# Patient Record
Sex: FEMALE | Race: WHITE | NOT HISPANIC OR LATINO | Employment: FULL TIME | URBAN - METROPOLITAN AREA
[De-identification: names, ages, dates, MRNs, and addresses within clinical notes are randomized per-mention and may not be internally consistent; named-entity substitution may affect disease eponyms.]

---

## 2022-07-24 LAB
CREAT ?TM UR-SCNC: 132 UMOL/L
EXT PROTEIN URINE: 28
PROT/CREAT UR: 212 MG/G{CREAT}

## 2022-08-31 LAB — HBA1C MFR BLD HPLC: 5.8 %

## 2022-12-01 ENCOUNTER — OFFICE VISIT (OUTPATIENT)
Dept: FAMILY MEDICINE CLINIC | Facility: CLINIC | Age: 67
End: 2022-12-01

## 2022-12-01 VITALS
OXYGEN SATURATION: 97 % | BODY MASS INDEX: 25.61 KG/M2 | TEMPERATURE: 96.9 F | HEIGHT: 64 IN | HEART RATE: 75 BPM | WEIGHT: 150 LBS | SYSTOLIC BLOOD PRESSURE: 130 MMHG | DIASTOLIC BLOOD PRESSURE: 80 MMHG

## 2022-12-01 DIAGNOSIS — M16.12 ARTHRITIS OF LEFT HIP: ICD-10-CM

## 2022-12-01 DIAGNOSIS — Z86.007: ICD-10-CM

## 2022-12-01 DIAGNOSIS — M25.542 ARTHRALGIA OF BOTH HANDS: ICD-10-CM

## 2022-12-01 DIAGNOSIS — E11.22 TYPE 2 DIABETES MELLITUS WITH STAGE 3A CHRONIC KIDNEY DISEASE, WITHOUT LONG-TERM CURRENT USE OF INSULIN (HCC): ICD-10-CM

## 2022-12-01 DIAGNOSIS — Z90.5 HX OF UNILATERAL NEPHRECTOMY: ICD-10-CM

## 2022-12-01 DIAGNOSIS — Q61.02 MULTIPLE RENAL CYSTS: ICD-10-CM

## 2022-12-01 DIAGNOSIS — E78.5 HYPERLIPIDEMIA, UNSPECIFIED HYPERLIPIDEMIA TYPE: ICD-10-CM

## 2022-12-01 DIAGNOSIS — Z11.59 NEED FOR HEPATITIS C SCREENING TEST: ICD-10-CM

## 2022-12-01 DIAGNOSIS — Q75.2 HYPERTELORISM: ICD-10-CM

## 2022-12-01 DIAGNOSIS — M25.541 ARTHRALGIA OF BOTH HANDS: ICD-10-CM

## 2022-12-01 DIAGNOSIS — N18.31 STAGE 3A CHRONIC KIDNEY DISEASE (HCC): ICD-10-CM

## 2022-12-01 DIAGNOSIS — F32.A DEPRESSION, UNSPECIFIED DEPRESSION TYPE: ICD-10-CM

## 2022-12-01 DIAGNOSIS — N18.31 TYPE 2 DIABETES MELLITUS WITH STAGE 3A CHRONIC KIDNEY DISEASE, WITHOUT LONG-TERM CURRENT USE OF INSULIN (HCC): ICD-10-CM

## 2022-12-01 DIAGNOSIS — E79.0 HYPERURICEMIA: ICD-10-CM

## 2022-12-01 DIAGNOSIS — Z72.0 TOBACCO USE: ICD-10-CM

## 2022-12-01 DIAGNOSIS — E55.9 VITAMIN D DEFICIENCY: ICD-10-CM

## 2022-12-01 DIAGNOSIS — Z13.820 SCREENING FOR OSTEOPOROSIS: ICD-10-CM

## 2022-12-01 DIAGNOSIS — Z00.00 HEALTH MAINTENANCE EXAMINATION: ICD-10-CM

## 2022-12-01 DIAGNOSIS — Z12.31 BREAST CANCER SCREENING BY MAMMOGRAM: Primary | ICD-10-CM

## 2022-12-01 DIAGNOSIS — N70.11 HYDROSALPINX: ICD-10-CM

## 2022-12-01 PROBLEM — M19.90 ARTHRITIS: Status: ACTIVE | Noted: 2022-12-01

## 2022-12-01 RX ORDER — ATENOLOL 50 MG/1
100 TABLET ORAL DAILY
Qty: 30 TABLET | Refills: 5 | Status: SHIPPED | OUTPATIENT
Start: 2022-12-01

## 2022-12-01 RX ORDER — ASPIRIN 81 MG/1
81 TABLET, CHEWABLE ORAL DAILY
COMMUNITY

## 2022-12-01 RX ORDER — ALLOPURINOL 300 MG/1
300 TABLET ORAL DAILY
Qty: 30 TABLET | Refills: 5 | Status: SHIPPED | OUTPATIENT
Start: 2022-12-01

## 2022-12-01 RX ORDER — ATORVASTATIN CALCIUM 10 MG/1
20 TABLET, FILM COATED ORAL DAILY
Qty: 30 TABLET | Refills: 5 | Status: SHIPPED | OUTPATIENT
Start: 2022-12-01

## 2022-12-01 RX ORDER — LIRAGLUTIDE 6 MG/ML
0.6 INJECTION SUBCUTANEOUS DAILY
Qty: 6 ML | Refills: 5 | Status: SHIPPED | OUTPATIENT
Start: 2022-12-01

## 2022-12-01 RX ORDER — ATENOLOL 50 MG/1
100 TABLET ORAL DAILY
COMMUNITY
End: 2022-12-01 | Stop reason: SDUPTHER

## 2022-12-01 RX ORDER — VITAMIN B COMPLEX
TABLET ORAL
COMMUNITY

## 2022-12-01 RX ORDER — LISINOPRIL 2.5 MG/1
2.5 TABLET ORAL DAILY
COMMUNITY
End: 2022-12-01 | Stop reason: SDUPTHER

## 2022-12-01 RX ORDER — LISINOPRIL 2.5 MG/1
2.5 TABLET ORAL DAILY
Qty: 30 TABLET | Refills: 5 | Status: SHIPPED | OUTPATIENT
Start: 2022-12-01

## 2022-12-01 RX ORDER — LIRAGLUTIDE 6 MG/ML
0.6 INJECTION SUBCUTANEOUS
COMMUNITY
End: 2022-12-01 | Stop reason: SDUPTHER

## 2022-12-01 RX ORDER — DULOXETIN HYDROCHLORIDE 60 MG/1
60 CAPSULE, DELAYED RELEASE ORAL
COMMUNITY
End: 2022-12-01 | Stop reason: SDUPTHER

## 2022-12-01 RX ORDER — ALLOPURINOL 200 MG/1
150 TABLET ORAL DAILY
COMMUNITY
End: 2022-12-01

## 2022-12-01 RX ORDER — ATORVASTATIN CALCIUM 10 MG/1
20 TABLET, FILM COATED ORAL DAILY
COMMUNITY
End: 2022-12-01 | Stop reason: SDUPTHER

## 2022-12-01 RX ORDER — DULOXETIN HYDROCHLORIDE 60 MG/1
60 CAPSULE, DELAYED RELEASE ORAL
Qty: 30 CAPSULE | Refills: 5 | Status: SHIPPED | OUTPATIENT
Start: 2022-12-01

## 2022-12-01 RX ORDER — CHLORAL HYDRATE 500 MG
CAPSULE ORAL
COMMUNITY

## 2022-12-01 NOTE — PROGRESS NOTES
Name: Belen Leon      : 1955      MRN: [id number]  Encounter Provider: Gustavo Wadsworth MD  Encounter Date: 2022   Encounter department: [facility]   4 month follow uo with fbmalik franks  1  Breast cancer screening by mammogram  -     Mammo screening bilateral w 3d & cad; Future; Expected date: 2022    2  Screening for osteoporosis  -     DXA bone density spine hip and pelvis; Future; Expected date: 2022    3  Hx of unilateral nephrectomy  -     Ambulatory Referral to Nephrology; Future    4  Stage 3a chronic kidney disease ([facility] [address] )  -     Ambulatory Referral to Nephrology; Future    5  Multiple renal cysts  -     Ambulatory Referral to Nephrology; Future    6  Hypertelorism  -     atenolol (TENORMIN) 50 mg tablet; Take 2 tablets (100 mg total) by mouth in the morning  -     lisinopril (ZESTRIL) 2 5 mg tablet; Take 1 tablet (2 5 mg total) by mouth in the morning    7  Hyperlipidemia, unspecified hyperlipidemia type  -     atorvastatin (LIPITOR) 10 mg tablet; Take 2 tablets (20 mg total) by mouth in the morning  -     Comprehensive metabolic panel; Future  -     CBC and differential; Future  -     Lipid panel; Future    8  Hyperuricemia  -     allopurinol (ZYLOPRIM) 300 mg tablet; Take 1 tablet (300 mg total) by mouth daily  -     Uric acid; Future  -     Ambulatory Referral to Nephrology; Future    9  Type 2 diabetes mellitus with stage 3a chronic kidney disease, without long-term current use of insulin (HCC)  -     liraglutide (Victoza) injection; Inject 0 1 mL (0 6 mg total) under the skin daily  -     Hemoglobin A1C; Future  -     Microalbumin / creatinine urine ratio  -     UA (URINE) with reflex to Scope    10  Depression, unspecified depression type  -     DULoxetine (CYMBALTA) 60 mg delayed release capsule; Take 1 capsule (60 mg total) by mouth daily in the early morning    11  Tobacco use    12   Arthritis of left hip    13  Arthralgia of both hands    14  Vitamin D deficiency  -     Vitamin D 25 hydroxy; Future  -     Ambulatory Referral to Nephrology; Future    15  Need for hepatitis C screening test  -     Hepatitis C antibody; Future    16  Hydrosalpinx  -     Ambulatory Referral to Gynecology; Future    17  H/O carcinoma in situ of skin  -     Ambulatory Referral to Dermatology; Future    18  Health maintenance examination      BMI Counseling: Body mass index is 25 75 kg/m²  Follow-up plan was not completed due to elderly patient (71 years old) where weight reduction/weight gain would complicate underlying health condition such as: illness or physical disability  Depression Screening and Follow-up Plan: Patient was screened for depression during today's encounter  They screened negative with a PHQ-2 score of 0  Subjective      This is a new patient to our practice  She recently moved back to this area from [place]  She has a history of nephrectomy for renal cell cancer this was several years ago and she has had no recurrence she does have stage 3 chronic kidney disease in the remaining kidney which also has multiple cysts  She follows with Nephrology for this  She also follows with gynecology for recurrent hydrosalpinx after having salpingectomy several years ago  She has history of skin cancer and follows with Dermatology once per year  She is diabetic, hypertensive and has high cholesterol  All these have been under good control  She has arthritis mostly of her wrist and thumbs but also end-stage arthritis of her left hip  Review of Systems   Constitutional: Negative  HENT: Negative  Respiratory: Negative  Cardiovascular: Negative  Gastrointestinal: Negative  Endocrine: Negative  Genitourinary: Negative  Musculoskeletal: Positive for arthralgias and gait problem  Skin: Negative  Hematological: Negative  Psychiatric/Behavioral: Negative          Current Outpatient Medications on File Prior to Visit   Medication Sig   • aspirin 81 mg chewable tablet Chew 81 mg in the morning   • cholecalciferol (VITAMIN D3) 25 mcg (1,000 units) tablet    • Omega-3 Fatty Acids (fish oil) 1,000 mg    • [DISCONTINUED] Allopurinol 200 MG TABS Take 150 mg by mouth in the morning   • [DISCONTINUED] atenolol (TENORMIN) 50 mg tablet Take 100 mg by mouth in the morning   • [DISCONTINUED] atorvastatin (LIPITOR) 10 mg tablet Take 20 mg by mouth in the morning   • [DISCONTINUED] DULoxetine (CYMBALTA) 60 mg delayed release capsule Take 60 mg by mouth daily in the early morning   • [DISCONTINUED] liraglutide (Victoza) injection 0 6 mg   • [DISCONTINUED] lisinopril (ZESTRIL) 2 5 mg tablet Take 2 5 mg by mouth in the morning       Objective     /80 (BP Location: Left arm, Patient Position: Sitting, Cuff Size: Adult)   Pulse 75   Temp (!) 96 9 °F (36 1 °C)   Ht 5' 4" (1 626 m)   Wt 68 kg (150 lb)   SpO2 97%   BMI 25 75 kg/m²     Physical Exam  Constitutional:       Appearance: Normal appearance  She is well-developed  HENT:      Head: Normocephalic and atraumatic  Right Ear: Tympanic membrane, ear canal and external ear normal       Left Ear: Tympanic membrane, ear canal and external ear normal       Nose: Nose normal       Mouth/Throat:      Mouth: Oropharynx is clear and moist  Mucous membranes are moist    Eyes:      Extraocular Movements: EOM normal       Pupils: Pupils are equal, round, and reactive to light  Neck:      Thyroid: No thyromegaly  Cardiovascular:      Rate and Rhythm: Normal rate and regular rhythm  Pulses: no weak pulses          Dorsalis pedis pulses are 1+ on the right side and 1+ on the left side  Posterior tibial pulses are 1+ on the left side  Heart sounds: Normal heart sounds  Pulmonary:      Effort: Pulmonary effort is normal       Breath sounds: Normal breath sounds     Abdominal:      General: Bowel sounds are normal       Palpations: Abdomen is soft  There is mass  Tenderness: There is no abdominal tenderness  Musculoskeletal:         General: Normal range of motion  Cervical back: Neck supple  Feet:      Right foot:      Skin integrity: No ulcer, skin breakdown, erythema, warmth, callus or dry skin  Left foot:      Skin integrity: No ulcer, skin breakdown, erythema, warmth, callus or dry skin  Lymphadenopathy:      Cervical: No cervical adenopathy  Skin:     General: Skin is warm and dry  Neurological:      Mental Status: She is alert  Psychiatric:         Mood and Affect: Mood and affect and mood normal          Behavior: Behavior normal      Diabetic Foot Exam    Patient's shoes and socks removed  Right Foot/Ankle   Right Foot Inspection  Skin Exam: skin normal and skin intact  No dry skin, no warmth, no callus, no erythema, no maceration, no abnormal color, no pre-ulcer, no ulcer and no callus  Toe Exam: ROM and strength within normal limits  Sensory   Vibration: intact  Proprioception: intact      Vascular  The right DP pulse is 1+  Left Foot/Ankle  Left Foot Inspection  Skin Exam: skin normal and skin intact  No dry skin, no warmth, no erythema, no maceration, normal color, no pre-ulcer, no ulcer and no callus  Toe Exam: ROM and strength within normal limits  Sensory   Vibration: intact  Proprioception: intact  Monofilament testing: intact    Vascular  The left DP pulse is 1+  The left PT pulse is 1+       Assign Risk Category  No deformity present  No loss of protective sensation  No weak pulses  Risk: 0    Luke Suero MD

## 2022-12-08 ENCOUNTER — TELEPHONE (OUTPATIENT)
Dept: FAMILY MEDICINE CLINIC | Facility: CLINIC | Age: 67
End: 2022-12-08

## 2022-12-08 DIAGNOSIS — M25.532 PAIN IN BOTH WRISTS: Primary | ICD-10-CM

## 2022-12-08 DIAGNOSIS — M25.531 PAIN IN BOTH WRISTS: Primary | ICD-10-CM

## 2022-12-09 ENCOUNTER — APPOINTMENT (OUTPATIENT)
Dept: LAB | Facility: CLINIC | Age: 67
End: 2022-12-09

## 2022-12-09 DIAGNOSIS — E55.9 VITAMIN D DEFICIENCY: ICD-10-CM

## 2022-12-09 DIAGNOSIS — N18.31 TYPE 2 DIABETES MELLITUS WITH STAGE 3A CHRONIC KIDNEY DISEASE, WITHOUT LONG-TERM CURRENT USE OF INSULIN (HCC): ICD-10-CM

## 2022-12-09 DIAGNOSIS — E78.5 HYPERLIPIDEMIA, UNSPECIFIED HYPERLIPIDEMIA TYPE: ICD-10-CM

## 2022-12-09 DIAGNOSIS — Z11.59 NEED FOR HEPATITIS C SCREENING TEST: ICD-10-CM

## 2022-12-09 DIAGNOSIS — E79.0 HYPERURICEMIA: ICD-10-CM

## 2022-12-09 DIAGNOSIS — E11.22 TYPE 2 DIABETES MELLITUS WITH STAGE 3A CHRONIC KIDNEY DISEASE, WITHOUT LONG-TERM CURRENT USE OF INSULIN (HCC): ICD-10-CM

## 2022-12-09 LAB
25(OH)D3 SERPL-MCNC: 28.8 NG/ML (ref 30–100)
ALBUMIN SERPL BCP-MCNC: 3.3 G/DL (ref 3.5–5)
ALP SERPL-CCNC: 88 U/L (ref 46–116)
ALT SERPL W P-5'-P-CCNC: 17 U/L (ref 12–78)
ANION GAP SERPL CALCULATED.3IONS-SCNC: 2 MMOL/L (ref 4–13)
AST SERPL W P-5'-P-CCNC: 12 U/L (ref 5–45)
BASOPHILS # BLD AUTO: 0.05 THOUSANDS/ÂΜL (ref 0–0.1)
BASOPHILS NFR BLD AUTO: 1 % (ref 0–1)
BILIRUB SERPL-MCNC: 0.47 MG/DL (ref 0.2–1)
BILIRUB UR QL STRIP: NEGATIVE
BUN SERPL-MCNC: 22 MG/DL (ref 5–25)
CALCIUM ALBUM COR SERPL-MCNC: 10.9 MG/DL (ref 8.3–10.1)
CALCIUM SERPL-MCNC: 10.3 MG/DL (ref 8.3–10.1)
CHLORIDE SERPL-SCNC: 105 MMOL/L (ref 96–108)
CHOLEST SERPL-MCNC: 172 MG/DL
CLARITY UR: CLEAR
CO2 SERPL-SCNC: 29 MMOL/L (ref 21–32)
COLOR UR: NORMAL
CREAT SERPL-MCNC: 1.07 MG/DL (ref 0.6–1.3)
EOSINOPHIL # BLD AUTO: 0.27 THOUSAND/ÂΜL (ref 0–0.61)
EOSINOPHIL NFR BLD AUTO: 3 % (ref 0–6)
ERYTHROCYTE [DISTWIDTH] IN BLOOD BY AUTOMATED COUNT: 15.7 % (ref 11.6–15.1)
GFR SERPL CREATININE-BSD FRML MDRD: 53 ML/MIN/1.73SQ M
GLUCOSE P FAST SERPL-MCNC: 99 MG/DL (ref 65–99)
GLUCOSE UR STRIP-MCNC: NEGATIVE MG/DL
HCT VFR BLD AUTO: 40.7 % (ref 34.8–46.1)
HDLC SERPL-MCNC: 55 MG/DL
HGB BLD-MCNC: 12.5 G/DL (ref 11.5–15.4)
HGB UR QL STRIP.AUTO: NEGATIVE
IMM GRANULOCYTES # BLD AUTO: 0.03 THOUSAND/UL (ref 0–0.2)
IMM GRANULOCYTES NFR BLD AUTO: 0 % (ref 0–2)
KETONES UR STRIP-MCNC: NEGATIVE MG/DL
LDLC SERPL CALC-MCNC: 98 MG/DL (ref 0–100)
LEUKOCYTE ESTERASE UR QL STRIP: NEGATIVE
LYMPHOCYTES # BLD AUTO: 2.23 THOUSANDS/ÂΜL (ref 0.6–4.47)
LYMPHOCYTES NFR BLD AUTO: 25 % (ref 14–44)
MCH RBC QN AUTO: 28.9 PG (ref 26.8–34.3)
MCHC RBC AUTO-ENTMCNC: 30.7 G/DL (ref 31.4–37.4)
MCV RBC AUTO: 94 FL (ref 82–98)
MONOCYTES # BLD AUTO: 0.69 THOUSAND/ÂΜL (ref 0.17–1.22)
MONOCYTES NFR BLD AUTO: 8 % (ref 4–12)
NEUTROPHILS # BLD AUTO: 5.75 THOUSANDS/ÂΜL (ref 1.85–7.62)
NEUTS SEG NFR BLD AUTO: 63 % (ref 43–75)
NITRITE UR QL STRIP: NEGATIVE
NONHDLC SERPL-MCNC: 117 MG/DL
NRBC BLD AUTO-RTO: 0 /100 WBCS
PH UR STRIP.AUTO: 6.5 [PH]
PLATELET # BLD AUTO: 351 THOUSANDS/UL (ref 149–390)
PMV BLD AUTO: 12 FL (ref 8.9–12.7)
POTASSIUM SERPL-SCNC: 5.1 MMOL/L (ref 3.5–5.3)
PROT SERPL-MCNC: 6.8 G/DL (ref 6.4–8.4)
PROT UR STRIP-MCNC: NEGATIVE MG/DL
RBC # BLD AUTO: 4.32 MILLION/UL (ref 3.81–5.12)
SODIUM SERPL-SCNC: 136 MMOL/L (ref 135–147)
SP GR UR STRIP.AUTO: 1.02 (ref 1–1.03)
TRIGL SERPL-MCNC: 95 MG/DL
URATE SERPL-MCNC: 3.8 MG/DL (ref 2–7.5)
UROBILINOGEN UR STRIP-ACNC: <2 MG/DL
WBC # BLD AUTO: 9.02 THOUSAND/UL (ref 4.31–10.16)

## 2022-12-10 LAB
CREAT UR-MCNC: 116 MG/DL
EST. AVERAGE GLUCOSE BLD GHB EST-MCNC: 108 MG/DL
HBA1C MFR BLD: 5.4 %
HCV AB SER QL: NORMAL
MICROALBUMIN UR-MCNC: 16.7 MG/L (ref 0–20)
MICROALBUMIN/CREAT 24H UR: 14 MG/G CREATININE (ref 0–30)

## 2022-12-12 DIAGNOSIS — N18.31 TYPE 2 DIABETES MELLITUS WITH STAGE 3A CHRONIC KIDNEY DISEASE, WITHOUT LONG-TERM CURRENT USE OF INSULIN (HCC): Primary | ICD-10-CM

## 2022-12-12 DIAGNOSIS — E11.22 TYPE 2 DIABETES MELLITUS WITH STAGE 3A CHRONIC KIDNEY DISEASE, WITHOUT LONG-TERM CURRENT USE OF INSULIN (HCC): Primary | ICD-10-CM

## 2022-12-12 DIAGNOSIS — E55.9 VITAMIN D DEFICIENCY: ICD-10-CM

## 2022-12-12 DIAGNOSIS — E78.5 HYPERLIPIDEMIA, UNSPECIFIED HYPERLIPIDEMIA TYPE: ICD-10-CM

## 2022-12-13 ENCOUNTER — TELEPHONE (OUTPATIENT)
Dept: FAMILY MEDICINE CLINIC | Facility: CLINIC | Age: 67
End: 2022-12-13

## 2023-01-10 LAB
LEFT EYE DIABETIC RETINOPATHY: NORMAL
RIGHT EYE DIABETIC RETINOPATHY: NORMAL

## 2023-01-16 ENCOUNTER — TELEPHONE (OUTPATIENT)
Dept: NEPHROLOGY | Facility: CLINIC | Age: 68
End: 2023-01-16

## 2023-01-17 ENCOUNTER — CONSULT (OUTPATIENT)
Dept: NEPHROLOGY | Facility: CLINIC | Age: 68
End: 2023-01-17

## 2023-01-17 VITALS
TEMPERATURE: 96.6 F | OXYGEN SATURATION: 95 % | SYSTOLIC BLOOD PRESSURE: 140 MMHG | BODY MASS INDEX: 26.12 KG/M2 | WEIGHT: 153 LBS | HEART RATE: 78 BPM | RESPIRATION RATE: 16 BRPM | DIASTOLIC BLOOD PRESSURE: 84 MMHG | HEIGHT: 64 IN

## 2023-01-17 DIAGNOSIS — E79.0 HYPERURICEMIA: ICD-10-CM

## 2023-01-17 DIAGNOSIS — Q61.02 MULTIPLE RENAL CYSTS: ICD-10-CM

## 2023-01-17 DIAGNOSIS — E55.9 VITAMIN D DEFICIENCY: ICD-10-CM

## 2023-01-17 DIAGNOSIS — N18.31 STAGE 3A CHRONIC KIDNEY DISEASE (HCC): ICD-10-CM

## 2023-01-17 DIAGNOSIS — Z90.5 HX OF UNILATERAL NEPHRECTOMY: ICD-10-CM

## 2023-01-17 NOTE — LETTER
January 17, 2023     Rolf Mcclendon 1961    Patient: Nieves Cai   YOB: 1955   Date of Visit: 1/17/2023       Dear Dr Patel Due: Thank you for referring Loreatha Schwab to me for evaluation  Below are my notes for this consultation  If you have questions, please do not hesitate to call me  I look forward to following your patient along with you  Sincerely,        Deja Fink MD        CC: No Recipients  Deja Fink MD  1/17/2023  9:50 AM  Incomplete  NEPHROLOGY OFFICE CONSULT  Nieves Cai 75 y o  female MRN: [id number]    Encounter: [id number] DATE: 1/17/2023    REASON FOR VISIT: Nieves Cai is a 75 y  o female who was referred by Daina Persaud MD for evaluation  Poonam Daniel HPI:    This is a 75years old female with past medical history of hypertension, diabetes mellitus type 2, chronic kidney disease stage IIIa, renal cell carcinoma status post right nephrectomy, dyslipidemia, insomnia who was referred to renal clinic by her PCP due to having history of nephrectomy  Patient moved to [facility] from [address] [place] [address]  In the year 2016 she was diagnosed with right renal cell carcinoma and underwent a laparoscopic nephrectomy  Admits to having adequate control over her hypertension and diabetes mellitus  She checks her blood pressure and blood sugar frequently admits to adequate control  She denies use of NSAIDs  She does admit to having several simple exophytic cyst on her left kidney which have been followed over the past few years  In the year 2021, renal ultrasound had revealed a new indeterminate cyst on her left kidney which was later followed with the use of MRI and noted to be benign  Currently she is only complaining of waking up in the middle of night and unable to fall asleep  Denies any blood in the urine          PAST MEDICAL HISTORY:  Past Medical History:   Diagnosis Date   • Chronic kidney disease    • Diabetes type 2, controlled ([facility] [address] )    • Fallopian tube disorder     fills with fluid   • Hypertension    • Kidney cysts    • Multiple thyroid nodules    • Stage 3 chronic kidney disease ([facility] [address] )        PAST SURGICAL HISTORY:  Past Surgical History:   Procedure Laterality Date   • KIDNEY SURGERY Right     removal   • TUBAL LIGATION Right    • WRIST SURGERY Left     basal joint soft tissue interpositional arthroplast L thumb       SOCIAL HISTORY:  Social History     Substance and Sexual Activity   Alcohol Use Never     Social History     Substance and Sexual Activity   Drug Use Yes   • Types: Marijuana     Social History     Tobacco Use   Smoking Status Some Days   • Years: 15 00   • Types: Cigarettes   Smokeless Tobacco Never       FAMILY HISTORY:  Family History   Problem Relation Age of Onset   • Heart disease Mother    • Hypertension Mother    • Hypertension Father    • Lung cancer Brother    • Hypertension Brother    • Diabetes Maternal Grandfather        ALLERGY:  No Known Allergies    MEDICATIONS:    Current Outpatient Medications:   •  allopurinol (ZYLOPRIM) 300 mg tablet, Take 1 tablet (300 mg total) by mouth daily (Patient taking differently: Take 150 mg by mouth daily), Disp: 30 tablet, Rfl: 5  •  aspirin 81 mg chewable tablet, Chew 81 mg in the morning, Disp: , Rfl:   •  atenolol (TENORMIN) 50 mg tablet, Take 2 tablets (100 mg total) by mouth in the morning (Patient taking differently: Take 50 mg by mouth in the morning), Disp: 30 tablet, Rfl: 5  •  atorvastatin (LIPITOR) 10 mg tablet, Take 2 tablets (20 mg total) by mouth in the morning (Patient taking differently: Take 10 mg by mouth in the morning), Disp: 30 tablet, Rfl: 5  •  Cetirizine-Pseudoephedrine (ZYRTEC-D ALLERGY & CONGESTION PO), as needed, Disp: , Rfl:   •  cholecalciferol (VITAMIN D3) 25 mcg (1,000 units) tablet, , Disp: , Rfl:   •  DULoxetine (CYMBALTA) 60 mg delayed release capsule, Take 1 capsule (60 mg total) by mouth daily in the early morning, Disp: 30 capsule, Rfl: 5  •  liraglutide (Victoza) injection, Inject 0 1 mL (0 6 mg total) under the skin daily, Disp: 6 mL, Rfl: 5  •  lisinopril (ZESTRIL) 2 5 mg tablet, Take 1 tablet (2 5 mg total) by mouth in the morning, Disp: 30 tablet, Rfl: 5  •  Omega-3 Fatty Acids (fish oil) 1,000 mg, , Disp: , Rfl:     REVIEW OF SYSTEMS:    Review of Systems   Constitutional: Negative  HENT: Negative  Eyes: Negative  Respiratory: Negative  Cardiovascular: Negative  Gastrointestinal: Negative  Endocrine: Negative  Genitourinary: Negative  Musculoskeletal: Negative  Skin: Negative  Allergic/Immunologic: Negative  Neurological: Negative  Hematological: Negative  All other systems reviewed and are negative  PHYSICAL EXAM:  Vitals:    01/17/23 0858   BP: 140/84   BP Location: Left arm   Patient Position: Sitting   Cuff Size: Standard   Pulse: 78   Resp: 16   Temp: (!) 96 6 °F (35 9 °C)   TempSrc: Temporal   SpO2: 95%   Weight: 69 4 kg (153 lb)   Height: 5' 3 5" (1 613 m)     Body mass index is 26 68 kg/m²  Physical Exam  Constitutional:       Appearance: She is well-developed  HENT:      Head: Normocephalic and atraumatic  Eyes:      Pupils: Pupils are equal, round, and reactive to light  Cardiovascular:      Rate and Rhythm: Normal rate and regular rhythm  Heart sounds: Normal heart sounds  Pulmonary:      Effort: Pulmonary effort is normal    Abdominal:      General: Bowel sounds are normal       Palpations: Abdomen is soft  Musculoskeletal:         General: Normal range of motion  Cervical back: Neck supple  Skin:     General: Skin is warm  Neurological:      Mental Status: She is alert and oriented to person, place, and time  LAB RESULTS:  Results for orders placed or performed in visit on 12/09/22   Hemoglobin A1C   Result Value Ref Range    Hemoglobin A1C 5 4 Normal 3 8-5 6%; PreDiabetic 5 7-6 4%;  Diabetic >=6 5%; Glycemic control for adults with diabetes <7 0% %     mg/dl   Vitamin D 25 hydroxy   Result Value Ref Range    Vit D, 25-Hydroxy 28 8 (L) 30 0 - 100 0 ng/mL   Uric acid   Result Value Ref Range    Uric Acid 3 8 2 0 - 7 5 mg/dL   Comprehensive metabolic panel   Result Value Ref Range    Sodium 136 135 - 147 mmol/L    Potassium 5 1 3 5 - 5 3 mmol/L    Chloride 105 96 - 108 mmol/L    CO2 29 21 - 32 mmol/L    ANION GAP 2 (L) 4 - 13 mmol/L    BUN 22 5 - 25 mg/dL    Creatinine 1 07 0 60 - 1 30 mg/dL    Glucose, Fasting 99 65 - 99 mg/dL    Calcium 10 3 (H) 8 3 - 10 1 mg/dL    Corrected Calcium 10 9 (H) 8 3 - 10 1 mg/dL    AST 12 5 - 45 U/L    ALT 17 12 - 78 U/L    Alkaline Phosphatase 88 46 - 116 U/L    Total Protein 6 8 6 4 - 8 4 g/dL    Albumin 3 3 (L) 3 5 - 5 0 g/dL    Total Bilirubin 0 47 0 20 - 1 00 mg/dL    eGFR 53 ml/min/1 73sq m   CBC and differential   Result Value Ref Range    WBC 9 02 4 31 - 10 16 Thousand/uL    RBC 4 32 3 81 - 5 12 Million/uL    Hemoglobin 12 5 11 5 - 15 4 g/dL    Hematocrit 40 7 34 8 - 46 1 %    MCV 94 82 - 98 fL    MCH 28 9 26 8 - 34 3 pg    MCHC 30 7 (L) 31 4 - 37 4 g/dL    RDW 15 7 (H) 11 6 - 15 1 %    MPV 12 0 8 9 - 12 7 fL    Platelets 890 610 - 117 Thousands/uL    nRBC 0 /100 WBCs    Neutrophils Relative 63 43 - 75 %    Immat GRANS % 0 0 - 2 %    Lymphocytes Relative 25 14 - 44 %    Monocytes Relative 8 4 - 12 %    Eosinophils Relative 3 0 - 6 %    Basophils Relative 1 0 - 1 %    Neutrophils Absolute 5 75 1 85 - 7 62 Thousands/µL    Immature Grans Absolute 0 03 0 00 - 0 20 Thousand/uL    Lymphocytes Absolute 2 23 0 60 - 4 47 Thousands/µL    Monocytes Absolute 0 69 0 17 - 1 22 Thousand/µL    Eosinophils Absolute 0 27 0 00 - 0 61 Thousand/µL    Basophils Absolute 0 05 0 00 - 0 10 Thousands/µL   Lipid panel   Result Value Ref Range    Cholesterol 172 See Comment mg/dL    Triglycerides 95 See Comment mg/dL    HDL, Direct 55 >=50 mg/dL    LDL Calculated 98 0 - 100 mg/dL    Non-HDL-Chol (CHOL-HDL) 117 mg/dl   Hepatitis C antibody   Result Value Ref Range    Hepatitis C Ab Non-reactive Non-reactive         ASSESSMENT and PLAN:  Jalen Montero was seen today for chronic kidney disease and consult  Diagnoses and all orders for this visit:    Hx of unilateral nephrectomy  -     Ambulatory Referral to Nephrology    Stage 3a chronic kidney disease [facility])  -     Ambulatory Referral to Nephrology  -     US kidney and bladder; Future  -     Albumin; Standing  -     Calcium; Standing  -     CBC and differential; Standing  -     Comprehensive metabolic panel; Standing  -     Phosphorus; Standing  -     Protein / creatinine ratio, urine; Standing  -     PTH, intact; Standing  -     Urinalysis with microscopic; Standing  -     Vitamin D 25 hydroxy; Standing  -     Immunoglobulin free LT chains blood; Future  -     Protein electrophoresis, serum; Future  -     Albumin  -     Calcium  -     CBC and differential  -     Comprehensive metabolic panel  -     Phosphorus  -     Protein / creatinine ratio, urine  -     PTH, intact  -     Urinalysis with microscopic  -     Vitamin D 25 hydroxy    Multiple renal cysts  -     Ambulatory Referral to Nephrology    Hyperuricemia  -     Ambulatory Referral to Nephrology    Vitamin D deficiency  -     Ambulatory Referral to Nephrology      75years old female with past medical history of hypertension, diabetes mellitus type 2, renal cell carcinoma status post right nephrectomy, chronic disease stage IIIa, dyslipidemia who presents to renal clinic for evaluation and management of CKD  1   Chronic kidney disease stage IIIa: Etiology likely solitary kidney after undergoing nephrectomy for renal cell carcinoma plus 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  -Patient have several risk factors however there are relatively well-controlled and remains at low risk of progression of CKD  -Avoidance of NSAIDs reiterated   -Avoidance of contrast based CT reiterated as well    -Ingestion of 64 ounces of water recommended  -Recent labs revealing serum creatinine 1 0 with estimated GFR of 53 and stable  2   Hypertension due to CKD: Office blood pressure is above target at 140/84  -Target blood pressure is 130/80   -Recommend low-salt diet   -She is maintained on atenolol 50 mg daily  3   Diabetes mellitus type 2: Blood sugars well controlled on Victoza  4   Multiple cyst on kidney: With the remaining left kidney, noted to have several simple cyst   -We will obtain a baseline renal ultrasound and there are 2023 to evaluate the size of the cyst     5   Dyslipidemia: On statin  6   Proteinuria: Recent urinalysis revealing absence of proteinuria  7   Hypercalcemia: Noted serum calcium of 10 3 and mildly elevated  She is on vitamin D3 supplement  -Recommended increasing water intake up to 64 ounces to prevent worsening of hypercalcemia   -Will order paraproteinemia work-up  8   Hyperkalemia: Serum potassium of 5 1 and at the upper end of normal   Recommended low potassium diet  9   Nutrition: Low-salt, moderate potassium and protein diet recommended  Mary Coates

## 2023-01-17 NOTE — PROGRESS NOTES
NEPHROLOGY OFFICE CONSULT  Larry Tyson 75 y o  female MRN: [id number]    Encounter: [id number] DATE: 1/17/2023    REASON FOR VISIT: Larry Tyson is a 75 y  o female who was referred by Gunnar Amos MD for evaluation  Phoebe Lagunas HPI:    This is a 75years old female with past medical history of hypertension, diabetes mellitus type 2, chronic kidney disease stage IIIa, renal cell carcinoma status post right nephrectomy, dyslipidemia, insomnia who was referred to renal clinic by her PCP due to having history of nephrectomy  Patient moved to [facility] from [address] [place] [address]  In the year 2016 she was diagnosed with right renal cell carcinoma and underwent a laparoscopic nephrectomy  Admits to having adequate control over her hypertension and diabetes mellitus  She checks her blood pressure and blood sugar frequently admits to adequate control  She denies use of NSAIDs  She does admit to having several simple exophytic cyst on her left kidney which have been followed over the past few years  In the year 2021, renal ultrasound had revealed a new indeterminate cyst on her left kidney which was later followed with the use of MRI and noted to be benign  Currently she is only complaining of waking up in the middle of night and unable to fall asleep  Denies any blood in the urine          PAST MEDICAL HISTORY:  Past Medical History:   Diagnosis Date   • Chronic kidney disease    • Diabetes type 2, controlled ([address] )    • Fallopian tube disorder     fills with fluid   • Hypertension    • Kidney cysts    • Multiple thyroid nodules    • Stage 3 chronic kidney disease ([address] )        PAST SURGICAL HISTORY:  Past Surgical History:   Procedure Laterality Date   • KIDNEY SURGERY Right     removal   • TUBAL LIGATION Right    • WRIST SURGERY Left     basal joint soft tissue interpositional arthroplast L thumb       SOCIAL HISTORY:  Social History     Substance and Sexual Activity   Alcohol Use Never     Social History Substance and Sexual Activity   Drug Use Yes   • Types: Marijuana     Social History     Tobacco Use   Smoking Status Some Days   • Years: 15 00   • Types: Cigarettes   Smokeless Tobacco Never       FAMILY HISTORY:  Family History   Problem Relation Age of Onset   • Heart disease Mother    • Hypertension Mother    • Hypertension Father    • Lung cancer Brother    • Hypertension Brother    • Diabetes Maternal Grandfather        ALLERGY:  No Known Allergies    MEDICATIONS:    Current Outpatient Medications:   •  allopurinol (ZYLOPRIM) 300 mg tablet, Take 1 tablet (300 mg total) by mouth daily (Patient taking differently: Take 150 mg by mouth daily), Disp: 30 tablet, Rfl: 5  •  aspirin 81 mg chewable tablet, Chew 81 mg in the morning, Disp: , Rfl:   •  atenolol (TENORMIN) 50 mg tablet, Take 2 tablets (100 mg total) by mouth in the morning (Patient taking differently: Take 50 mg by mouth in the morning), Disp: 30 tablet, Rfl: 5  •  atorvastatin (LIPITOR) 10 mg tablet, Take 2 tablets (20 mg total) by mouth in the morning (Patient taking differently: Take 10 mg by mouth in the morning), Disp: 30 tablet, Rfl: 5  •  Cetirizine-Pseudoephedrine (ZYRTEC-D ALLERGY & CONGESTION PO), as needed, Disp: , Rfl:   •  cholecalciferol (VITAMIN D3) 25 mcg (1,000 units) tablet, , Disp: , Rfl:   •  DULoxetine (CYMBALTA) 60 mg delayed release capsule, Take 1 capsule (60 mg total) by mouth daily in the early morning, Disp: 30 capsule, Rfl: 5  •  liraglutide (Victoza) injection, Inject 0 1 mL (0 6 mg total) under the skin daily, Disp: 6 mL, Rfl: 5  •  lisinopril (ZESTRIL) 2 5 mg tablet, Take 1 tablet (2 5 mg total) by mouth in the morning, Disp: 30 tablet, Rfl: 5  •  Omega-3 Fatty Acids (fish oil) 1,000 mg, , Disp: , Rfl:     REVIEW OF SYSTEMS:    Review of Systems   Constitutional: Negative  HENT: Negative  Eyes: Negative  Respiratory: Negative  Cardiovascular: Negative  Gastrointestinal: Negative  Endocrine: Negative  Genitourinary: Negative  Musculoskeletal: Negative  Skin: Negative  Allergic/Immunologic: Negative  Neurological: Negative  Hematological: Negative  All other systems reviewed and are negative  PHYSICAL EXAM:  Vitals:    01/17/23 0858   BP: 140/84   BP Location: Left arm   Patient Position: Sitting   Cuff Size: Standard   Pulse: 78   Resp: 16   Temp: (!) 96 6 °F (35 9 °C)   TempSrc: Temporal   SpO2: 95%   Weight: 69 4 kg (153 lb)   Height: 5' 3 5" (1 613 m)     Body mass index is 26 68 kg/m²  Physical Exam  Constitutional:       Appearance: She is well-developed  HENT:      Head: Normocephalic and atraumatic  Eyes:      Pupils: Pupils are equal, round, and reactive to light  Cardiovascular:      Rate and Rhythm: Normal rate and regular rhythm  Heart sounds: Normal heart sounds  Pulmonary:      Effort: Pulmonary effort is normal    Abdominal:      General: Bowel sounds are normal       Palpations: Abdomen is soft  Musculoskeletal:         General: Normal range of motion  Cervical back: Neck supple  Skin:     General: Skin is warm  Neurological:      Mental Status: She is alert and oriented to person, place, and time  LAB RESULTS:  Results for orders placed or performed in visit on 12/09/22   Hemoglobin A1C   Result Value Ref Range    Hemoglobin A1C 5 4 Normal 3 8-5 6%; PreDiabetic 5 7-6 4%;  Diabetic >=6 5%; Glycemic control for adults with diabetes <7 0% %     mg/dl   Vitamin D 25 hydroxy   Result Value Ref Range    Vit D, 25-Hydroxy 28 8 (L) 30 0 - 100 0 ng/mL   Uric acid   Result Value Ref Range    Uric Acid 3 8 2 0 - 7 5 mg/dL   Comprehensive metabolic panel   Result Value Ref Range    Sodium 136 135 - 147 mmol/L    Potassium 5 1 3 5 - 5 3 mmol/L    Chloride 105 96 - 108 mmol/L    CO2 29 21 - 32 mmol/L    ANION GAP 2 (L) 4 - 13 mmol/L    BUN 22 5 - 25 mg/dL    Creatinine 1 07 0 60 - 1 30 mg/dL    Glucose, Fasting 99 65 - 99 mg/dL    Calcium 10 3 (H) 8 3 - 10 1 mg/dL    Corrected Calcium 10 9 (H) 8 3 - 10 1 mg/dL    AST 12 5 - 45 U/L    ALT 17 12 - 78 U/L    Alkaline Phosphatase 88 46 - 116 U/L    Total Protein 6 8 6 4 - 8 4 g/dL    Albumin 3 3 (L) 3 5 - 5 0 g/dL    Total Bilirubin 0 47 0 20 - 1 00 mg/dL    eGFR 53 ml/min/1 73sq m   CBC and differential   Result Value Ref Range    WBC 9 02 4 31 - 10 16 Thousand/uL    RBC 4 32 3 81 - 5 12 Million/uL    Hemoglobin 12 5 11 5 - 15 4 g/dL    Hematocrit 40 7 34 8 - 46 1 %    MCV 94 82 - 98 fL    MCH 28 9 26 8 - 34 3 pg    MCHC 30 7 (L) 31 4 - 37 4 g/dL    RDW 15 7 (H) 11 6 - 15 1 %    MPV 12 0 8 9 - 12 7 fL    Platelets 303 834 - 066 Thousands/uL    nRBC 0 /100 WBCs    Neutrophils Relative 63 43 - 75 %    Immat GRANS % 0 0 - 2 %    Lymphocytes Relative 25 14 - 44 %    Monocytes Relative 8 4 - 12 %    Eosinophils Relative 3 0 - 6 %    Basophils Relative 1 0 - 1 %    Neutrophils Absolute 5 75 1 85 - 7 62 Thousands/µL    Immature Grans Absolute 0 03 0 00 - 0 20 Thousand/uL    Lymphocytes Absolute 2 23 0 60 - 4 47 Thousands/µL    Monocytes Absolute 0 69 0 17 - 1 22 Thousand/µL    Eosinophils Absolute 0 27 0 00 - 0 61 Thousand/µL    Basophils Absolute 0 05 0 00 - 0 10 Thousands/µL   Lipid panel   Result Value Ref Range    Cholesterol 172 See Comment mg/dL    Triglycerides 95 See Comment mg/dL    HDL, Direct 55 >=50 mg/dL    LDL Calculated 98 0 - 100 mg/dL    Non-HDL-Chol (CHOL-HDL) 117 mg/dl   Hepatitis C antibody   Result Value Ref Range    Hepatitis C Ab Non-reactive Non-reactive         ASSESSMENT and PLAN:  Sonal Clarke was seen today for chronic kidney disease and consult  Diagnoses and all orders for this visit:    Hx of unilateral nephrectomy  -     Ambulatory Referral to Nephrology    Stage 3a chronic kidney disease [facility])  -     Ambulatory Referral to Nephrology  -     US kidney and bladder;  Future  -     Albumin; Standing  -     Calcium; Standing  -     CBC and differential; Standing  -     Comprehensive metabolic panel; Standing  -     Phosphorus; Standing  -     Protein / creatinine ratio, urine; Standing  -     PTH, intact; Standing  -     Urinalysis with microscopic; Standing  -     Vitamin D 25 hydroxy; Standing  -     Immunoglobulin free LT chains blood; Future  -     Protein electrophoresis, serum; Future  -     Albumin  -     Calcium  -     CBC and differential  -     Comprehensive metabolic panel  -     Phosphorus  -     Protein / creatinine ratio, urine  -     PTH, intact  -     Urinalysis with microscopic  -     Vitamin D 25 hydroxy    Multiple renal cysts  -     Ambulatory Referral to Nephrology    Hyperuricemia  -     Ambulatory Referral to Nephrology    Vitamin D deficiency  -     Ambulatory Referral to Nephrology      75years old female with past medical history of hypertension, diabetes mellitus type 2, renal cell carcinoma status post right nephrectomy, chronic disease stage IIIa, dyslipidemia who presents to renal clinic for evaluation and management of CKD  1   Chronic kidney disease stage IIIa: Etiology likely solitary kidney after undergoing nephrectomy for renal cell carcinoma plus 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  -Patient have several risk factors however there are relatively well-controlled and remains at low risk of progression of CKD  -Avoidance of NSAIDs reiterated   -Avoidance of contrast based CT reiterated as well  -Ingestion of 64 ounces of water recommended  -Recent labs revealing serum creatinine 1 0 with estimated GFR of 53 and stable  2   Hypertension due to CKD: Office blood pressure is above target at 140/84  -Target blood pressure is 130/80   -Recommend low-salt diet   -She is maintained on atenolol 50 mg daily  3   Diabetes mellitus type 2: Blood sugars well controlled on Victoza      4   Multiple cyst on kidney: With the remaining left kidney, noted to have several simple cyst   -We will obtain a baseline renal ultrasound and there are 2023 to evaluate the size of the cyst     5   Dyslipidemia: On statin  6   Proteinuria: Recent urinalysis revealing absence of proteinuria  7   Hypercalcemia: Noted serum calcium of 10 3 and mildly elevated  She is on vitamin D3 supplement  -Recommended increasing water intake up to 64 ounces to prevent worsening of hypercalcemia   -Will order paraproteinemia work-up  8   Hyperkalemia: Serum potassium of 5 1 and at the upper end of normal   Recommended low potassium diet  9   Nutrition: Low-salt, moderate potassium and protein diet recommended  Ana Yanes

## 2023-01-18 ENCOUNTER — TELEPHONE (OUTPATIENT)
Dept: ADMINISTRATIVE | Facility: OTHER | Age: 68
End: 2023-01-18

## 2023-01-18 NOTE — TELEPHONE ENCOUNTER
Upon review of the In Basket request and the patient's chart, initial outreach has been made via fax to facility  Please see Contacts section for details       Thank you  Sandi Denny

## 2023-01-18 NOTE — LETTER
Diabetic Eye Exam Form    Date Requested: 23  Patient: Kimberly Mathew  Patient : [id number]   Referring Provider: Lizz Roberts MD      DIABETIC Eye Exam Date _______________________________      Type of Exam MUST be documented for Diabetic Eye Exams  Please CHECK ONE  Retinal Exam       Dilated Retinal Exam       OCT       Optomap-Iris Exam      Fundus Photography       Left Eye - Please check Retinopathy or No Retinopathy        Exam did show retinopathy    Exam did not show retinopathy       Right Eye - Please check Retinopathy or No Retinopathy       Exam did show retinopathy    Exam did not show retinopathy       Comments __________________________________________________________    Practice Providing Exam ______________________________________________    Exam Performed By (print name) _______________________________________      Provider Signature ___________________________________________________      These reports are needed for  compliance  Please fax this completed form and a copy of the Diabetic Eye Exam report to our office located at [place] [address] as soon as possible via [phone] [facility]: Phone [phone]  We thank you for your assistance in treating our mutual patient

## 2023-01-18 NOTE — TELEPHONE ENCOUNTER
----- Message from Brendon Puente sent at 1/17/2023  2:42 PM EST -----  Regarding: Care gap request  01/17/23 2:42 PM    Hello, our patient Doni Love has had Diabetic Eye Exam completed/performed  Please assist in updating the patient chart by making an External outreach to [facility] eye specialist facility located in [address] [place] [address] in [place], [address] [place] [address]  The date of service is 1-2 weeks ago      Thank you,  Brendon Foreman [phone]

## 2023-01-24 NOTE — TELEPHONE ENCOUNTER
As a follow-up, a second attempt has been made for outreach via fax to facility  Please see Contacts section for details      Thank you  Annia June

## 2023-01-27 NOTE — TELEPHONE ENCOUNTER
Upon review of the In Basket request we were able to locate, review, and update the patient chart as requested for Diabetic Eye Exam     Any additional questions or concerns should be emailed to the Practice Liaisons via the appropriate education email address, please do not reply via In Basket      Thank you  Laurel Grimm

## 2023-01-30 PROBLEM — Z11.59 NEED FOR HEPATITIS C SCREENING TEST: Status: RESOLVED | Noted: 2022-12-01 | Resolved: 2023-01-30

## 2023-02-14 ENCOUNTER — OFFICE VISIT (OUTPATIENT)
Dept: OBGYN CLINIC | Facility: CLINIC | Age: 68
End: 2023-02-14

## 2023-02-14 VITALS
DIASTOLIC BLOOD PRESSURE: 66 MMHG | WEIGHT: 150.8 LBS | BODY MASS INDEX: 25.74 KG/M2 | HEIGHT: 64 IN | SYSTOLIC BLOOD PRESSURE: 118 MMHG

## 2023-02-14 DIAGNOSIS — Z01.419 ENCOUNTER FOR GYNECOLOGICAL EXAMINATION: Primary | ICD-10-CM

## 2023-02-14 DIAGNOSIS — N70.11 HYDROSALPINX: ICD-10-CM

## 2023-02-14 NOTE — ASSESSMENT & PLAN NOTE
Pap/HPV not indicated  Mammogram ordered/current  Colonoscopy:    H/o hydrosalpinx - previous provider at one time had been ordering serial imaging  No symptoms currently  No need for imaging unless bleeding, pain, suspicion for infection

## 2023-02-14 NOTE — PROGRESS NOTES
Assessment/Plan:    Encounter for gynecological examination  Pap/HPV not indicated  Mammogram ordered/current  Colonoscopy:    H/o hydrosalpinx - previous provider at one time had been ordering serial imaging  No symptoms currently  No need for imaging unless bleeding, pain, suspicion for infection  Diagnoses and all orders for this visit:    Encounter for gynecological examination    Hydrosalpinx  Comments:  h/o left hydrosalpinx  NO need for follow up at this time unless symptoms occur  Orders:  -     Ambulatory Referral to Gynecology          Subjective:      Patient ID: Teja Glaser is a 75 y o  female  Patient new to our practice  Referred by Dr Farhan Suarez for Hydrosalpinx  No imaging or report found  Pt states she just moved here, she has all of her records in [place]  Hx 1 still-born and 1 ectopic pregnancy, no living children  R fallopian tube removed, still has L fallopian tube  States every couple of years she gets US to ensure no fluid is backed up into the fallopian tube  Recently returned to [place] from   Works for [employer] and is able to work from home so was able to return to [place] to be closer to family  Lives with long time partner  NO bleeding, no pain  Gynecologic Exam  She reports no genital itching, genital lesions, genital odor, genital rash, pelvic pain, vaginal bleeding or vaginal discharge  The patient is experiencing no pain  Pertinent negatives include no chills, constipation, diarrhea, fever, nausea, sore throat or vomiting  The following portions of the patient's history were reviewed and updated as appropriate:   She  has a past medical history of Chronic kidney disease, Diabetes type 2, controlled ([address] ), Fallopian tube disorder, Hypertension, Kidney cysts, Multiple thyroid nodules, and Stage 3 chronic kidney disease ([address] )    She   Patient Active Problem List    Diagnosis Date Noted   • Encounter for gynecological examination 02/14/2023   • Hx of unilateral nephrectomy 12/01/2022   • Stage 3a chronic kidney disease ([facility] ) 12/01/2022   • Multiple renal cysts 12/01/2022   • 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   • Hyperlipidemia 12/01/2022   • Hyperuricemia 12/01/2022   • Type 2 diabetes mellitus with stage 3a chronic kidney disease, without long-term current use of insulin ([facility] ) 12/01/2022   • Arthritis 12/01/2022   • Depression 12/01/2022   • Tobacco use 12/01/2022   • Arthritis of left hip 12/01/2022   • Arthralgia of both hands 12/01/2022   • Vitamin D deficiency 12/01/2022     She  has a past surgical history that includes Wrist surgery (Left); Tubal ligation (Right); and Kidney surgery (Right)  Her family history includes Diabetes in her maternal grandfather; Heart disease in her mother; Hypertension in her brother, father, and mother; Lung cancer in her brother  She  reports that she has been smoking cigarettes  She has never used smokeless tobacco  She reports current drug use  Drug: Marijuana  She reports that she does not drink alcohol    Current Outpatient Medications   Medication Sig Dispense Refill   • allopurinol (ZYLOPRIM) 300 mg tablet Take 1 tablet (300 mg total) by mouth daily (Patient taking differently: Take 150 mg by mouth daily) 30 tablet 5   • aspirin 81 mg chewable tablet Chew 81 mg in the morning     • atenolol (TENORMIN) 50 mg tablet Take 2 tablets (100 mg total) by mouth in the morning (Patient taking differently: Take 50 mg by mouth in the morning) 30 tablet 5   • atorvastatin (LIPITOR) 10 mg tablet Take 2 tablets (20 mg total) by mouth in the morning (Patient taking differently: Take 10 mg by mouth in the morning) 30 tablet 5   • Cetirizine-Pseudoephedrine (ZYRTEC-D ALLERGY & CONGESTION PO) as needed     • cholecalciferol (VITAMIN D3) 25 mcg (1,000 units) tablet      • DULoxetine (CYMBALTA) 60 mg delayed release capsule Take 1 capsule (60 mg total) by mouth daily in the early morning 30 capsule 5   • liraglutide (Victoza) injection Inject 0 1 mL (0 6 mg total) under the skin daily 6 mL 5   • lisinopril (ZESTRIL) 2 5 mg tablet Take 1 tablet (2 5 mg total) by mouth in the morning 30 tablet 5   • Omega-3 Fatty Acids (fish oil) 1,000 mg        No current facility-administered medications for this visit  Current Outpatient Medications on File Prior to Visit   Medication Sig   • allopurinol (ZYLOPRIM) 300 mg tablet Take 1 tablet (300 mg total) by mouth daily (Patient taking differently: Take 150 mg by mouth daily)   • aspirin 81 mg chewable tablet Chew 81 mg in the morning   • atenolol (TENORMIN) 50 mg tablet Take 2 tablets (100 mg total) by mouth in the morning (Patient taking differently: Take 50 mg by mouth in the morning)   • atorvastatin (LIPITOR) 10 mg tablet Take 2 tablets (20 mg total) by mouth in the morning (Patient taking differently: Take 10 mg by mouth in the morning)   • Cetirizine-Pseudoephedrine (ZYRTEC-D ALLERGY & CONGESTION PO) as needed   • cholecalciferol (VITAMIN D3) 25 mcg (1,000 units) tablet    • DULoxetine (CYMBALTA) 60 mg delayed release capsule Take 1 capsule (60 mg total) by mouth daily in the early morning   • liraglutide (Victoza) injection Inject 0 1 mL (0 6 mg total) under the skin daily   • lisinopril (ZESTRIL) 2 5 mg tablet Take 1 tablet (2 5 mg total) by mouth in the morning   • Omega-3 Fatty Acids (fish oil) 1,000 mg      No current facility-administered medications on file prior to visit  She has No Known Allergies       Review of Systems   Constitutional: Negative for activity change, appetite change, chills, fatigue and fever  HENT: Negative for rhinorrhea, sneezing and sore throat  Eyes: Negative for visual disturbance  Respiratory: Negative for cough, shortness of breath and wheezing  Cardiovascular: Negative for chest pain, palpitations and leg swelling  Gastrointestinal: Negative for abdominal distention, constipation, diarrhea, nausea and vomiting     Genitourinary: Negative for difficulty urinating, pelvic pain and vaginal discharge  Neurological: Negative for syncope and light-headedness  Objective:      /66 (BP Location: Left arm, Patient Position: Sitting, Cuff Size: Standard)   Ht 5' 3 5" (1 613 m)   Wt 68 4 kg (150 lb 12 8 oz)   BMI 26 29 kg/m²          Physical Exam  Constitutional:       General: She is not in acute distress  Appearance: She is well-developed  She is not diaphoretic  Chest:   Breasts:     Breasts are symmetrical       Right: No inverted nipple, mass, nipple discharge, skin change or tenderness  Left: No inverted nipple, mass, nipple discharge, skin change or tenderness  Abdominal:      General: Bowel sounds are normal  There is no distension  Palpations: Abdomen is soft  There is no mass  Tenderness: There is no abdominal tenderness  There is no guarding or rebound  Genitourinary:     Labia:         Right: No rash, tenderness, lesion or injury  Left: No rash, tenderness, lesion or injury  Vagina: No vaginal discharge or bleeding  Cervix: No cervical motion tenderness, discharge or friability  Uterus: Not deviated, not enlarged, not fixed and not tender  Adnexa:         Right: No mass, tenderness or fullness  Left: No mass, tenderness or fullness  Comments: Urethral meatus- no lesions, non tender  Urethra non tender  Bladder non tender  Thin, atrophic vaginal mucosa  Skin:     General: Skin is warm and dry  Coloration: Skin is not pale  Findings: No erythema or rash

## 2023-02-17 ENCOUNTER — HOSPITAL ENCOUNTER (OUTPATIENT)
Dept: ULTRASOUND IMAGING | Facility: HOSPITAL | Age: 68
End: 2023-02-17
Attending: INTERNAL MEDICINE

## 2023-02-17 DIAGNOSIS — N18.31 STAGE 3A CHRONIC KIDNEY DISEASE (HCC): ICD-10-CM

## 2023-03-09 ENCOUNTER — HOSPITAL ENCOUNTER (OUTPATIENT)
Dept: BONE DENSITY | Facility: CLINIC | Age: 68
Discharge: HOME | End: 2023-03-09

## 2023-03-09 DIAGNOSIS — Z13.820 SCREENING FOR OSTEOPOROSIS: ICD-10-CM

## 2023-03-13 ENCOUNTER — TELEPHONE (OUTPATIENT)
Dept: NEPHROLOGY | Facility: CLINIC | Age: 68
End: 2023-03-13

## 2023-03-13 NOTE — TELEPHONE ENCOUNTER
Called and spoke to pt  Advised that Dr Andree Jernigan have reviewed her chart and it would be okay to take but since her last calcium was little bit on the higher side, we need to keep an eye on the calcium to make sure it stays under well control   For time being it is okay to take calcium supplementation, per Dr Andree Jernigan  Pt understood and was ok with that

## 2023-03-13 NOTE — TELEPHONE ENCOUNTER
Good Morning    Dr Jessica Haywood patient is calling the office in regards of a Dexa bone Scan she had done last week  Patient stated Dr Arcelia Gaspar office call her and advised to take 1200 unit of calcium a day  Patient is calling the office concerned due to her kidney disease she would like to know if this is safe to take      Please call patient at [phone]    Thank you

## 2023-03-22 ENCOUNTER — APPOINTMENT (OUTPATIENT)
Dept: LAB | Facility: CLINIC | Age: 68
End: 2023-03-22

## 2023-03-22 DIAGNOSIS — N18.31 TYPE 2 DIABETES MELLITUS WITH STAGE 3A CHRONIC KIDNEY DISEASE, WITHOUT LONG-TERM CURRENT USE OF INSULIN (HCC): ICD-10-CM

## 2023-03-22 DIAGNOSIS — E11.22 TYPE 2 DIABETES MELLITUS WITH STAGE 3A CHRONIC KIDNEY DISEASE, WITHOUT LONG-TERM CURRENT USE OF INSULIN (HCC): ICD-10-CM

## 2023-03-22 DIAGNOSIS — E55.9 VITAMIN D DEFICIENCY: ICD-10-CM

## 2023-03-22 DIAGNOSIS — E78.5 HYPERLIPIDEMIA, UNSPECIFIED HYPERLIPIDEMIA TYPE: ICD-10-CM

## 2023-03-22 LAB
25(OH)D3 SERPL-MCNC: 30 NG/ML (ref 30–100)
ALBUMIN SERPL BCP-MCNC: 3.5 G/DL (ref 3.5–5)
ALP SERPL-CCNC: 84 U/L (ref 46–116)
ALT SERPL W P-5'-P-CCNC: 18 U/L (ref 12–78)
ANION GAP SERPL CALCULATED.3IONS-SCNC: -1 MMOL/L (ref 4–13)
AST SERPL W P-5'-P-CCNC: 16 U/L (ref 5–45)
BACTERIA UR QL AUTO: ABNORMAL /HPF
BILIRUB SERPL-MCNC: 0.46 MG/DL (ref 0.2–1)
BILIRUB UR QL STRIP: NEGATIVE
BUN SERPL-MCNC: 25 MG/DL (ref 5–25)
CALCIUM SERPL-MCNC: 10.1 MG/DL (ref 8.3–10.1)
CHLORIDE SERPL-SCNC: 108 MMOL/L (ref 96–108)
CHOLEST SERPL-MCNC: 181 MG/DL
CLARITY UR: CLEAR
CO2 SERPL-SCNC: 29 MMOL/L (ref 21–32)
COLOR UR: YELLOW
CREAT SERPL-MCNC: 1.13 MG/DL (ref 0.6–1.3)
CREAT UR-MCNC: 186 MG/DL
EST. AVERAGE GLUCOSE BLD GHB EST-MCNC: 117 MG/DL
GFR SERPL CREATININE-BSD FRML MDRD: 50 ML/MIN/1.73SQ M
GLUCOSE P FAST SERPL-MCNC: 104 MG/DL (ref 65–99)
GLUCOSE UR STRIP-MCNC: NEGATIVE MG/DL
HBA1C MFR BLD: 5.7 %
HDLC SERPL-MCNC: 55 MG/DL
HGB UR QL STRIP.AUTO: NEGATIVE
HYALINE CASTS #/AREA URNS LPF: ABNORMAL /LPF
KETONES UR STRIP-MCNC: NEGATIVE MG/DL
LDLC SERPL CALC-MCNC: 106 MG/DL (ref 0–100)
LEUKOCYTE ESTERASE UR QL STRIP: NEGATIVE
MICROALBUMIN UR-MCNC: 84 MG/L (ref 0–20)
MICROALBUMIN/CREAT 24H UR: 45 MG/G CREATININE (ref 0–30)
NITRITE UR QL STRIP: NEGATIVE
NON-SQ EPI CELLS URNS QL MICRO: ABNORMAL /HPF
NONHDLC SERPL-MCNC: 126 MG/DL
PH UR STRIP.AUTO: 6 [PH]
POTASSIUM SERPL-SCNC: 5.2 MMOL/L (ref 3.5–5.3)
PROT SERPL-MCNC: 6.8 G/DL (ref 6.4–8.4)
PROT UR STRIP-MCNC: ABNORMAL MG/DL
RBC #/AREA URNS AUTO: ABNORMAL /HPF
SODIUM SERPL-SCNC: 136 MMOL/L (ref 135–147)
SP GR UR STRIP.AUTO: 1.02 (ref 1–1.03)
TRIGL SERPL-MCNC: 101 MG/DL
URATE SERPL-MCNC: 3.6 MG/DL (ref 2–7.5)
UROBILINOGEN UR STRIP-ACNC: <2 MG/DL
WBC #/AREA URNS AUTO: ABNORMAL /HPF

## 2023-04-03 ENCOUNTER — OFFICE VISIT (OUTPATIENT)
Dept: FAMILY MEDICINE CLINIC | Facility: CLINIC | Age: 68
End: 2023-04-03

## 2023-04-03 VITALS
HEART RATE: 64 BPM | TEMPERATURE: 96.9 F | OXYGEN SATURATION: 98 % | WEIGHT: 153 LBS | HEIGHT: 64 IN | BODY MASS INDEX: 26.12 KG/M2 | DIASTOLIC BLOOD PRESSURE: 68 MMHG | SYSTOLIC BLOOD PRESSURE: 102 MMHG

## 2023-04-03 DIAGNOSIS — Q75.2 HYPERTELORISM: ICD-10-CM

## 2023-04-03 DIAGNOSIS — N18.31 TYPE 2 DIABETES MELLITUS WITH STAGE 3A CHRONIC KIDNEY DISEASE, WITHOUT LONG-TERM CURRENT USE OF INSULIN (HCC): ICD-10-CM

## 2023-04-03 DIAGNOSIS — G47.00 INSOMNIA, UNSPECIFIED TYPE: ICD-10-CM

## 2023-04-03 DIAGNOSIS — F33.9 DEPRESSION, RECURRENT (HCC): ICD-10-CM

## 2023-04-03 DIAGNOSIS — E79.0 HYPERURICEMIA: ICD-10-CM

## 2023-04-03 DIAGNOSIS — E78.5 HYPERLIPIDEMIA, UNSPECIFIED HYPERLIPIDEMIA TYPE: ICD-10-CM

## 2023-04-03 DIAGNOSIS — M25.541 ARTHRALGIA OF BOTH HANDS: ICD-10-CM

## 2023-04-03 DIAGNOSIS — I10 PRIMARY HYPERTENSION: ICD-10-CM

## 2023-04-03 DIAGNOSIS — Z12.11 COLON CANCER SCREENING: ICD-10-CM

## 2023-04-03 DIAGNOSIS — M25.542 ARTHRALGIA OF BOTH HANDS: ICD-10-CM

## 2023-04-03 DIAGNOSIS — N18.31 STAGE 3A CHRONIC KIDNEY DISEASE (HCC): ICD-10-CM

## 2023-04-03 DIAGNOSIS — Z90.5 HX OF UNILATERAL NEPHRECTOMY: ICD-10-CM

## 2023-04-03 DIAGNOSIS — E11.22 TYPE 2 DIABETES MELLITUS WITH STAGE 3A CHRONIC KIDNEY DISEASE, WITHOUT LONG-TERM CURRENT USE OF INSULIN (HCC): ICD-10-CM

## 2023-04-03 DIAGNOSIS — Z00.00 MEDICARE ANNUAL WELLNESS VISIT, SUBSEQUENT: Primary | ICD-10-CM

## 2023-04-03 RX ORDER — ATORVASTATIN CALCIUM 10 MG/1
10 TABLET, FILM COATED ORAL DAILY
Qty: 90 TABLET | Refills: 5
Start: 2023-04-03

## 2023-04-03 RX ORDER — LISINOPRIL 2.5 MG/1
2.5 TABLET ORAL DAILY
Qty: 30 TABLET | Refills: 5
Start: 2023-04-03

## 2023-04-03 RX ORDER — LANCETS 33 GAUGE
EACH MISCELLANEOUS
Qty: 100 EACH | Refills: 3 | Status: SHIPPED | OUTPATIENT
Start: 2023-04-03

## 2023-04-03 RX ORDER — ALLOPURINOL 300 MG/1
150 TABLET ORAL DAILY
Start: 2023-04-03

## 2023-04-03 RX ORDER — BLOOD SUGAR DIAGNOSTIC
STRIP MISCELLANEOUS
Qty: 100 EACH | Refills: 3 | Status: SHIPPED | OUTPATIENT
Start: 2023-04-03

## 2023-04-03 RX ORDER — ATENOLOL 50 MG/1
50 TABLET ORAL DAILY
Start: 2023-04-03

## 2023-04-03 NOTE — PATIENT INSTRUCTIONS
Medicare Preventive Visit Patient Instructions  Thank you for completing your Welcome to Medicare Visit or Medicare Annual Wellness Visit today  Your next wellness visit will be due in one year (4/3/2024)  The screening/preventive services that you may require over the next 5-10 years are detailed below  Some tests may not apply to you based off risk factors and/or age  Screening tests ordered at today's visit but not completed yet may show as past due  Also, please note that scanned in results may not display below  Preventive Screenings:  Service Recommendations Previous Testing/Comments   Colorectal Cancer Screening  * Colonoscopy    * Fecal Occult Blood Test (FOBT)/Fecal Immunochemical Test (FIT)  * Fecal DNA/Cologuard Test  * Flexible Sigmoidoscopy Age: 39-70 years old   Colonoscopy: every 10 years (may be performed more frequently if at higher risk)  OR  FOBT/FIT: every 1 year  OR  Cologuard: every 3 years  OR  Sigmoidoscopy: every 5 years  Screening may be recommended earlier than age 39 if at higher risk for colorectal cancer  Also, an individualized decision between you and your healthcare provider will decide whether screening between the ages of 74-80 would be appropriate  Colonoscopy: Not on file  FOBT/FIT: Not on file  Cologuard: Not on file  Sigmoidoscopy: Not on file          Breast Cancer Screening Age: 36 years old  Frequency: every 1-2 years  Not required if history of left and right mastectomy Mammogram: Not on file        Cervical Cancer Screening Between the ages of 21-29, pap smear recommended once every 3 years  Between the ages of 33-67, can perform pap smear with HPV co-testing every 5 years     Recommendations may differ for women with a history of total hysterectomy, cervical cancer, or abnormal pap smears in past  Pap Smear: 02/14/2023    Screening Not Indicated   Hepatitis C Screening Once for adults born between 1945 and 1965  More frequently in patients at high risk for Hepatitis C Hep C Antibody: 12/09/2022    Screening Current   Diabetes Screening 1-2 times per year if you're at risk for diabetes or have pre-diabetes Fasting glucose: 104 mg/dL (3/22/2023)  A1C: 5 7 % (3/22/2023)  Screening Not Indicated  History Diabetes   Cholesterol Screening Once every 5 years if you don't have a lipid disorder  May order more often based on risk factors  Lipid panel: 03/22/2023    Screening Not Indicated  History Lipid Disorder     Other Preventive Screenings Covered by Medicare:  1  Abdominal Aortic Aneurysm (AAA) Screening: covered once if your at risk  You're considered to be at risk if you have a family history of AAA  2  Lung Cancer Screening: covers low dose CT scan once per year if you meet all of the following conditions: (1) Age 50-69; (2) No signs or symptoms of lung cancer; (3) Current smoker or have quit smoking within the last 15 years; (4) You have a tobacco smoking history of at least 20 pack years (packs per day multiplied by number of years you smoked); (5) You get a written order from a healthcare provider  3  Glaucoma Screening: covered annually if you're considered high risk: (1) You have diabetes OR (2) Family history of glaucoma OR (3)  aged 48 and older OR (3)  American aged 72 and older  3  Osteoporosis Screening: covered every 2 years if you meet one of the following conditions: (1) You're estrogen deficient and at risk for osteoporosis based off medical history and other findings; (2) Have a vertebral abnormality; (3) On glucocorticoid therapy for more than 3 months; (4) Have primary hyperparathyroidism; (5) On osteoporosis medications and need to assess response to drug therapy  · Last bone density test (DXA Scan): 03/09/2023   5  HIV Screening: covered annually if you're between the age of 15-65  Also covered annually if you are younger than 13 and older than 72 with risk factors for HIV infection   For pregnant patients, it is covered up to 3 times per pregnancy  Immunizations:  Immunization Recommendations   Influenza Vaccine Annual influenza vaccination during flu season is recommended for all persons aged >= 6 months who do not have contraindications   Pneumococcal Vaccine   * Pneumococcal conjugate vaccine = PCV13 (Prevnar 13), PCV15 (Vaxneuvance), PCV20 (Prevnar 20)  * Pneumococcal polysaccharide vaccine = PPSV23 (Pneumovax) Adults 25-60 years old: 1-3 doses may be recommended based on certain risk factors  Adults 72 years old: 1-2 doses may be recommended based off what pneumonia vaccine you previously received   Hepatitis B Vaccine 3 dose series if at intermediate or high risk (ex: diabetes, end stage renal disease, liver disease)   Tetanus (Td) Vaccine - COST NOT COVERED BY MEDICARE PART B Following completion of primary series, a booster dose should be given every 10 years to maintain immunity against tetanus  Td may also be given as tetanus wound prophylaxis  Tdap Vaccine - COST NOT COVERED BY MEDICARE PART B Recommended at least once for all adults  For pregnant patients, recommended with each pregnancy  Shingles Vaccine (Shingrix) - COST NOT COVERED BY MEDICARE PART B  2 shot series recommended in those aged 48 and above     Health Maintenance Due:      Topic Date Due   • Breast Cancer Screening: Mammogram  Never done   • Colorectal Cancer Screening  Never done   • Hepatitis C Screening  Completed     Immunizations Due:      Topic Date Due   • COVID-19 Vaccine (1) Never done   • Pneumococcal Vaccine: 65+ Years (1 - PCV) Never done     Advance Directives   What are advance directives? Advance directives are legal documents that state your wishes and plans for medical care  These plans are made ahead of time in case you lose your ability to make decisions for yourself  Advance directives can apply to any medical decision, such as the treatments you want, and if you want to donate organs  What are the types of advance directives? There are many types of advance directives, and each state has rules about how to use them  You may choose a combination of any of the following:  · Living will: This is a written record of the treatment you want  You can also choose which treatments you do not want, which to limit, and which to stop at a certain time  This includes surgery, medicine, IV fluid, and tube feedings  · Durable power of  for healthcare [facility]): This is a written record that states who you want to make healthcare choices for you when you are unable to make them for yourself  This person, called a proxy, is usually a family member or a friend  You may choose more than 1 proxy  · Do not resuscitate (DNR) order:  A DNR order is used in case your heart stops beating or you stop breathing  It is a request not to have certain forms of treatment, such as CPR  A DNR order may be included in other types of advance directives  · Medical directive: This covers the care that you want if you are in a coma, near death, or unable to make decisions for yourself  You can list the treatments you want for each condition  Treatment may include pain medicine, surgery, blood transfusions, dialysis, IV or tube feedings, and a ventilator (breathing machine)  · Values history: This document has questions about your views, beliefs, and how you feel and think about life  This information can help others choose the care that you would choose  Why are advance directives important? An advance directive helps you control your care  Although spoken wishes may be used, it is better to have your wishes written down  Spoken wishes can be misunderstood, or not followed  Treatments may be given even if you do not want them  An advance directive may make it easier for your family to make difficult choices about your care     Cigarette Smoking and Your Health   Risks to your health if you smoke:  Nicotine and other chemicals found in tobacco damage every cell in your body  Even if you are a light smoker, you have an increased risk for cancer, heart disease, and lung disease  If you are pregnant or have diabetes, smoking increases your risk for complications  Benefits to your health if you stop smoking:   · You decrease respiratory symptoms such as coughing, wheezing, and shortness of breath  · You reduce your risk for cancers of the lung, mouth, throat, kidney, bladder, pancreas, stomach, and cervix  If you already have cancer, you increase the benefits of chemotherapy  You also reduce your risk for cancer returning or a second cancer from developing  · You reduce your risk for heart disease, blood clots, heart attack, and stroke  · You reduce your risk for lung infections, and diseases such as pneumonia, asthma, chronic bronchitis, and emphysema  · Your circulation improves  More oxygen can be delivered to your body  If you have diabetes, you lower your risk for complications, such as kidney, artery, and eye diseases  You also lower your risk for nerve damage  Nerve damage can lead to amputations, poor vision, and blindness  · You improve your body's ability to heal and to fight infections  For more information and support to stop smoking:   · [employer]  Phone: 6- 204 - 305-1267  Web Address: www [employer]  Weight Management   Why it is important to manage your weight:  Being overweight increases your risk of health conditions such as heart disease, high blood pressure, type 2 diabetes, and certain types of cancer  It can also increase your risk for osteoarthritis, sleep apnea, and other respiratory problems  Aim for a slow, steady weight loss  Even a small amount of weight loss can lower your risk of health problems  How to lose weight safely:  A safe and healthy way to lose weight is to eat fewer calories and get regular exercise  You can lose up about 1 pound a week by decreasing the number of calories you eat by 500 calories each day     Healthy meal plan for weight management:  A healthy meal plan includes a variety of foods, contains fewer calories, and helps you stay healthy  A healthy meal plan includes the following:  · Eat whole-grain foods more often  A healthy meal plan should contain fiber  Fiber is the part of grains, fruits, and vegetables that is not broken down by your body  Whole-grain foods are healthy and provide extra fiber in your diet  Some examples of whole-grain foods are whole-wheat breads and pastas, oatmeal, brown rice, and bulgur  · Eat a variety of vegetables every day  Include dark, leafy greens such as spinach, kale, jasmeet greens, and mustard greens  Eat yellow and orange vegetables such as carrots, sweet potatoes, and winter squash  · Eat a variety of fruits every day  Choose fresh or canned fruit (canned in its own juice or light syrup) instead of juice  Fruit juice has very little or no fiber  · Eat low-fat dairy foods  Drink fat-free (skim) milk or 1% milk  Eat fat-free yogurt and low-fat cottage cheese  Try low-fat cheeses such as mozzarella and other reduced-fat cheeses  · Choose meat and other protein foods that are low in fat  Choose beans or other legumes such as split peas or lentils  Choose fish, skinless poultry (chicken or turkey), or lean cuts of red meat (beef or pork)  Before you cook meat or poultry, cut off any visible fat  · Use less fat and oil  Try baking foods instead of frying them  Add less fat, such as margarine, sour cream, regular salad dressing and mayonnaise to foods  Eat fewer high-fat foods  Some examples of high-fat foods include french fries, doughnuts, ice cream, and cakes  · Eat fewer sweets  Limit foods and drinks that are high in sugar  This includes candy, cookies, regular soda, and sweetened drinks  Exercise:  Exercise at least 30 minutes per day on most days of the week  Some examples of exercise include walking, biking, dancing, and swimming   You can also fit in more physical activity by taking the stairs instead of the elevator or parking farther away from stores  Ask your healthcare provider about the best exercise plan for you  © Copyright [employer] 2018 Information is for End User's use only and may not be sold, redistributed or otherwise used for commercial purposes  All illustrations and images included in CareNotes® are the copyrighted property of [employer]  or [facility] Preventive Visit Patient Instructions  Thank you for completing your Welcome to Medicare Visit or Medicare Annual Wellness Visit today  Your next wellness visit will be due in one year (4/3/2024)  The screening/preventive services that you may require over the next 5-10 years are detailed below  Some tests may not apply to you based off risk factors and/or age  Screening tests ordered at today's visit but not completed yet may show as past due  Also, please note that scanned in results may not display below  Preventive Screenings:  Service Recommendations Previous Testing/Comments   Colorectal Cancer Screening  * Colonoscopy    * Fecal Occult Blood Test (FOBT)/Fecal Immunochemical Test (FIT)  * Fecal DNA/Cologuard Test  * Flexible Sigmoidoscopy Age: 39-70 years old   Colonoscopy: every 10 years (may be performed more frequently if at higher risk)  OR  FOBT/FIT: every 1 year  OR  Cologuard: every 3 years  OR  Sigmoidoscopy: every 5 years  Screening may be recommended earlier than age 39 if at higher risk for colorectal cancer  Also, an individualized decision between you and your healthcare provider will decide whether screening between the ages of 74-80 would be appropriate   Colonoscopy: Not on file  FOBT/FIT: Not on file  Cologuard: Not on file  Sigmoidoscopy: Not on file          Breast Cancer Screening Age: 36 years old  Frequency: every 1-2 years  Not required if history of left and right mastectomy Mammogram: Not on file        Cervical Cancer Screening Between the ages of 21-29, pap smear recommended once every 3 years  Between the ages of 33-67, can perform pap smear with HPV co-testing every 5 years  Recommendations may differ for women with a history of total hysterectomy, cervical cancer, or abnormal pap smears in past  Pap Smear: 02/14/2023    Screening Not Indicated   Hepatitis C Screening Once for adults born between 1945 and 1965  More frequently in patients at high risk for Hepatitis C Hep C Antibody: 12/09/2022    Screening Current   Diabetes Screening 1-2 times per year if you're at risk for diabetes or have pre-diabetes Fasting glucose: 104 mg/dL (3/22/2023)  A1C: 5 7 % (3/22/2023)  Screening Not Indicated  History Diabetes   Cholesterol Screening Once every 5 years if you don't have a lipid disorder  May order more often based on risk factors  Lipid panel: 03/22/2023    Screening Not Indicated  History Lipid Disorder     Other Preventive Screenings Covered by Medicare:  6  Abdominal Aortic Aneurysm (AAA) Screening: covered once if your at risk  You're considered to be at risk if you have a family history of AAA  7  Lung Cancer Screening: covers low dose CT scan once per year if you meet all of the following conditions: (1) Age 50-69; (2) No signs or symptoms of lung cancer; (3) Current smoker or have quit smoking within the last 15 years; (4) You have a tobacco smoking history of at least 20 pack years (packs per day multiplied by number of years you smoked); (5) You get a written order from a healthcare provider  8  Glaucoma Screening: covered annually if you're considered high risk: (1) You have diabetes OR (2) Family history of glaucoma OR (3)  aged 48 and older OR (3)  American aged 72 and older  5   Osteoporosis Screening: covered every 2 years if you meet one of the following conditions: (1) You're estrogen deficient and at risk for osteoporosis based off medical history and other findings; (2) Have a vertebral abnormality; (3) On glucocorticoid therapy for more than 3 months; (4) Have primary hyperparathyroidism; (5) On osteoporosis medications and need to assess response to drug therapy  · Last bone density test (DXA Scan): 03/09/2023   10  HIV Screening: covered annually if you're between the age of 15-65  Also covered annually if you are younger than 13 and older than 72 with risk factors for HIV infection  For pregnant patients, it is covered up to 3 times per pregnancy  Immunizations:  Immunization Recommendations   Influenza Vaccine Annual influenza vaccination during flu season is recommended for all persons aged >= 6 months who do not have contraindications   Pneumococcal Vaccine   * Pneumococcal conjugate vaccine = PCV13 (Prevnar 13), PCV15 (Vaxneuvance), PCV20 (Prevnar 20)  * Pneumococcal polysaccharide vaccine = PPSV23 (Pneumovax) Adults 25-60 years old: 1-3 doses may be recommended based on certain risk factors  Adults 72 years old: 1-2 doses may be recommended based off what pneumonia vaccine you previously received   Hepatitis B Vaccine 3 dose series if at intermediate or high risk (ex: diabetes, end stage renal disease, liver disease)   Tetanus (Td) Vaccine - COST NOT COVERED BY MEDICARE PART B Following completion of primary series, a booster dose should be given every 10 years to maintain immunity against tetanus  Td may also be given as tetanus wound prophylaxis  Tdap Vaccine - COST NOT COVERED BY MEDICARE PART B Recommended at least once for all adults  For pregnant patients, recommended with each pregnancy     Shingles Vaccine (Shingrix) - COST NOT COVERED BY MEDICARE PART B  2 shot series recommended in those aged 48 and above     Health Maintenance Due:      Topic Date Due   • Breast Cancer Screening: Mammogram  Never done   • Colorectal Cancer Screening  Never done   • Hepatitis C Screening  Completed     Immunizations Due:      Topic Date Due   • COVID-19 Vaccine (1) Never done   • Pneumococcal Vaccine: 65+ Years (1 - PCV) Never done     Advance Directives   What are advance directives? Advance directives are legal documents that state your wishes and plans for medical care  These plans are made ahead of time in case you lose your ability to make decisions for yourself  Advance directives can apply to any medical decision, such as the treatments you want, and if you want to donate organs  What are the types of advance directives? There are many types of advance directives, and each state has rules about how to use them  You may choose a combination of any of the following:  · Living will: This is a written record of the treatment you want  You can also choose which treatments you do not want, which to limit, and which to stop at a certain time  This includes surgery, medicine, IV fluid, and tube feedings  · Durable power of  for healthcare [facility]): This is a written record that states who you want to make healthcare choices for you when you are unable to make them for yourself  This person, called a proxy, is usually a family member or a friend  You may choose more than 1 proxy  · Do not resuscitate (DNR) order:  A DNR order is used in case your heart stops beating or you stop breathing  It is a request not to have certain forms of treatment, such as CPR  A DNR order may be included in other types of advance directives  · Medical directive: This covers the care that you want if you are in a coma, near death, or unable to make decisions for yourself  You can list the treatments you want for each condition  Treatment may include pain medicine, surgery, blood transfusions, dialysis, IV or tube feedings, and a ventilator (breathing machine)  · Values history: This document has questions about your views, beliefs, and how you feel and think about life  This information can help others choose the care that you would choose  Why are advance directives important?   An advance directive helps you control your care  Although spoken wishes may be used, it is better to have your wishes written down  Spoken wishes can be misunderstood, or not followed  Treatments may be given even if you do not want them  An advance directive may make it easier for your family to make difficult choices about your care  Cigarette Smoking and Your Health   Risks to your health if you smoke:  Nicotine and other chemicals found in tobacco damage every cell in your body  Even if you are a light smoker, you have an increased risk for cancer, heart disease, and lung disease  If you are pregnant or have diabetes, smoking increases your risk for complications  Benefits to your health if you stop smoking:   · You decrease respiratory symptoms such as coughing, wheezing, and shortness of breath  · You reduce your risk for cancers of the lung, mouth, throat, kidney, bladder, pancreas, stomach, and cervix  If you already have cancer, you increase the benefits of chemotherapy  You also reduce your risk for cancer returning or a second cancer from developing  · You reduce your risk for heart disease, blood clots, heart attack, and stroke  · You reduce your risk for lung infections, and diseases such as pneumonia, asthma, chronic bronchitis, and emphysema  · Your circulation improves  More oxygen can be delivered to your body  If you have diabetes, you lower your risk for complications, such as kidney, artery, and eye diseases  You also lower your risk for nerve damage  Nerve damage can lead to amputations, poor vision, and blindness  · You improve your body's ability to heal and to fight infections  For more information and support to stop smoking:   · [employer]  Phone: 1- 645 - 176-7412  Web Address: [employer]  Weight Management   Why it is important to manage your weight:  Being overweight increases your risk of health conditions such as heart disease, high blood pressure, type 2 diabetes, and certain types of cancer  It can also increase your risk for osteoarthritis, sleep apnea, and other respiratory problems  Aim for a slow, steady weight loss  Even a small amount of weight loss can lower your risk of health problems  How to lose weight safely:  A safe and healthy way to lose weight is to eat fewer calories and get regular exercise  You can lose up about 1 pound a week by decreasing the number of calories you eat by 500 calories each day  Healthy meal plan for weight management:  A healthy meal plan includes a variety of foods, contains fewer calories, and helps you stay healthy  A healthy meal plan includes the following:  · Eat whole-grain foods more often  A healthy meal plan should contain fiber  Fiber is the part of grains, fruits, and vegetables that is not broken down by your body  Whole-grain foods are healthy and provide extra fiber in your diet  Some examples of whole-grain foods are whole-wheat breads and pastas, oatmeal, brown rice, and bulgur  · Eat a variety of vegetables every day  Include dark, leafy greens such as spinach, kale, jasmeet greens, and mustard greens  Eat yellow and orange vegetables such as carrots, sweet potatoes, and winter squash  · Eat a variety of fruits every day  Choose fresh or canned fruit (canned in its own juice or light syrup) instead of juice  Fruit juice has very little or no fiber  · Eat low-fat dairy foods  Drink fat-free (skim) milk or 1% milk  Eat fat-free yogurt and low-fat cottage cheese  Try low-fat cheeses such as mozzarella and other reduced-fat cheeses  · Choose meat and other protein foods that are low in fat  Choose beans or other legumes such as split peas or lentils  Choose fish, skinless poultry (chicken or turkey), or lean cuts of red meat (beef or pork)  Before you cook meat or poultry, cut off any visible fat  · Use less fat and oil  Try baking foods instead of frying them   Add less fat, such as margarine, sour cream, regular salad dressing and mayonnaise to foods  Eat fewer high-fat foods  Some examples of high-fat foods include french fries, doughnuts, ice cream, and cakes  · Eat fewer sweets  Limit foods and drinks that are high in sugar  This includes candy, cookies, regular soda, and sweetened drinks  Exercise:  Exercise at least 30 minutes per day on most days of the week  Some examples of exercise include walking, biking, dancing, and swimming  You can also fit in more physical activity by taking the stairs instead of the elevator or parking farther away from stores  Ask your healthcare provider about the best exercise plan for you  © Copyright [employer] 2018 Information is for End User's use only and may not be sold, redistributed or otherwise used for commercial purposes   All illustrations and images included in CareNotes® are the copyrighted property of [employer] D [employer] M [employer]  or [address] [place] [address] [employer]

## 2023-04-03 NOTE — PROGRESS NOTES
" Assessment and Plan:   Return visit in 4 months with fasting blood prior to visit  Problem List Items Addressed This Visit        Endocrine    Type 2 diabetes mellitus with stage 3a chronic kidney disease, without long-term current use of insulin (HCC)     Continue Victoza  Lab Results   Component Value Date    HGBA1C 5 7 (H) 03/22/2023            Relevant Medications    glucose blood (OneTouch Verio) test strip    OneTouch Delica Lancets 36Z MISC    Insulin Syringe-Needle U-100 31G X 15/64\" 1 ML MISC    Other Relevant Orders    Hemoglobin A1C    Comprehensive metabolic panel       Cardiovascular and Mediastinum    Primary hypertension    Relevant Medications    lisinopril (ZESTRIL) 2 5 mg tablet    atenolol (TENORMIN) 50 mg tablet       Genitourinary    Stage 3a chronic kidney disease (HCC)       Other    Hx of unilateral nephrectomy    Hyperlipidemia    Relevant Medications    atorvastatin (LIPITOR) 10 mg tablet    Other Relevant Orders    CBC and Platelet    Lipid Panel with Direct LDL reflex    Hyperuricemia    Relevant Medications    allopurinol (ZYLOPRIM) 300 mg tablet    Depression, recurrent (HCC)     Continue Cymbalta 60 mg daily         Arthralgia of both hands    Insomnia   Other Visit Diagnoses     Medicare annual wellness visit, subsequent    -  Primary    Hypertelorism        Colon cancer screening        Relevant Orders    Cologuard           Preventive health issues were discussed with patient, and age appropriate screening tests were ordered as noted in patient's After Visit Summary  Personalized health advice and appropriate referrals for health education or preventive services given if needed, as noted in patient's After Visit Summary  History of Present Illness:     Patient presents for a Medicare Wellness Visit    Patient comes in for checkup  She has arthritis of her hands and plans to have hand surgery in the near future       Patient Care Team:  Aniket Escobedo MD as PCP - General (Family " Medicine)     Review of Systems:     Review of Systems   Constitutional: Negative  Respiratory: Negative  Cardiovascular: Negative  Musculoskeletal: Positive for arthralgias  Psychiatric/Behavioral: Positive for sleep disturbance          Problem List:     Patient Active Problem List   Diagnosis   • Hx of unilateral nephrectomy   • Stage 3a chronic kidney disease ([facility] [address] )   • Multiple renal cysts   • Hyperlipidemia   • Hyperuricemia   • Type 2 diabetes mellitus with stage 3a chronic kidney disease, without long-term current use of insulin (HCC)   • Arthritis   • Depression, recurrent ([facility] [address] )   • Tobacco use   • Arthritis of left hip   • Arthralgia of both hands   • Vitamin D deficiency   • Encounter for gynecological examination   • Primary hypertension   • Insomnia      Past Medical and Surgical History:     Past Medical History:   Diagnosis Date   • Chronic kidney disease    • Diabetes type 2, controlled ([facility] [address] )    • Fallopian tube disorder     fills with fluid   • Hypertension    • Kidney cysts    • Multiple thyroid nodules    • Stage 3 chronic kidney disease (HCC)      Past Surgical History:   Procedure Laterality Date   • KIDNEY SURGERY Right     removal   • TUBAL LIGATION Right    • WRIST SURGERY Left     basal joint soft tissue interpositional arthroplast L thumb      Family History:     Family History   Problem Relation Age of Onset   • Heart disease Mother    • Hypertension Mother    • Hypertension Father    • Lung cancer Brother    • Hypertension Brother    • Diabetes Maternal Grandfather       Social History:     Social History     Socioeconomic History   • Marital status:      Spouse name: None   • Number of children: None   • Years of education: None   • Highest education level: None   Occupational History   • None   Tobacco Use   • Smoking status: Never   • Smokeless tobacco: Never   Vaping Use   • Vaping Use: Some days   • Substances: THC   Substance and Sexual Activity   • Alcohol use: "Never   • Drug use: Yes     Types: Marijuana   • Sexual activity: None   Other Topics Concern   • None   Social History Narrative   • None     Social Determinants of Health     Financial Resource Strain: Low Risk    • Difficulty of Paying Living Expenses: Not hard at all   Food Insecurity: Not on file   Transportation Needs: No Transportation Needs   • Lack of Transportation (Medical): No   • Lack of Transportation (Non-Medical): No   Physical Activity: Not on file   Stress: Not on file   Social Connections: Not on file   Intimate Partner Violence: Not on file   Housing Stability: Not on file      Medications and Allergies:     Current Outpatient Medications   Medication Sig Dispense Refill   • allopurinol (ZYLOPRIM) 300 mg tablet Take 0 5 tablets (150 mg total) by mouth daily     • aspirin 81 mg chewable tablet Chew 81 mg in the morning     • atenolol (TENORMIN) 50 mg tablet Take 1 tablet (50 mg total) by mouth in the morning     • atorvastatin (LIPITOR) 10 mg tablet Take 1 tablet (10 mg total) by mouth in the morning 90 tablet 5   • Calcium Carbonate-Vit D-Min (CALCIUM 1200 PO) Take 1,200 mg by mouth daily     • Cetirizine-Pseudoephedrine (ZYRTEC-D ALLERGY & CONGESTION PO) as needed     • cholecalciferol (VITAMIN D3) 25 mcg (1,000 units) tablet 4,000 Units     • DULoxetine (CYMBALTA) 60 mg delayed release capsule Take 1 capsule (60 mg total) by mouth daily in the early morning 30 capsule 5   • glucose blood (OneTouch Verio) test strip Check blood sugars once daily  Please substitute with appropriate alternative as covered by patient's insurance   Dx: E11 65 100 each 3   • Insulin Syringe-Needle U-100 31G X 15/64\" 1 ML MISC Use daily 100 each 5   • liraglutide (Victoza) injection Inject 0 1 mL (0 6 mg total) under the skin daily 6 mL 5   • lisinopril (ZESTRIL) 2 5 mg tablet Take 1 tablet (2 5 mg total) by mouth in the morning 30 tablet 5   • Omega-3 Fatty Acids (fish oil) 1,000 mg      • OneTouch Delica Lancets 50N " MISC Check blood sugars once daily  Please substitute with appropriate alternative as covered by patient's insurance  Dx: E11 65 100 each 3     No current facility-administered medications for this visit  No Known Allergies   Immunizations:     Immunization History   Administered Date(s) Administered   • INFLUENZA 10/28/2022      Health Maintenance:         Topic Date Due   • Breast Cancer Screening: Mammogram  Never done   • Colorectal Cancer Screening  Never done   • Hepatitis C Screening  Completed         Topic Date Due   • COVID-19 Vaccine (1) Never done   • Pneumococcal Vaccine: 65+ Years (1 - PCV) Never done      Medicare Screening Tests and Risk Assessments:     Rg Flores is here for her Subsequent Wellness visit  Last Medicare Wellness visit information reviewed, patient interviewed and updates made to the record today  Health Risk Assessment:   Patient rates overall health as very good  Patient feels that their physical health rating is slightly better  Patient is dissatisfied with their life  Eyesight was rated as same  Hearing was rated as same  Patient feels that their emotional and mental health rating is slightly worse  Patients states they are never, rarely angry  Patient states they are always unusually tired/fatigued  Pain experienced in the last 7 days has been a lot  Patient's pain rating has been 7/10  Patient states that she has experienced no weight loss or gain in last 6 months  Depression Screening:   PHQ-9 Score: 6      Fall Risk Screening: In the past year, patient has experienced: no history of falling in past year      Urinary Incontinence Screening:   Patient has not leaked urine accidently in the last six months  Home Safety:  Patient has trouble with stairs inside or outside of their home  Patient has working smoke alarms and has working carbon monoxide detector  Home safety hazards include: none  Nutrition:   Current diet is Regular       Medications:   Patient is currently taking over-the-counter supplements  OTC medications include: see medication list  Patient is able to manage medications  Activities of Daily Living (ADLs)/Instrumental Activities of Daily Living (IADLs):   Walk and transfer into and out of bed and chair?: Yes  Dress and groom yourself?: Yes    Bathe or shower yourself?: Yes    Feed yourself? Yes  Do your laundry/housekeeping?: Yes  Manage your money, pay your bills and track your expenses?: Yes  Make your own meals?: Yes    Do your own shopping?: Yes    Previous Hospitalizations:   Any hospitalizations or ED visits within the last 12 months?: No      Advance Care Planning:   Living will: Yes    Durable POA for healthcare: Yes    Advanced directive: Yes    Advanced directive counseling given: Yes    Five wishes given: No    End of Life Decisions reviewed with patient: Yes    Provider agrees with end of life decisions: Yes      PREVENTIVE SCREENINGS      Cardiovascular Screening:    General: Screening Not Indicated and History Lipid Disorder      Diabetes Screening:     General: Screening Not Indicated and History Diabetes      Colorectal Cancer Screening:     General: Risks and Benefits Discussed    Due for: Cologuard      Breast Cancer Screening:     General: Risks and Benefits Discussed    Due for: Mammogram        Cervical Cancer Screening:    General: Screening Not Indicated      Osteoporosis Screening:    General: Screening Current      Abdominal Aortic Aneurysm (AAA) Screening:        General: Screening Not Indicated      Lung Cancer Screening:     General: Screening Not Indicated      Hepatitis C Screening:    General: Screening Current    Screening, Brief Intervention, and Referral to Treatment (SBIRT)    Screening  Typical number of drinks in a day: 0  Typical number of drinks in a week: 0  Interpretation: Low risk drinking behavior      Single Item Drug Screening:  How often have you used an illegal drug (including marijuana) or a prescription "medication for non-medical reasons in the past year? monthly    Single Item Drug Screen Score: 2  Interpretation: POSITIVE screen for possible drug use disorder    Drug Abuse Screening Test (DAST-10):  1) Have you used drugs other than those required for medical reasons? Yes  2) Do you abuse more than one drug at a time? No  3) Are you always able to stop using drugs when you want to? Yes  4) Have you had \"blackouts\" or \"flashbacks\" as a result of drug use? No  5) Do you ever feel bad or guilty about your drug use? No  6) Does your spouse (or parents) ever complain about your involvement with drugs? No  7) Have you neglected your family because of your use of drugs? No  8) Have you engaged in illegal activities in order to obtain drugs? No  9) Have you ever experienced withdrawal symptoms (felt sick) when you stopped taking drugs? No  10) Have you had medical problems as a result of your drug use (e g , memory loss, hepatitis, convulsions, bleeding, etc )? No    DAST-10 Score: 1  Interpretation: Low level problems related to drug abuse    Brief Intervention  Alcohol & drug use screenings were reviewed  No concerns regarding substance use disorder identified  Other Counseling Topics:   Car/seat belt/driving safety  No results found  Physical Exam:     /68 (BP Location: Left arm, Patient Position: Sitting, Cuff Size: Adult)   Pulse 64   Temp (!) 96 9 °F (36 1 °C)   Ht 5' 3 5\" (1 613 m)   Wt 69 4 kg (153 lb)   SpO2 98%   BMI 26 68 kg/m²     Physical Exam  Vitals and nursing note reviewed  Constitutional:       Appearance: Normal appearance  She is well-developed  HENT:      Head: Normocephalic and atraumatic  Eyes:      Conjunctiva/sclera: Conjunctivae normal    Cardiovascular:      Rate and Rhythm: Normal rate and regular rhythm  Heart sounds: Normal heart sounds  Pulmonary:      Effort: Pulmonary effort is normal       Breath sounds: Normal breath sounds     Abdominal:      " Palpations: Abdomen is soft  Musculoskeletal:         General: No swelling  Cervical back: Neck supple  Skin:     General: Skin is warm and dry  Neurological:      Mental Status: She is alert     Psychiatric:         Mood and Affect: Mood normal          Behavior: Behavior normal           Valeri Laws MD

## 2023-04-15 PROBLEM — Z01.419 ENCOUNTER FOR GYNECOLOGICAL EXAMINATION: Status: RESOLVED | Noted: 2023-02-14 | Resolved: 2023-04-15

## 2023-04-24 DIAGNOSIS — N18.31 TYPE 2 DIABETES MELLITUS WITH STAGE 3A CHRONIC KIDNEY DISEASE, WITHOUT LONG-TERM CURRENT USE OF INSULIN (HCC): Primary | ICD-10-CM

## 2023-04-24 DIAGNOSIS — E11.22 TYPE 2 DIABETES MELLITUS WITH STAGE 3A CHRONIC KIDNEY DISEASE, WITHOUT LONG-TERM CURRENT USE OF INSULIN (HCC): Primary | ICD-10-CM

## 2023-04-24 NOTE — TELEPHONE ENCOUNTER
Pt needs insulin pen needles sent in for her Victoza injection  Pt does not need the syringes that were previously sent in

## 2023-05-09 ENCOUNTER — HOSPITAL ENCOUNTER (OUTPATIENT)
Dept: MAMMOGRAPHY | Facility: CLINIC | Age: 68
Discharge: HOME | End: 2023-05-09

## 2023-05-09 VITALS — WEIGHT: 153 LBS | HEIGHT: 64 IN | BODY MASS INDEX: 26.12 KG/M2

## 2023-05-09 DIAGNOSIS — Z12.31 BREAST CANCER SCREENING BY MAMMOGRAM: ICD-10-CM

## 2023-06-05 ENCOUNTER — TELEPHONE (OUTPATIENT)
Dept: NEPHROLOGY | Facility: CLINIC | Age: 68
End: 2023-06-05

## 2023-06-06 ENCOUNTER — APPOINTMENT (OUTPATIENT)
Dept: LAB | Facility: CLINIC | Age: 68
End: 2023-06-06
Payer: MEDICARE

## 2023-06-06 DIAGNOSIS — N18.31 STAGE 3A CHRONIC KIDNEY DISEASE (HCC): ICD-10-CM

## 2023-06-06 LAB — PHOSPHATE SERPL-MCNC: 3.4 MG/DL (ref 2.3–4.1)

## 2023-06-06 PROCEDURE — 36415 COLL VENOUS BLD VENIPUNCTURE: CPT

## 2023-06-06 PROCEDURE — 84100 ASSAY OF PHOSPHORUS: CPT

## 2023-06-06 PROCEDURE — 83521 IG LIGHT CHAINS FREE EACH: CPT

## 2023-06-06 PROCEDURE — 84165 PROTEIN E-PHORESIS SERUM: CPT

## 2023-06-07 ENCOUNTER — TELEPHONE (OUTPATIENT)
Dept: NEPHROLOGY | Facility: CLINIC | Age: 68
End: 2023-06-07

## 2023-06-07 LAB
ALBUMIN SERPL ELPH-MCNC: 3.86 G/DL (ref 3.2–5.1)
ALBUMIN SERPL ELPH-MCNC: 57.6 % (ref 48–70)
ALPHA1 GLOB SERPL ELPH-MCNC: 0.31 G/DL (ref 0.15–0.47)
ALPHA1 GLOB SERPL ELPH-MCNC: 4.6 % (ref 1.8–7)
ALPHA2 GLOB SERPL ELPH-MCNC: 0.9 G/DL (ref 0.42–1.04)
ALPHA2 GLOB SERPL ELPH-MCNC: 13.5 % (ref 5.9–14.9)
BETA GLOB ABNORMAL SERPL ELPH-MCNC: 0.4 G/DL (ref 0.31–0.57)
BETA1 GLOB SERPL ELPH-MCNC: 6 % (ref 4.7–7.7)
BETA2 GLOB SERPL ELPH-MCNC: 4.9 % (ref 3.1–7.9)
BETA2+GAMMA GLOB SERPL ELPH-MCNC: 0.33 G/DL (ref 0.2–0.58)
GAMMA GLOB ABNORMAL SERPL ELPH-MCNC: 0.9 G/DL (ref 0.4–1.66)
GAMMA GLOB SERPL ELPH-MCNC: 13.4 % (ref 6.9–22.3)
IGG/ALB SER: 1.36 {RATIO} (ref 1.1–1.8)
KAPPA LC FREE SER-MCNC: 33.4 MG/L (ref 3.3–19.4)
KAPPA LC FREE/LAMBDA FREE SER: 1.77 {RATIO} (ref 0.26–1.65)
LAMBDA LC FREE SERPL-MCNC: 18.9 MG/L (ref 5.7–26.3)
PROT PATTERN SERPL ELPH-IMP: NORMAL
PROT SERPL-MCNC: 6.7 G/DL (ref 6.4–8.2)

## 2023-06-07 NOTE — TELEPHONE ENCOUNTER
Called and advised pt that creatinine is elevated at 1 4 and advised to drink up to 80 oz of fluids, per Dr Bryan Vaca  Pt understood and was ok with it

## 2023-06-07 NOTE — TELEPHONE ENCOUNTER
----- Message from Cameron Shin MD sent at 6/7/2023 12:09 PM EDT -----  Please call patient and inform that although she is supposed to see me next week, but her creatinine on lab work is elevated at 1 4  Inform her to drink up to 80 oz of fluids daily if already not doing so     Dr Bryan Vaca

## 2023-06-08 PROCEDURE — 84165 PROTEIN E-PHORESIS SERUM: CPT | Performed by: PATHOLOGY

## 2023-06-12 ENCOUNTER — TELEPHONE (OUTPATIENT)
Dept: NEPHROLOGY | Facility: CLINIC | Age: 68
End: 2023-06-12

## 2023-06-13 ENCOUNTER — OFFICE VISIT (OUTPATIENT)
Dept: NEPHROLOGY | Facility: CLINIC | Age: 68
End: 2023-06-13
Payer: MEDICARE

## 2023-06-13 VITALS
TEMPERATURE: 97.8 F | BODY MASS INDEX: 26.26 KG/M2 | RESPIRATION RATE: 16 BRPM | HEART RATE: 65 BPM | WEIGHT: 153.8 LBS | SYSTOLIC BLOOD PRESSURE: 132 MMHG | HEIGHT: 64 IN | OXYGEN SATURATION: 96 % | DIASTOLIC BLOOD PRESSURE: 64 MMHG

## 2023-06-13 DIAGNOSIS — N17.9 AKI (ACUTE KIDNEY INJURY) (HCC): Primary | ICD-10-CM

## 2023-06-13 PROCEDURE — 99215 OFFICE O/P EST HI 40 MIN: CPT | Performed by: INTERNAL MEDICINE

## 2023-06-13 NOTE — PROGRESS NOTES
NEPHROLOGY PROGRESS NOTE    Patient: Alcon Metz               Sex: female          DOA: No admission date for patient encounter  YOB: 1955        Age:  75 y o        6/13/2023        BACKGROUND     75 y o  F with PMH of solitary kidney after undergoing nephrectomy in 2016, DM-2, HTN, CKD IIIa who has been following up in renal office since Jan 2023    SUBJECTIVE     Patient is following up after 5 months  No major complaints  REVIEW OF SYSTEMS     Review of Systems   Constitutional: Negative  HENT: Negative  Eyes: Negative  Respiratory: Negative  Cardiovascular: Negative  Gastrointestinal: Negative  Endocrine: Negative  Genitourinary: Negative  Musculoskeletal: Negative  Skin: Negative  Allergic/Immunologic: Negative  Neurological: Negative  Hematological: Negative  All other systems reviewed and are negative  OBJECTIVE     Current Weight: Weight - Scale: 69 8 kg (153 lb 12 8 oz)  Vitals:    06/13/23 1136   BP: 132/64   Pulse: 65   Resp: 16   Temp: 97 8 °F (36 6 °C)   SpO2: 96%     Body mass index is 26 82 kg/m²    [unfilled]    CURRENT MEDICATIONS       Current Outpatient Medications:   •  allopurinol (ZYLOPRIM) 300 mg tablet, take 1 tablet by mouth once daily, Disp: 30 tablet, Rfl: 0  •  aspirin 81 mg chewable tablet, Chew 81 mg in the morning, Disp: , Rfl:   •  atenolol (TENORMIN) 50 mg tablet, Take 1 tablet (50 mg total) by mouth in the morning, Disp: , Rfl:   •  atorvastatin (LIPITOR) 10 mg tablet, Take 1 tablet (10 mg total) by mouth in the morning, Disp: 90 tablet, Rfl: 5  •  Calcium Carbonate-Vit D-Min (CALCIUM 1200 PO), Take 1,200 mg by mouth daily, Disp: , Rfl:   •  Cetirizine-Pseudoephedrine (ZYRTEC-D ALLERGY & CONGESTION PO), as needed, Disp: , Rfl:   •  cholecalciferol (VITAMIN D3) 25 mcg (1,000 units) tablet, 4,000 Units, Disp: , Rfl:   •  DULoxetine (CYMBALTA) 60 mg delayed release capsule, take 1 capsule by mouth IN THE EARLY MORNING, Disp: 30 capsule, Rfl: 5  •  glucose blood (OneTouch Verio) test strip, Check blood sugars once daily  Please substitute with appropriate alternative as covered by patient's insurance  Dx: E11 65, Disp: 100 each, Rfl: 3  •  Insulin Pen Needle 32G X 4 MM MISC, Use 1 each 3 (three) times a day, Disp: 100 each, Rfl: 3  •  liraglutide (Victoza) injection, Inject 0 1 mL (0 6 mg total) under the skin daily, Disp: 6 mL, Rfl: 5  •  lisinopril (ZESTRIL) 2 5 mg tablet, take 1 tablet by mouth every morning, Disp: 30 tablet, Rfl: 0  •  Omega-3 Fatty Acids (fish oil) 1,000 mg, , Disp: , Rfl:   •  OneTouch Delica Lancets 00Y MISC, Check blood sugars once daily  Please substitute with appropriate alternative as covered by patient's insurance  Dx: E11 65, Disp: 100 each, Rfl: 3      PHYSICAL EXAMINATION     Physical Exam  Constitutional:       Appearance: She is well-developed  HENT:      Head: Normocephalic and atraumatic  Eyes:      Pupils: Pupils are equal, round, and reactive to light  Cardiovascular:      Rate and Rhythm: Normal rate and regular rhythm  Heart sounds: Normal heart sounds  Pulmonary:      Effort: Pulmonary effort is normal    Abdominal:      General: Bowel sounds are normal       Palpations: Abdomen is soft  Musculoskeletal:         General: Normal range of motion  Cervical back: Neck supple  Skin:     General: Skin is warm  Neurological:      Mental Status: She is alert and oriented to person, place, and time  LAB RESULTS     Results from last 6 Months   Lab Units 06/06/23  1148 03/22/23  0930   BUN mg/dL 32* 25   CALCIUM mg/dL 9 9 10 1   CHLORIDE mmol/L 107 108   CO2 mmol/L 29 29   CREATININE mg/dL 1 45* 1 13   HEMATOCRIT % 41 2  --    HEMOGLOBIN g/dL 13 2  --    POTASSIUM mmol/L 5 1 5 2   PHOSPHORUS mg/dL 3 4  --    PLATELETS Thousands/uL 330  --    WBC Thousand/uL 9 97  --              RADIOLOGY RESULTS            IMPRESSION:     1  Status post right nephrectomy  2  Multiple left renal cysts without hydronephrosis or solid lesion detected  Unremarkable urinary bladder with left ureteral jet       ASSESSMENT/PLAN     75 F with PMH of nephrectomy in 2019, HTN, DM-2, proteinuria who presents to renal clinic for f/u    1) CKD IIIB: Baseline Cr has been 1 0-1 1  Recent labs revealing Cr of 1 4 mg/dl and slightly above baseline  Suspect volume depletion being the cause with noticeable pre renal azotemia noted as well as presence of hyaline casts  Renal US had revealed multiple cysts in the solitary kidney w/o any solid mass  Instructed increasing fluids up to 64 oz minimal daily  Hold off on Lisinopril for now  Repeat BMP in 2 months    2) CKD- Mineral bone disorder: Normal Vitamin D levels up to 56  Normal Calcium and Phosphorus levels as well  3) Anemia: Hemoglobin is within target  4) Proteinuria: Noted 130 mg of proteinuria and is near normal      5) Hypertension: On Lisinopril and Atenolol  Noted low normal BP in April 2023  Will d/c Lisinopril  Recheck BP twice daily  Target BP > 715 mm Hg systolic at all times               Claudia Huggins MD  Nephrology  6/13/2023

## 2023-07-05 DIAGNOSIS — E11.22 TYPE 2 DIABETES MELLITUS WITH STAGE 3A CHRONIC KIDNEY DISEASE, WITHOUT LONG-TERM CURRENT USE OF INSULIN (HCC): Primary | ICD-10-CM

## 2023-07-05 DIAGNOSIS — N18.31 TYPE 2 DIABETES MELLITUS WITH STAGE 3A CHRONIC KIDNEY DISEASE, WITHOUT LONG-TERM CURRENT USE OF INSULIN (HCC): Primary | ICD-10-CM

## 2023-07-26 DIAGNOSIS — E78.5 HYPERLIPIDEMIA, UNSPECIFIED HYPERLIPIDEMIA TYPE: ICD-10-CM

## 2023-07-26 DIAGNOSIS — E11.22 TYPE 2 DIABETES MELLITUS WITH STAGE 3A CHRONIC KIDNEY DISEASE, WITHOUT LONG-TERM CURRENT USE OF INSULIN (HCC): ICD-10-CM

## 2023-07-26 DIAGNOSIS — F32.A DEPRESSION, UNSPECIFIED DEPRESSION TYPE: ICD-10-CM

## 2023-07-26 DIAGNOSIS — N18.31 TYPE 2 DIABETES MELLITUS WITH STAGE 3A CHRONIC KIDNEY DISEASE, WITHOUT LONG-TERM CURRENT USE OF INSULIN (HCC): ICD-10-CM

## 2023-07-26 RX ORDER — DULOXETIN HYDROCHLORIDE 60 MG/1
60 CAPSULE, DELAYED RELEASE ORAL DAILY
Qty: 30 CAPSULE | Refills: 5 | Status: SHIPPED | OUTPATIENT
Start: 2023-07-26

## 2023-07-26 RX ORDER — ATORVASTATIN CALCIUM 10 MG/1
10 TABLET, FILM COATED ORAL DAILY
Qty: 90 TABLET | Refills: 5 | Status: SHIPPED | OUTPATIENT
Start: 2023-07-26

## 2023-07-26 RX ORDER — LIRAGLUTIDE 6 MG/ML
0.6 INJECTION SUBCUTANEOUS DAILY
Qty: 6 ML | Refills: 5 | Status: SHIPPED | OUTPATIENT
Start: 2023-07-26

## 2023-07-27 ENCOUNTER — APPOINTMENT (OUTPATIENT)
Dept: LAB | Facility: CLINIC | Age: 68
End: 2023-07-27
Payer: MEDICARE

## 2023-07-27 DIAGNOSIS — N18.31 TYPE 2 DIABETES MELLITUS WITH STAGE 3A CHRONIC KIDNEY DISEASE, WITHOUT LONG-TERM CURRENT USE OF INSULIN (HCC): ICD-10-CM

## 2023-07-27 DIAGNOSIS — E11.22 TYPE 2 DIABETES MELLITUS WITH STAGE 3A CHRONIC KIDNEY DISEASE, WITHOUT LONG-TERM CURRENT USE OF INSULIN (HCC): ICD-10-CM

## 2023-07-27 DIAGNOSIS — E78.5 HYPERLIPIDEMIA, UNSPECIFIED HYPERLIPIDEMIA TYPE: ICD-10-CM

## 2023-07-27 LAB
ALBUMIN SERPL BCP-MCNC: 3.3 G/DL (ref 3.5–5)
ALP SERPL-CCNC: 93 U/L (ref 46–116)
ALT SERPL W P-5'-P-CCNC: 22 U/L (ref 12–78)
ANION GAP SERPL CALCULATED.3IONS-SCNC: 2 MMOL/L
AST SERPL W P-5'-P-CCNC: 18 U/L (ref 5–45)
BILIRUB SERPL-MCNC: 0.37 MG/DL (ref 0.2–1)
BUN SERPL-MCNC: 20 MG/DL (ref 5–25)
CALCIUM ALBUM COR SERPL-MCNC: 10.6 MG/DL (ref 8.3–10.1)
CALCIUM SERPL-MCNC: 10 MG/DL (ref 8.3–10.1)
CHLORIDE SERPL-SCNC: 107 MMOL/L (ref 96–108)
CHOLEST SERPL-MCNC: 190 MG/DL
CO2 SERPL-SCNC: 29 MMOL/L (ref 21–32)
CREAT SERPL-MCNC: 1.16 MG/DL (ref 0.6–1.3)
ERYTHROCYTE [DISTWIDTH] IN BLOOD BY AUTOMATED COUNT: 16.3 % (ref 11.6–15.1)
EST. AVERAGE GLUCOSE BLD GHB EST-MCNC: 120 MG/DL
GFR SERPL CREATININE-BSD FRML MDRD: 48 ML/MIN/1.73SQ M
GLUCOSE P FAST SERPL-MCNC: 116 MG/DL (ref 65–99)
HBA1C MFR BLD: 5.8 %
HCT VFR BLD AUTO: 42.2 % (ref 34.8–46.1)
HDLC SERPL-MCNC: 52 MG/DL
HGB BLD-MCNC: 13.3 G/DL (ref 11.5–15.4)
LDLC SERPL CALC-MCNC: 120 MG/DL (ref 0–100)
MCH RBC QN AUTO: 29.8 PG (ref 26.8–34.3)
MCHC RBC AUTO-ENTMCNC: 31.5 G/DL (ref 31.4–37.4)
MCV RBC AUTO: 95 FL (ref 82–98)
PLATELET # BLD AUTO: 346 THOUSANDS/UL (ref 149–390)
PMV BLD AUTO: 12.1 FL (ref 8.9–12.7)
POTASSIUM SERPL-SCNC: 4.8 MMOL/L (ref 3.5–5.3)
PROT SERPL-MCNC: 7.1 G/DL (ref 6.4–8.4)
RBC # BLD AUTO: 4.46 MILLION/UL (ref 3.81–5.12)
SODIUM SERPL-SCNC: 138 MMOL/L (ref 135–147)
TRIGL SERPL-MCNC: 89 MG/DL
WBC # BLD AUTO: 8.56 THOUSAND/UL (ref 4.31–10.16)

## 2023-07-27 PROCEDURE — 83036 HEMOGLOBIN GLYCOSYLATED A1C: CPT

## 2023-07-27 PROCEDURE — 85027 COMPLETE CBC AUTOMATED: CPT

## 2023-07-27 PROCEDURE — 80053 COMPREHEN METABOLIC PANEL: CPT

## 2023-07-27 PROCEDURE — 36415 COLL VENOUS BLD VENIPUNCTURE: CPT

## 2023-07-27 PROCEDURE — 80061 LIPID PANEL: CPT

## 2023-08-01 ENCOUNTER — TELEPHONE (OUTPATIENT)
Dept: ADMINISTRATIVE | Facility: OTHER | Age: 68
End: 2023-08-01

## 2023-08-01 NOTE — TELEPHONE ENCOUNTER
08/01/23 2:49 PM    Patient contacted (left message) to bring ACP document to next scheduled pcp visit. Thank you.   Sebas Calderon MA  [facility]

## 2023-08-03 ENCOUNTER — OFFICE VISIT (OUTPATIENT)
Dept: FAMILY MEDICINE CLINIC | Facility: CLINIC | Age: 68
End: 2023-08-03
Payer: MEDICARE

## 2023-08-03 VITALS
DIASTOLIC BLOOD PRESSURE: 90 MMHG | SYSTOLIC BLOOD PRESSURE: 116 MMHG | HEIGHT: 64 IN | BODY MASS INDEX: 25.78 KG/M2 | OXYGEN SATURATION: 98 % | TEMPERATURE: 96.1 F | HEART RATE: 54 BPM | WEIGHT: 151 LBS

## 2023-08-03 DIAGNOSIS — E04.1 THYROID NODULE: ICD-10-CM

## 2023-08-03 DIAGNOSIS — I10 PRIMARY HYPERTENSION: Primary | ICD-10-CM

## 2023-08-03 DIAGNOSIS — E78.5 HYPERLIPIDEMIA, UNSPECIFIED HYPERLIPIDEMIA TYPE: ICD-10-CM

## 2023-08-03 DIAGNOSIS — Z90.5 HX OF UNILATERAL NEPHRECTOMY: ICD-10-CM

## 2023-08-03 DIAGNOSIS — E11.22 TYPE 2 DIABETES MELLITUS WITH STAGE 3A CHRONIC KIDNEY DISEASE, WITHOUT LONG-TERM CURRENT USE OF INSULIN (HCC): ICD-10-CM

## 2023-08-03 DIAGNOSIS — F33.9 DEPRESSION, RECURRENT (HCC): ICD-10-CM

## 2023-08-03 DIAGNOSIS — E55.9 VITAMIN D DEFICIENCY: ICD-10-CM

## 2023-08-03 DIAGNOSIS — Z72.0 TOBACCO USE: ICD-10-CM

## 2023-08-03 DIAGNOSIS — I65.23 BILATERAL CAROTID ARTERY STENOSIS: ICD-10-CM

## 2023-08-03 DIAGNOSIS — E79.0 HYPERURICEMIA: ICD-10-CM

## 2023-08-03 DIAGNOSIS — N18.31 TYPE 2 DIABETES MELLITUS WITH STAGE 3A CHRONIC KIDNEY DISEASE, WITHOUT LONG-TERM CURRENT USE OF INSULIN (HCC): ICD-10-CM

## 2023-08-03 PROCEDURE — 99214 OFFICE O/P EST MOD 30 MIN: CPT | Performed by: FAMILY MEDICINE

## 2023-08-03 NOTE — PROGRESS NOTES
Name: Rekha Lujan      : 1955      MRN: [id number]  Encounter Provider: Nate Aaron MD  Encounter Date: 8/3/2023   Encounter department: [address] [place] [address]   Return visit in 4 months with fasting blood prior to visit. 1. Primary hypertension  Assessment & Plan:  Continue atenolol 50 mg daily      2. Hyperlipidemia, unspecified hyperlipidemia type  Assessment & Plan:  Continue Lipitor 10 mg daily    Orders:  -     CBC and differential; Future; Expected date: 2023  -     Comprehensive metabolic panel; Future; Expected date: 2023  -     Lipid Panel with Direct LDL reflex; Future; Expected date: 2023    3. Type 2 diabetes mellitus with stage 3a chronic kidney disease, without long-term current use of insulin (HCC)  Assessment & Plan:  Continue Victoza 0.6 mg daily  Lab Results   Component Value Date    HGBA1C 5.8 (H) 2023       Orders:  -     Hemoglobin A1C; Future; Expected date: 2023    4. Hx of unilateral nephrectomy    5. Depression, recurrent ([address])  Assessment & Plan:  Continue Cymbalta 60 mg daily      6. Vitamin D deficiency    7. Hyperuricemia  Assessment & Plan:  Continue allopurinol 300 mg daily      8. Tobacco use    9. Bilateral carotid artery stenosis  -     VAS carotid complete study; Future; Expected date: 2023    10. Thyroid nodule  -     US thyroid; Future; Expected date: 2023      Depression Screening and Follow-up Plan: Clincally patient does not have depression. No treatment is required. Patient advised to follow-up with PCP for further management. Subjective      Patient comes in for checkup. She continues to follow with nephrology for chronic kidney disease. They have stopped her lisinopril due to low blood pressure. She relates history of thyroid nodule and carotid artery stenosis. Review of Systems   Constitutional: Negative. Respiratory: Negative. Cardiovascular: Negative. Musculoskeletal: Positive for arthralgias. Current Outpatient Medications on File Prior to Visit   Medication Sig   • allopurinol (ZYLOPRIM) 300 mg tablet take 1 tablet by mouth once daily   • aspirin 81 mg chewable tablet Chew 81 mg in the morning   • atenolol (TENORMIN) 50 mg tablet Take 1 tablet (50 mg total) by mouth in the morning   • atorvastatin (LIPITOR) 10 mg tablet Take 1 tablet (10 mg total) by mouth in the morning   • Calcium Carbonate-Vit D-Min (CALCIUM 1200 PO) Take 1,200 mg by mouth daily   • Cetirizine-Pseudoephedrine (ZYRTEC-D ALLERGY & CONGESTION PO) as needed   • cholecalciferol (VITAMIN D3) 25 mcg (1,000 units) tablet 4,000 Units   • DULoxetine (CYMBALTA) 60 mg delayed release capsule Take 1 capsule (60 mg total) by mouth daily   • glucose blood test strip Use 1 each 3 (three) times a day Use as instructed   • Insulin Pen Needle 32G X 4 MM MISC Use 1 each 3 (three) times a day   • liraglutide (Victoza) injection Inject 0.1 mL (0.6 mg total) under the skin daily   • Omega-3 Fatty Acids (fish oil) 1,000 mg    • OneTouch Delica Lancets 81I MISC Check blood sugars once daily. Please substitute with appropriate alternative as covered by patient's insurance. Dx: E11.65   • [DISCONTINUED] lisinopril (ZESTRIL) 2.5 mg tablet take 1 tablet by mouth every morning       Objective     /90 (BP Location: Left arm, Patient Position: Sitting, Cuff Size: Standard)   Pulse (!) 54   Temp (!) 96.1 °F (35.6 °C) (Tympanic)   Ht 5' 3.5" (1.613 m)   Wt 68.5 kg (151 lb)   SpO2 98%   BMI 26.33 kg/m²     Physical Exam  Constitutional:       Appearance: She is well-developed. HENT:      Head: Normocephalic and atraumatic. Eyes:      Pupils: Pupils are equal, round, and reactive to light. Neck:      Thyroid: No thyromegaly. Cardiovascular:      Rate and Rhythm: Normal rate and regular rhythm.       Pulses: no weak pulses          Dorsalis pedis pulses are 1+ on the right side and 1+ on the left side. Posterior tibial pulses are 1+ on the right side and 1+ on the left side. Heart sounds: Normal heart sounds. Pulmonary:      Effort: Pulmonary effort is normal.      Breath sounds: Normal breath sounds. Musculoskeletal:         General: Normal range of motion. Cervical back: Neck supple. Feet:      Right foot:      Skin integrity: No ulcer, skin breakdown, erythema, warmth, callus or dry skin. Left foot:      Skin integrity: No ulcer, skin breakdown, erythema, warmth, callus or dry skin. Lymphadenopathy:      Cervical: No cervical adenopathy. Skin:     General: Skin is warm and dry. Neurological:      Mental Status: She is alert. Psychiatric:         Mood and Affect: Mood normal.         Behavior: Behavior normal.     Diabetic Foot Exam    Patient's shoes and socks removed. Right Foot/Ankle   Right Foot Inspection  Skin Exam: skin normal and skin intact. No dry skin, no warmth, no callus, no erythema, no maceration, no abnormal color, no pre-ulcer, no ulcer and no callus. Toe Exam: ROM and strength within normal limits. Sensory   Vibration: intact  Proprioception: intact  Monofilament testing: intact    Vascular  The right DP pulse is 1+. The right PT pulse is 1+. Left Foot/Ankle  Left Foot Inspection  Skin Exam: skin normal and skin intact. No dry skin, no warmth, no erythema, no maceration, normal color, no pre-ulcer, no ulcer and no callus. Toe Exam: ROM and strength within normal limits. Sensory   Vibration: intact  Proprioception: intact  Monofilament testing: intact    Vascular  The left DP pulse is 1+. The left PT pulse is 1+.      Assign Risk Category  No deformity present  No loss of protective sensation  No weak pulses  Risk: 0    Shamar Caldwell MD

## 2023-08-06 DIAGNOSIS — I10 PRIMARY HYPERTENSION: ICD-10-CM

## 2023-08-06 RX ORDER — ATENOLOL 50 MG/1
100 TABLET ORAL EVERY MORNING
Qty: 30 TABLET | Refills: 0 | Status: SHIPPED | OUTPATIENT
Start: 2023-08-06

## 2023-08-17 ENCOUNTER — TELEPHONE (OUTPATIENT)
Dept: FAMILY MEDICINE CLINIC | Facility: CLINIC | Age: 68
End: 2023-08-17

## 2023-08-17 LAB — COLOGUARD RESULT REPORTABLE: NEGATIVE

## 2023-08-25 ENCOUNTER — HOSPITAL ENCOUNTER (OUTPATIENT)
Dept: VASCULAR ULTRASOUND | Facility: HOSPITAL | Age: 68
Discharge: HOME | End: 2023-08-25
Payer: MEDICARE

## 2023-08-25 DIAGNOSIS — I65.23 BILATERAL CAROTID ARTERY STENOSIS: ICD-10-CM

## 2023-08-25 PROCEDURE — 93880 EXTRACRANIAL BILAT STUDY: CPT

## 2023-08-25 PROCEDURE — 93880 EXTRACRANIAL BILAT STUDY: CPT | Performed by: INTERNAL MEDICINE

## 2023-08-29 ENCOUNTER — HOSPITAL ENCOUNTER (OUTPATIENT)
Dept: ULTRASOUND IMAGING | Facility: HOSPITAL | Age: 68
Discharge: HOME | End: 2023-08-29
Attending: FAMILY MEDICINE
Payer: MEDICARE

## 2023-08-29 DIAGNOSIS — E04.1 THYROID NODULE: ICD-10-CM

## 2023-08-29 PROCEDURE — 76536 US EXAM OF HEAD AND NECK: CPT

## 2023-09-07 ENCOUNTER — TELEPHONE (OUTPATIENT)
Dept: FAMILY MEDICINE CLINIC | Facility: CLINIC | Age: 68
End: 2023-09-07

## 2023-09-07 NOTE — TELEPHONE ENCOUNTER
T/c from radiology -- called to report significant findings on pts thyroid ultrasound done on 8/29.     Forwarded to clinical team, as provider will be out of office until 9/11

## 2023-09-08 DIAGNOSIS — E04.1 THYROID NODULE: Primary | ICD-10-CM

## 2023-09-08 NOTE — TELEPHONE ENCOUNTER
- Central Scheduling called for recent order clarification for thyroid bx - it was put under labs order - not as a  thyroid biopsy with molecular testing. Please assists / review.        Pleased advise

## 2023-09-13 DIAGNOSIS — I10 PRIMARY HYPERTENSION: ICD-10-CM

## 2023-09-13 RX ORDER — ATENOLOL 50 MG/1
100 TABLET ORAL EVERY MORNING
Qty: 30 TABLET | Refills: 0 | OUTPATIENT
Start: 2023-09-13

## 2023-09-13 RX ORDER — ATENOLOL 50 MG/1
100 TABLET ORAL EVERY MORNING
Qty: 30 TABLET | Refills: 0 | Status: SHIPPED | OUTPATIENT
Start: 2023-09-13

## 2023-09-21 ENCOUNTER — APPOINTMENT (OUTPATIENT)
Dept: LAB | Facility: CLINIC | Age: 68
End: 2023-09-21
Payer: MEDICARE

## 2023-09-21 ENCOUNTER — HOSPITAL ENCOUNTER (OUTPATIENT)
Dept: ULTRASOUND IMAGING | Facility: HOSPITAL | Age: 68
End: 2023-09-21
Payer: MEDICARE

## 2023-09-21 DIAGNOSIS — N18.31 STAGE 3A CHRONIC KIDNEY DISEASE (HCC): ICD-10-CM

## 2023-09-21 DIAGNOSIS — N17.9 AKI (ACUTE KIDNEY INJURY) (HCC): ICD-10-CM

## 2023-09-21 LAB — PHOSPHATE SERPL-MCNC: 3.5 MG/DL (ref 2.3–4.1)

## 2023-09-21 PROCEDURE — 84100 ASSAY OF PHOSPHORUS: CPT

## 2023-09-25 ENCOUNTER — TELEPHONE (OUTPATIENT)
Dept: NEPHROLOGY | Facility: CLINIC | Age: 68
End: 2023-09-25

## 2023-09-25 NOTE — TELEPHONE ENCOUNTER
Good Afternoon    Dr. Sherrell Bradley    Patient of yours is calling the office because she had blood work done. Patient stated that the day before the blood work she had a flu shot and Prevnar 20 shot .  Patient wanted to let you know just in case the bood work she took after that has an abnormality    If need please contact patient at [phone]    Thank you

## 2023-09-26 NOTE — TELEPHONE ENCOUNTER
Called and advised pt that all of her labs are within acceptable range. Pt understood and was ok with that.

## 2023-09-27 ENCOUNTER — TELEPHONE (OUTPATIENT)
Dept: FAMILY MEDICINE CLINIC | Facility: CLINIC | Age: 68
End: 2023-09-27

## 2023-09-27 ENCOUNTER — TELEPHONE (OUTPATIENT)
Dept: NEPHROLOGY | Facility: CLINIC | Age: 68
End: 2023-09-27

## 2023-09-27 NOTE — TELEPHONE ENCOUNTER
Good Afternoon    Dr. Kilgore      Patient called the office requesting to speak with you about her Blood pressure.  Patient stated her blood pressure is  110/65 Does the patient takes her blood pressure  Medication when is low    Please Advised!!

## 2023-09-27 NOTE — TELEPHONE ENCOUNTER
Pt left message for office -- is requesting a referral from Dr Tip Casas for a new hand surgeon. Reports her current surgeon is supposed to see her every month and she has not been able to get in with him for the last 4. Please call with referral info.

## 2023-09-28 DIAGNOSIS — M19.041 ARTHRITIS OF BOTH HANDS: Primary | ICD-10-CM

## 2023-09-28 DIAGNOSIS — M19.042 ARTHRITIS OF BOTH HANDS: Primary | ICD-10-CM

## 2023-10-05 ENCOUNTER — TELEPHONE (OUTPATIENT)
Dept: FAMILY MEDICINE CLINIC | Facility: CLINIC | Age: 68
End: 2023-10-05

## 2023-10-05 NOTE — TELEPHONE ENCOUNTER
Pt left message for office - requesting Dr Kelesy Thorpe' advisement -- reports her BP has been low at the time of day she is supposed to be taking her blood pressure med. Today was 93/74, pulse was 68. Is concerned about taking med when her bp is already that low. Would like Dr Be Taoism advice on issue.

## 2023-10-18 DIAGNOSIS — I10 PRIMARY HYPERTENSION: ICD-10-CM

## 2023-10-18 RX ORDER — ATENOLOL 50 MG/1
100 TABLET ORAL EVERY MORNING
Qty: 30 TABLET | Refills: 0 | Status: SHIPPED | OUTPATIENT
Start: 2023-10-18

## 2023-11-12 DIAGNOSIS — I10 PRIMARY HYPERTENSION: ICD-10-CM

## 2023-11-12 RX ORDER — ATENOLOL 50 MG/1
100 TABLET ORAL EVERY MORNING
Qty: 30 TABLET | Refills: 0 | Status: SHIPPED | OUTPATIENT
Start: 2023-11-12

## 2023-11-28 DIAGNOSIS — I10 PRIMARY HYPERTENSION: ICD-10-CM

## 2023-11-28 RX ORDER — ATENOLOL 50 MG/1
100 TABLET ORAL EVERY MORNING
Qty: 30 TABLET | Refills: 0 | Status: SHIPPED | OUTPATIENT
Start: 2023-11-28 | End: 2023-12-07 | Stop reason: SDUPTHER

## 2023-12-04 ENCOUNTER — APPOINTMENT (OUTPATIENT)
Dept: LAB | Facility: CLINIC | Age: 68
End: 2023-12-04
Payer: MEDICARE

## 2023-12-04 DIAGNOSIS — N18.31 TYPE 2 DIABETES MELLITUS WITH STAGE 3A CHRONIC KIDNEY DISEASE, WITHOUT LONG-TERM CURRENT USE OF INSULIN (HCC): ICD-10-CM

## 2023-12-04 DIAGNOSIS — E11.22 TYPE 2 DIABETES MELLITUS WITH STAGE 3A CHRONIC KIDNEY DISEASE, WITHOUT LONG-TERM CURRENT USE OF INSULIN (HCC): ICD-10-CM

## 2023-12-04 DIAGNOSIS — E78.5 HYPERLIPIDEMIA, UNSPECIFIED HYPERLIPIDEMIA TYPE: ICD-10-CM

## 2023-12-04 LAB
ALBUMIN SERPL BCP-MCNC: 3.9 G/DL (ref 3.5–5)
ALP SERPL-CCNC: 75 U/L (ref 34–104)
ALT SERPL W P-5'-P-CCNC: 13 U/L (ref 7–52)
ANION GAP SERPL CALCULATED.3IONS-SCNC: 5 MMOL/L
AST SERPL W P-5'-P-CCNC: 15 U/L (ref 13–39)
BASOPHILS # BLD AUTO: 0.04 THOUSANDS/ÂΜL (ref 0–0.1)
BASOPHILS NFR BLD AUTO: 0 % (ref 0–1)
BILIRUB SERPL-MCNC: 0.38 MG/DL (ref 0.2–1)
BUN SERPL-MCNC: 24 MG/DL (ref 5–25)
CALCIUM SERPL-MCNC: 10.2 MG/DL (ref 8.4–10.2)
CHLORIDE SERPL-SCNC: 105 MMOL/L (ref 96–108)
CHOLEST SERPL-MCNC: 180 MG/DL
CO2 SERPL-SCNC: 31 MMOL/L (ref 21–32)
CREAT SERPL-MCNC: 1.23 MG/DL (ref 0.6–1.3)
EOSINOPHIL # BLD AUTO: 0.34 THOUSAND/ÂΜL (ref 0–0.61)
EOSINOPHIL NFR BLD AUTO: 3 % (ref 0–6)
ERYTHROCYTE [DISTWIDTH] IN BLOOD BY AUTOMATED COUNT: 16.5 % (ref 11.6–15.1)
EST. AVERAGE GLUCOSE BLD GHB EST-MCNC: 123 MG/DL
GFR SERPL CREATININE-BSD FRML MDRD: 44 ML/MIN/1.73SQ M
GLUCOSE P FAST SERPL-MCNC: 100 MG/DL (ref 65–99)
HBA1C MFR BLD: 5.9 %
HCT VFR BLD AUTO: 42.4 % (ref 34.8–46.1)
HDLC SERPL-MCNC: 57 MG/DL
HGB BLD-MCNC: 13.6 G/DL (ref 11.5–15.4)
IMM GRANULOCYTES # BLD AUTO: 0.04 THOUSAND/UL (ref 0–0.2)
IMM GRANULOCYTES NFR BLD AUTO: 0 % (ref 0–2)
LDLC SERPL CALC-MCNC: 108 MG/DL (ref 0–100)
LYMPHOCYTES # BLD AUTO: 2.46 THOUSANDS/ÂΜL (ref 0.6–4.47)
LYMPHOCYTES NFR BLD AUTO: 23 % (ref 14–44)
MCH RBC QN AUTO: 29.7 PG (ref 26.8–34.3)
MCHC RBC AUTO-ENTMCNC: 32.1 G/DL (ref 31.4–37.4)
MCV RBC AUTO: 93 FL (ref 82–98)
MONOCYTES # BLD AUTO: 0.86 THOUSAND/ÂΜL (ref 0.17–1.22)
MONOCYTES NFR BLD AUTO: 8 % (ref 4–12)
NEUTROPHILS # BLD AUTO: 6.78 THOUSANDS/ÂΜL (ref 1.85–7.62)
NEUTS SEG NFR BLD AUTO: 66 % (ref 43–75)
NRBC BLD AUTO-RTO: 0 /100 WBCS
PLATELET # BLD AUTO: 362 THOUSANDS/UL (ref 149–390)
PMV BLD AUTO: 12.3 FL (ref 8.9–12.7)
POTASSIUM SERPL-SCNC: 5.1 MMOL/L (ref 3.5–5.3)
PROT SERPL-MCNC: 6.5 G/DL (ref 6.4–8.4)
RBC # BLD AUTO: 4.58 MILLION/UL (ref 3.81–5.12)
SODIUM SERPL-SCNC: 141 MMOL/L (ref 135–147)
TRIGL SERPL-MCNC: 73 MG/DL
WBC # BLD AUTO: 10.52 THOUSAND/UL (ref 4.31–10.16)

## 2023-12-04 PROCEDURE — 80061 LIPID PANEL: CPT

## 2023-12-04 PROCEDURE — 85025 COMPLETE CBC W/AUTO DIFF WBC: CPT

## 2023-12-04 PROCEDURE — 36415 COLL VENOUS BLD VENIPUNCTURE: CPT

## 2023-12-04 PROCEDURE — 83036 HEMOGLOBIN GLYCOSYLATED A1C: CPT

## 2023-12-04 PROCEDURE — 80053 COMPREHEN METABOLIC PANEL: CPT

## 2023-12-06 ENCOUNTER — TELEPHONE (OUTPATIENT)
Dept: ADMINISTRATIVE | Facility: OTHER | Age: 68
End: 2023-12-06

## 2023-12-06 NOTE — TELEPHONE ENCOUNTER
12/06/23 9:21 AM    Patient contacted (left message) to bring Advance Directive, POLST, or Living Will document to next scheduled pcp visit. Thank you.   Trey Salas [facility]

## 2023-12-07 ENCOUNTER — OFFICE VISIT (OUTPATIENT)
Dept: FAMILY MEDICINE CLINIC | Facility: CLINIC | Age: 68
End: 2023-12-07
Payer: MEDICARE

## 2023-12-07 VITALS
HEART RATE: 87 BPM | DIASTOLIC BLOOD PRESSURE: 76 MMHG | OXYGEN SATURATION: 98 % | HEIGHT: 64 IN | SYSTOLIC BLOOD PRESSURE: 132 MMHG | TEMPERATURE: 98.2 F | WEIGHT: 150 LBS | BODY MASS INDEX: 25.61 KG/M2

## 2023-12-07 DIAGNOSIS — N18.32 TYPE 2 DIABETES MELLITUS WITH STAGE 3B CHRONIC KIDNEY DISEASE, WITHOUT LONG-TERM CURRENT USE OF INSULIN (HCC): ICD-10-CM

## 2023-12-07 DIAGNOSIS — Z90.5 HX OF UNILATERAL NEPHRECTOMY: ICD-10-CM

## 2023-12-07 DIAGNOSIS — I10 PRIMARY HYPERTENSION: Primary | ICD-10-CM

## 2023-12-07 DIAGNOSIS — J30.9 ALLERGIC RHINITIS, UNSPECIFIED SEASONALITY, UNSPECIFIED TRIGGER: ICD-10-CM

## 2023-12-07 DIAGNOSIS — E79.0 HYPERURICEMIA: ICD-10-CM

## 2023-12-07 DIAGNOSIS — E55.9 VITAMIN D DEFICIENCY: ICD-10-CM

## 2023-12-07 DIAGNOSIS — E78.5 HYPERLIPIDEMIA, UNSPECIFIED HYPERLIPIDEMIA TYPE: ICD-10-CM

## 2023-12-07 DIAGNOSIS — F33.9 DEPRESSION, RECURRENT (HCC): ICD-10-CM

## 2023-12-07 DIAGNOSIS — M54.2 CERVICALGIA: ICD-10-CM

## 2023-12-07 DIAGNOSIS — M19.90 ARTHRITIS: ICD-10-CM

## 2023-12-07 DIAGNOSIS — E11.22 TYPE 2 DIABETES MELLITUS WITH STAGE 3B CHRONIC KIDNEY DISEASE, WITHOUT LONG-TERM CURRENT USE OF INSULIN (HCC): ICD-10-CM

## 2023-12-07 PROBLEM — N18.31 TYPE 2 DIABETES MELLITUS WITH STAGE 3A CHRONIC KIDNEY DISEASE, WITHOUT LONG-TERM CURRENT USE OF INSULIN (HCC): Status: RESOLVED | Noted: 2022-12-01 | Resolved: 2023-12-07

## 2023-12-07 PROCEDURE — 99214 OFFICE O/P EST MOD 30 MIN: CPT | Performed by: FAMILY MEDICINE

## 2023-12-07 RX ORDER — ATENOLOL 50 MG/1
50 TABLET ORAL EVERY MORNING
Qty: 90 TABLET | Refills: 3 | Status: SHIPPED | OUTPATIENT
Start: 2023-12-07

## 2023-12-07 RX ORDER — ALLOPURINOL 300 MG/1
150 TABLET ORAL DAILY
Qty: 90 TABLET | Refills: 3
Start: 2023-12-07

## 2023-12-07 RX ORDER — CYCLOBENZAPRINE HCL 10 MG
10 TABLET ORAL
Qty: 90 TABLET | Refills: 3 | Status: SHIPPED | OUTPATIENT
Start: 2023-12-07

## 2023-12-07 RX ORDER — FLUTICASONE PROPIONATE 50 MCG
2 SPRAY, SUSPENSION (ML) NASAL DAILY
Qty: 51 ML | Refills: 3 | Status: SHIPPED | OUTPATIENT
Start: 2023-12-07

## 2023-12-07 NOTE — PROGRESS NOTES
Name: Johnnie Roberts      : 1955      MRN: [id number]  Encounter Provider: Vergia Claude, MD  Encounter Date: 2023   Encounter department: [address] [place] [address]   Return visit in 4 months with fasting blood prior to visit  1. Primary hypertension  Assessment & Plan:  She will continue atenolol 25 mg daily. If she gets relief of her neck pain and headaches from Flexeril her blood pressure does not come down she will increase atenolol to 25 mg twice daily. Orders:  -     atenolol (TENORMIN) 50 mg tablet; Take 1 tablet (50 mg total) by mouth every morning    2. Hyperlipidemia, unspecified hyperlipidemia type  Assessment & Plan:  Continue Lipitor 10 mg daily      3. Hyperuricemia  -     allopurinol (ZYLOPRIM) 300 mg tablet; Take 0.5 tablets (150 mg total) by mouth daily  -     Uric acid; Future  -     Comprehensive metabolic panel; Future; Expected date: 2024  -     CBC and differential; Future; Expected date: 2024  -     Lipid Panel with Direct LDL reflex; Future; Expected date: 2024    4. Vitamin D deficiency    5. Arthritis    6. Type 2 diabetes mellitus with stage 3b chronic kidney disease, without long-term current use of insulin ([facility])  Assessment & Plan:  Continue Victoza 0.6 mg subcutaneous daily  Lab Results   Component Value Date    HGBA1C 5.9 (H) 2023       Orders:  -     Hemoglobin A1C; Future; Expected date: 2024    7. Hx of unilateral nephrectomy    8. Cervicalgia  -     cyclobenzaprine (FLEXERIL) 10 mg tablet; Take 1 tablet (10 mg total) by mouth daily at bedtime    9. Allergic rhinitis, unspecified seasonality, unspecified trigger  -     fluticasone (FLONASE) 50 mcg/act nasal spray; 2 sprays into each nostril daily    10. Depression, recurrent ([address])  Assessment & Plan:  Continue Cymbalta 60 mg daily        BMI Counseling: Body mass index is 26.15 kg/m².  The BMI is above normal. Nutrition recommendations include decreasing portion sizes and moderation in carbohydrate intake. Exercise recommendations include exercising 3-5 times per week. No pharmacotherapy was ordered. Rationale for BMI follow-up plan is due to patient being overweight or obese. Subjective      Patient comes in for checkup. She complains of neck pain and headaches particularly in the morning. She also notes that her blood pressure is elevated in the morning. Nephrology has changed her blood pressure medication . She is only taking atenolol 25 mg once daily. She also complains of allergies and problems with sinus congestion mostly at night. Review of Systems   Constitutional: Negative. HENT:  Positive for congestion. Respiratory: Negative. Cardiovascular: Negative. Musculoskeletal:  Positive for neck pain and neck stiffness. Neurological:  Positive for headaches. Current Outpatient Medications on File Prior to Visit   Medication Sig    aspirin 81 mg chewable tablet Chew 81 mg in the morning    atorvastatin (LIPITOR) 10 mg tablet Take 1 tablet (10 mg total) by mouth in the morning    Calcium Carbonate-Vit D-Min (CALCIUM 1200 PO) Take 1,200 mg by mouth daily    Cetirizine-Pseudoephedrine (ZYRTEC-D ALLERGY & CONGESTION PO) as needed    cholecalciferol (VITAMIN D3) 25 mcg (1,000 units) tablet 4,000 Units    DULoxetine (CYMBALTA) 60 mg delayed release capsule Take 1 capsule (60 mg total) by mouth daily    glucose blood test strip Use 1 each 3 (three) times a day Use as instructed    Insulin Pen Needle 32G X 4 MM MISC Use 1 each 3 (three) times a day    liraglutide (Victoza) injection Inject 0.1 mL (0.6 mg total) under the skin daily    Omega-3 Fatty Acids (fish oil) 1,000 mg     OneTouch Delica Lancets 89E MISC Check blood sugars once daily. Please substitute with appropriate alternative as covered by patient's insurance.  Dx: E11.65    [DISCONTINUED] allopurinol (ZYLOPRIM) 300 mg tablet take 1 tablet by mouth once daily (Patient taking differently: Take 150 mg by mouth daily)    [DISCONTINUED] atenolol (TENORMIN) 50 mg tablet TAKE 2 TABLETS BY MOUTH EVERY MORNING (Patient taking differently: Take 25 mg by mouth every morning)       Objective     /76   Pulse 87   Temp 98.2 °F (36.8 °C)   Ht 5' 3.5" (1.613 m)   Wt 68 kg (150 lb)   SpO2 98%   BMI 26.15 kg/m²     Physical Exam  Constitutional:       Appearance: Normal appearance. She is well-developed. HENT:      Head: Normocephalic and atraumatic. Right Ear: External ear normal.      Left Ear: External ear normal.   Eyes:      Pupils: Pupils are equal, round, and reactive to light. Neck:      Thyroid: No thyromegaly. Cardiovascular:      Rate and Rhythm: Normal rate and regular rhythm. Heart sounds: Normal heart sounds. Pulmonary:      Effort: Pulmonary effort is normal.      Breath sounds: Normal breath sounds. Abdominal:      Palpations: Abdomen is soft. Musculoskeletal:      Cervical back: Neck supple. Comments: Tender cervical paraspinal muscles   Lymphadenopathy:      Cervical: No cervical adenopathy. Skin:     General: Skin is warm and dry. Neurological:      Mental Status: She is alert.    Psychiatric:         Mood and Affect: Mood normal.         Behavior: Behavior normal.       Theresa Davey MD

## 2023-12-07 NOTE — ASSESSMENT & PLAN NOTE
She will continue atenolol 25 mg daily. If she gets relief of her neck pain and headaches from Flexeril her blood pressure does not come down she will increase atenolol to 25 mg twice daily.

## 2023-12-07 NOTE — ASSESSMENT & PLAN NOTE
Continue Victoza 0.6 mg subcutaneous daily  Lab Results   Component Value Date    HGBA1C 5.9 (H) 12/04/2023

## 2023-12-11 ENCOUNTER — TELEPHONE (OUTPATIENT)
Dept: NEPHROLOGY | Facility: CLINIC | Age: 68
End: 2023-12-11

## 2023-12-11 NOTE — TELEPHONE ENCOUNTER
Good Afternoon    Dr. Angela Bishop    Patient of yours called the office in regards of her wrist surgery she will be having 01/17/2023. As per patient she stated she would like to know if you need to order any testing before her surgery on 01/17/2023.      Please Advised!!    Thank you

## 2024-01-03 ENCOUNTER — TELEPHONE (OUTPATIENT)
Dept: FAMILY MEDICINE CLINIC | Facility: CLINIC | Age: 69
End: 2024-01-03

## 2024-01-03 ENCOUNTER — TELEPHONE (OUTPATIENT)
Dept: NEPHROLOGY | Facility: CLINIC | Age: 69
End: 2024-01-03

## 2024-01-04 ENCOUNTER — APPOINTMENT (OUTPATIENT)
Dept: LAB | Facility: CLINIC | Age: 69
End: 2024-01-04
Payer: MEDICARE

## 2024-01-04 DIAGNOSIS — N18.31 STAGE 3A CHRONIC KIDNEY DISEASE (HCC): ICD-10-CM

## 2024-01-04 LAB — PHOSPHATE SERPL-MCNC: 3.6 MG/DL (ref 2.3–4.1)

## 2024-01-04 PROCEDURE — 84100 ASSAY OF PHOSPHORUS: CPT

## 2024-01-04 NOTE — TELEPHONE ENCOUNTER
Called and notified pt of Dr Milligan' advisement  
Pt lm for office -- called to report to Dr Milligan that she fell over the weekend and hit her head.  It is a little sore, but she does not have a headache or any other s/s.  Reports she also hit her head on a cabinet in October.     Pt has wrist sx coming up and would like to know if Dr Milligan feels she should have an xray or testing before the procedure, considering the recent head injuries.    Please advise.   
auscultated w/ stethoscope

## 2024-01-08 ENCOUNTER — TELEPHONE (OUTPATIENT)
Dept: NEPHROLOGY | Facility: CLINIC | Age: 69
End: 2024-01-08

## 2024-01-09 ENCOUNTER — OFFICE VISIT (OUTPATIENT)
Dept: NEPHROLOGY | Facility: CLINIC | Age: 69
End: 2024-01-09
Payer: MEDICARE

## 2024-01-09 VITALS
WEIGHT: 148 LBS | BODY MASS INDEX: 25.27 KG/M2 | TEMPERATURE: 96.9 F | RESPIRATION RATE: 16 BRPM | OXYGEN SATURATION: 95 % | HEIGHT: 64 IN | SYSTOLIC BLOOD PRESSURE: 144 MMHG | HEART RATE: 70 BPM | DIASTOLIC BLOOD PRESSURE: 84 MMHG

## 2024-01-09 DIAGNOSIS — N18.31 STAGE 3A CHRONIC KIDNEY DISEASE (HCC): Primary | ICD-10-CM

## 2024-01-09 DIAGNOSIS — N18.32 TYPE 2 DIABETES MELLITUS WITH STAGE 3B CHRONIC KIDNEY DISEASE, WITHOUT LONG-TERM CURRENT USE OF INSULIN (HCC): ICD-10-CM

## 2024-01-09 DIAGNOSIS — E11.22 TYPE 2 DIABETES MELLITUS WITH STAGE 3B CHRONIC KIDNEY DISEASE, WITHOUT LONG-TERM CURRENT USE OF INSULIN (HCC): ICD-10-CM

## 2024-01-09 PROCEDURE — 99214 OFFICE O/P EST MOD 30 MIN: CPT | Performed by: INTERNAL MEDICINE

## 2024-01-09 NOTE — PROGRESS NOTES
NEPHROLOGY PROGRESS NOTE    Patient: Sam Peacock               Sex: female          DOA: No admission date for patient encounter.   YOB: 1955        Age:  68 y.o.       1/9/2024        BACKGROUND     67 y.o. F with PMH of solitary kidney after undergoing nephrectomy in 2016, DM-2, HTN, CKD IIIa who has been following up in renal office since Jan 2023    SUBJECTIVE     Patient is following up after 6 months.  She was diagnosed with osteopenia and has been taking calcium vitamin D supplements.  She will be undergoing right wrist surgery at [facility] in the near future.    REVIEW OF SYSTEMS     Review of Systems   Constitutional: Negative.    HENT: Negative.     Eyes: Negative.    Respiratory: Negative.     Cardiovascular: Negative.    Gastrointestinal: Negative.    Endocrine: Negative.    Genitourinary: Negative.    Musculoskeletal:  Positive for arthralgias.   Skin: Negative.    Allergic/Immunologic: Negative.    Neurological: Negative.    Hematological: Negative.    All other systems reviewed and are negative.      OBJECTIVE     Current Weight: Weight - Scale: 67.1 kg (148 lb)  Vitals:    01/09/24 0954   BP: 144/84   Pulse: 70   Resp: 16   Temp: (!) 96.9 °F (36.1 °C)   SpO2: 95%     Body mass index is 25.81 kg/m².      CURRENT MEDICATIONS       Current Outpatient Medications:     allopurinol (ZYLOPRIM) 300 mg tablet, Take 0.5 tablets (150 mg total) by mouth daily, Disp: 90 tablet, Rfl: 3    aspirin 81 mg chewable tablet, Chew 81 mg in the morning, Disp: , Rfl:     atenolol (TENORMIN) 50 mg tablet, Take 1 tablet (50 mg total) by mouth every morning, Disp: 90 tablet, Rfl: 3    atorvastatin (LIPITOR) 10 mg tablet, Take 1 tablet (10 mg total) by mouth in the morning, Disp: 90 tablet, Rfl: 5    Calcium Carbonate-Vit D-Min (CALCIUM 1200 PO), Take 1,200 mg by mouth daily, Disp: , Rfl:     Cetirizine-Pseudoephedrine (ZYRTEC-D ALLERGY & CONGESTION PO), as needed, Disp: , Rfl:      cholecalciferol (VITAMIN D3) 25 mcg (1,000 units) tablet, 2,000 Units daily, Disp: , Rfl:     cyclobenzaprine (FLEXERIL) 10 mg tablet, Take 1 tablet (10 mg total) by mouth daily at bedtime, Disp: 90 tablet, Rfl: 3    DULoxetine (CYMBALTA) 60 mg delayed release capsule, Take 1 capsule (60 mg total) by mouth daily, Disp: 30 capsule, Rfl: 5    fluticasone (FLONASE) 50 mcg/act nasal spray, 2 sprays into each nostril daily, Disp: 51 mL, Rfl: 3    glucose blood test strip, Use 1 each 3 (three) times a day Use as instructed, Disp: 100 each, Rfl: 3    Insulin Pen Needle 32G X 4 MM MISC, Use 1 each 3 (three) times a day, Disp: 100 each, Rfl: 3    liraglutide (Victoza) injection, Inject 0.1 mL (0.6 mg total) under the skin daily, Disp: 6 mL, Rfl: 5    Omega-3 Fatty Acids (fish oil) 1,000 mg, , Disp: , Rfl:     OneTouch Delica Lancets 33G MISC, Check blood sugars once daily. Please substitute with appropriate alternative as covered by patient's insurance. Dx: E11.65, Disp: 100 each, Rfl: 3      PHYSICAL EXAMINATION     Physical Exam  Constitutional:       Appearance: She is well-developed.   HENT:      Head: Normocephalic and atraumatic.   Eyes:      Pupils: Pupils are equal, round, and reactive to light.   Cardiovascular:      Rate and Rhythm: Normal rate and regular rhythm.      Heart sounds: Normal heart sounds.   Pulmonary:      Effort: Pulmonary effort is normal.   Abdominal:      General: Bowel sounds are normal.      Palpations: Abdomen is soft.   Musculoskeletal:         General: Normal range of motion.      Cervical back: Neck supple.   Skin:     General: Skin is warm.   Neurological:      Mental Status: She is alert and oriented to person, place, and time.           LAB RESULTS     Results from last 6 Months   Lab Units 01/04/24  1403 12/04/23  0812 09/21/23  1259   WBC Thousand/uL 9.78 10.52* 9.15   HEMOGLOBIN g/dL 13.0 13.6 13.2   HEMATOCRIT % 42.0 42.4 41.0   PLATELETS Thousands/uL 361 362 328   POTASSIUM mmol/L  5.1 5.1 4.9   CHLORIDE mmol/L 103 105 102   CO2 mmol/L 32 31 31   BUN mg/dL 21 24 24   CREATININE mg/dL 1.17 1.23 1.24   CALCIUM mg/dL 10.9* 10.2 10.1   PHOSPHORUS mg/dL 3.6  --  3.5             RADIOLOGY RESULTS            IMPRESSION:     1. Status post right nephrectomy  2. Multiple left renal cysts without hydronephrosis or solid lesion detected. Unremarkable urinary bladder with left ureteral jet       ASSESSMENT/PLAN     67 F with PMH of nephrectomy in 2019, HTN, DM-2, proteinuria who presents to renal clinic for f/u    1) CKD IIIB: Baseline Cr has been 1.0-1.1  Recent labs revealing Cr of 1.1 mg/dl eGFR 47 and within baseline.    2) CKD- Mineral bone disorder: PTH intact, 25-hydroxy vitamin D levels are normal.  Calcium level is 10.9 and above target.  Patient is on calcium vitamin D supplements given presence of osteopenia.  Recommended patient to drink at least 64 ounces of water to ensure calcium levels remains less than 11.    3) Anemia: Hemoglobin is within target.     4) Proteinuria: Noted 110 mg of proteinuria and is near normal.  Restart patient on lisinopril 2.5 mg once daily    5) Hypertension: Blood pressure has been elevated above target in the range of 150 to 160 mmHg systolic.  Remains on atenolol.  Restart patient on lisinopril 2.5 mg once daily    6.  Arthritis: Patient has arthritis of right wrist and will be undergoing surgery.  She is cleared from my and to undergo the recommended orthopedic surgery.  She is asked to hold off on lisinopril on the day of surgery.            Catalina Banks MD  Nephrology  1/9/2024

## 2024-01-09 NOTE — LETTER
January 9, 2024     Liang Milligan MD  [address]   [address]  [facility] [address]    Patient: Sam Peacock   YOB: 1955   Date of Visit: 1/9/2024       Dear Dr. Milligan:    Thank you for referring Sam Peacock to me for evaluation. Below are my notes for this consultation.    If you have questions, please do not hesitate to call me. I look forward to following your patient along with you.         Sincerely,        Catalina Banks MD        CC: MD Catalina Rodriguez MD  1/9/2024 10:33 AM  Incomplete  NEPHROLOGY PROGRESS NOTE    Patient: Sam Peacock               Sex: female          DOA: No admission date for patient encounter.   YOB: 1955        Age:  68 y.o.       1/9/2024        BACKGROUND     67 y.o. F with PMH of solitary kidney after undergoing nephrectomy in 2016, DM-2, HTN, CKD IIIa who has been following up in renal office since Jan 2023    SUBJECTIVE     Patient is following up after 6 months.  She was diagnosed with osteopenia and has been taking calcium vitamin D supplements.  She will be undergoing right wrist surgery at [facility] in the near future.    REVIEW OF SYSTEMS     Review of Systems   Constitutional: Negative.    HENT: Negative.     Eyes: Negative.    Respiratory: Negative.     Cardiovascular: Negative.    Gastrointestinal: Negative.    Endocrine: Negative.    Genitourinary: Negative.    Musculoskeletal:  Positive for arthralgias.   Skin: Negative.    Allergic/Immunologic: Negative.    Neurological: Negative.    Hematological: Negative.    All other systems reviewed and are negative.      OBJECTIVE     Current Weight: Weight - Scale: 67.1 kg (148 lb)  Vitals:    01/09/24 0954   BP: 144/84   Pulse: 70   Resp: 16   Temp: (!) 96.9 °F (36.1 °C)   SpO2: 95%     Body mass index is 25.81 kg/m².      CURRENT MEDICATIONS       Current Outpatient Medications:   •  allopurinol (ZYLOPRIM) 300 mg tablet, Take 0.5 tablets (150 mg total) by mouth  daily, Disp: 90 tablet, Rfl: 3  •  aspirin 81 mg chewable tablet, Chew 81 mg in the morning, Disp: , Rfl:   •  atenolol (TENORMIN) 50 mg tablet, Take 1 tablet (50 mg total) by mouth every morning, Disp: 90 tablet, Rfl: 3  •  atorvastatin (LIPITOR) 10 mg tablet, Take 1 tablet (10 mg total) by mouth in the morning, Disp: 90 tablet, Rfl: 5  •  Calcium Carbonate-Vit D-Min (CALCIUM 1200 PO), Take 1,200 mg by mouth daily, Disp: , Rfl:   •  Cetirizine-Pseudoephedrine (ZYRTEC-D ALLERGY & CONGESTION PO), as needed, Disp: , Rfl:   •  cholecalciferol (VITAMIN D3) 25 mcg (1,000 units) tablet, 2,000 Units daily, Disp: , Rfl:   •  cyclobenzaprine (FLEXERIL) 10 mg tablet, Take 1 tablet (10 mg total) by mouth daily at bedtime, Disp: 90 tablet, Rfl: 3  •  DULoxetine (CYMBALTA) 60 mg delayed release capsule, Take 1 capsule (60 mg total) by mouth daily, Disp: 30 capsule, Rfl: 5  •  fluticasone (FLONASE) 50 mcg/act nasal spray, 2 sprays into each nostril daily, Disp: 51 mL, Rfl: 3  •  glucose blood test strip, Use 1 each 3 (three) times a day Use as instructed, Disp: 100 each, Rfl: 3  •  Insulin Pen Needle 32G X 4 MM MISC, Use 1 each 3 (three) times a day, Disp: 100 each, Rfl: 3  •  liraglutide (Victoza) injection, Inject 0.1 mL (0.6 mg total) under the skin daily, Disp: 6 mL, Rfl: 5  •  Omega-3 Fatty Acids (fish oil) 1,000 mg, , Disp: , Rfl:   •  OneTouch Delica Lancets 33G MISC, Check blood sugars once daily. Please substitute with appropriate alternative as covered by patient's insurance. Dx: E11.65, Disp: 100 each, Rfl: 3      PHYSICAL EXAMINATION     Physical Exam  Constitutional:       Appearance: She is well-developed.   HENT:      Head: Normocephalic and atraumatic.   Eyes:      Pupils: Pupils are equal, round, and reactive to light.   Cardiovascular:      Rate and Rhythm: Normal rate and regular rhythm.      Heart sounds: Normal heart sounds.   Pulmonary:      Effort: Pulmonary effort is normal.   Abdominal:      General:  Bowel sounds are normal.      Palpations: Abdomen is soft.   Musculoskeletal:         General: Normal range of motion.      Cervical back: Neck supple.   Skin:     General: Skin is warm.   Neurological:      Mental Status: She is alert and oriented to person, place, and time.           LAB RESULTS     Results from last 6 Months   Lab Units 01/04/24  1403 12/04/23  0812 09/21/23  1259   WBC Thousand/uL 9.78 10.52* 9.15   HEMOGLOBIN g/dL 13.0 13.6 13.2   HEMATOCRIT % 42.0 42.4 41.0   PLATELETS Thousands/uL 361 362 328   POTASSIUM mmol/L 5.1 5.1 4.9   CHLORIDE mmol/L 103 105 102   CO2 mmol/L 32 31 31   BUN mg/dL 21 24 24   CREATININE mg/dL 1.17 1.23 1.24   CALCIUM mg/dL 10.9* 10.2 10.1   PHOSPHORUS mg/dL 3.6  --  3.5             RADIOLOGY RESULTS            IMPRESSION:     1. Status post right nephrectomy  2. Multiple left renal cysts without hydronephrosis or solid lesion detected. Unremarkable urinary bladder with left ureteral jet       ASSESSMENT/PLAN     67 F with PMH of nephrectomy in 2019, HTN, DM-2, proteinuria who presents to renal clinic for f/u    1) CKD IIIB: Baseline Cr has been 1.0-1.1  Recent labs revealing Cr of 1.1 mg/dl eGFR 47 and within baseline.    2) CKD- Mineral bone disorder: PTH intact, 25-hydroxy vitamin D levels are normal.  Calcium level is 10.9 and above target.  Patient is on calcium vitamin D supplements given presence of osteopenia.  Recommended patient to drink at least 64 ounces of water to ensure calcium levels remains less than 11.    3) Anemia: Hemoglobin is within target.     4) Proteinuria: Noted 110 mg of proteinuria and is near normal.  Restart patient on lisinopril 2.5 mg once daily    5) Hypertension: Blood pressure has been elevated above target in the range of 150 to 160 mmHg systolic.  Remains on atenolol.  Restart patient on lisinopril 2.5 mg once daily    6.  Arthritis: Patient has arthritis of right wrist and will be undergoing surgery.  She is cleared from my and to  undergo the recommended orthopedic surgery.  She is asked to hold off on lisinopril on the day of surgery.            Catalina Banks MD  Nephrology  1/9/2024

## 2024-01-25 DIAGNOSIS — F32.A DEPRESSION, UNSPECIFIED DEPRESSION TYPE: ICD-10-CM

## 2024-01-25 RX ORDER — DULOXETIN HYDROCHLORIDE 60 MG/1
60 CAPSULE, DELAYED RELEASE ORAL DAILY
Qty: 30 CAPSULE | Refills: 5 | Status: SHIPPED | OUTPATIENT
Start: 2024-01-25

## 2024-02-05 DIAGNOSIS — N18.31 STAGE 3A CHRONIC KIDNEY DISEASE (HCC): Primary | ICD-10-CM

## 2024-02-06 RX ORDER — LISINOPRIL 2.5 MG/1
2.5 TABLET ORAL DAILY
Qty: 30 TABLET | Refills: 3 | Status: SHIPPED | OUTPATIENT
Start: 2024-02-06

## 2024-04-15 ENCOUNTER — APPOINTMENT (OUTPATIENT)
Dept: LAB | Facility: CLINIC | Age: 69
End: 2024-04-15
Payer: MEDICARE

## 2024-04-15 DIAGNOSIS — N18.32 TYPE 2 DIABETES MELLITUS WITH STAGE 3B CHRONIC KIDNEY DISEASE, WITHOUT LONG-TERM CURRENT USE OF INSULIN (HCC): ICD-10-CM

## 2024-04-15 DIAGNOSIS — E79.0 HYPERURICEMIA: ICD-10-CM

## 2024-04-15 DIAGNOSIS — E11.22 TYPE 2 DIABETES MELLITUS WITH STAGE 3B CHRONIC KIDNEY DISEASE, WITHOUT LONG-TERM CURRENT USE OF INSULIN (HCC): ICD-10-CM

## 2024-04-15 LAB
ALBUMIN SERPL BCP-MCNC: 3.7 G/DL (ref 3.5–5)
ALP SERPL-CCNC: 68 U/L (ref 34–104)
ALT SERPL W P-5'-P-CCNC: 11 U/L (ref 7–52)
ANION GAP SERPL CALCULATED.3IONS-SCNC: 7 MMOL/L (ref 4–13)
AST SERPL W P-5'-P-CCNC: 14 U/L (ref 13–39)
BASOPHILS # BLD AUTO: 0.05 THOUSANDS/ÂΜL (ref 0–0.1)
BASOPHILS NFR BLD AUTO: 1 % (ref 0–1)
BILIRUB SERPL-MCNC: 0.3 MG/DL (ref 0.2–1)
BUN SERPL-MCNC: 28 MG/DL (ref 5–25)
CALCIUM SERPL-MCNC: 9.7 MG/DL (ref 8.4–10.2)
CHLORIDE SERPL-SCNC: 103 MMOL/L (ref 96–108)
CHOLEST SERPL-MCNC: 172 MG/DL
CO2 SERPL-SCNC: 30 MMOL/L (ref 21–32)
CREAT SERPL-MCNC: 1.12 MG/DL (ref 0.6–1.3)
EOSINOPHIL # BLD AUTO: 0.26 THOUSAND/ÂΜL (ref 0–0.61)
EOSINOPHIL NFR BLD AUTO: 3 % (ref 0–6)
ERYTHROCYTE [DISTWIDTH] IN BLOOD BY AUTOMATED COUNT: 17.6 % (ref 11.6–15.1)
EST. AVERAGE GLUCOSE BLD GHB EST-MCNC: 128 MG/DL
GFR SERPL CREATININE-BSD FRML MDRD: 50 ML/MIN/1.73SQ M
GLUCOSE P FAST SERPL-MCNC: 106 MG/DL (ref 65–99)
HBA1C MFR BLD: 6.1 %
HCT VFR BLD AUTO: 42.6 % (ref 34.8–46.1)
HDLC SERPL-MCNC: 51 MG/DL
HGB BLD-MCNC: 13.1 G/DL (ref 11.5–15.4)
IMM GRANULOCYTES # BLD AUTO: 0.03 THOUSAND/UL (ref 0–0.2)
IMM GRANULOCYTES NFR BLD AUTO: 0 % (ref 0–2)
LDLC SERPL CALC-MCNC: 106 MG/DL (ref 0–100)
LYMPHOCYTES # BLD AUTO: 2.61 THOUSANDS/ÂΜL (ref 0.6–4.47)
LYMPHOCYTES NFR BLD AUTO: 27 % (ref 14–44)
MCH RBC QN AUTO: 29.2 PG (ref 26.8–34.3)
MCHC RBC AUTO-ENTMCNC: 30.8 G/DL (ref 31.4–37.4)
MCV RBC AUTO: 95 FL (ref 82–98)
MONOCYTES # BLD AUTO: 0.73 THOUSAND/ÂΜL (ref 0.17–1.22)
MONOCYTES NFR BLD AUTO: 8 % (ref 4–12)
NEUTROPHILS # BLD AUTO: 5.88 THOUSANDS/ÂΜL (ref 1.85–7.62)
NEUTS SEG NFR BLD AUTO: 61 % (ref 43–75)
NRBC BLD AUTO-RTO: 0 /100 WBCS
PLATELET # BLD AUTO: 334 THOUSANDS/UL (ref 149–390)
PMV BLD AUTO: 12.4 FL (ref 8.9–12.7)
POTASSIUM SERPL-SCNC: 4.8 MMOL/L (ref 3.5–5.3)
PROT SERPL-MCNC: 6.4 G/DL (ref 6.4–8.4)
RBC # BLD AUTO: 4.48 MILLION/UL (ref 3.81–5.12)
SODIUM SERPL-SCNC: 140 MMOL/L (ref 135–147)
TRIGL SERPL-MCNC: 77 MG/DL
WBC # BLD AUTO: 9.56 THOUSAND/UL (ref 4.31–10.16)

## 2024-04-15 PROCEDURE — 80053 COMPREHEN METABOLIC PANEL: CPT

## 2024-04-15 PROCEDURE — 36415 COLL VENOUS BLD VENIPUNCTURE: CPT

## 2024-04-15 PROCEDURE — 85025 COMPLETE CBC W/AUTO DIFF WBC: CPT

## 2024-04-15 PROCEDURE — 83036 HEMOGLOBIN GLYCOSYLATED A1C: CPT

## 2024-04-15 PROCEDURE — 80061 LIPID PANEL: CPT

## 2024-04-22 ENCOUNTER — OFFICE VISIT (OUTPATIENT)
Dept: FAMILY MEDICINE CLINIC | Facility: CLINIC | Age: 69
End: 2024-04-22
Payer: COMMERCIAL

## 2024-04-22 VITALS
HEART RATE: 77 BPM | WEIGHT: 145.8 LBS | SYSTOLIC BLOOD PRESSURE: 116 MMHG | TEMPERATURE: 97.5 F | HEIGHT: 64 IN | OXYGEN SATURATION: 98 % | BODY MASS INDEX: 24.89 KG/M2 | DIASTOLIC BLOOD PRESSURE: 78 MMHG

## 2024-04-22 DIAGNOSIS — E11.22 TYPE 2 DIABETES MELLITUS WITH STAGE 3B CHRONIC KIDNEY DISEASE, WITHOUT LONG-TERM CURRENT USE OF INSULIN (HCC): ICD-10-CM

## 2024-04-22 DIAGNOSIS — M54.41 LOW BACK PAIN WITH BILATERAL SCIATICA, UNSPECIFIED BACK PAIN LATERALITY, UNSPECIFIED CHRONICITY: ICD-10-CM

## 2024-04-22 DIAGNOSIS — Z00.00 HEALTH MAINTENANCE EXAMINATION: ICD-10-CM

## 2024-04-22 DIAGNOSIS — F33.9 DEPRESSION, RECURRENT (HCC): ICD-10-CM

## 2024-04-22 DIAGNOSIS — M54.42 LOW BACK PAIN WITH BILATERAL SCIATICA, UNSPECIFIED BACK PAIN LATERALITY, UNSPECIFIED CHRONICITY: ICD-10-CM

## 2024-04-22 DIAGNOSIS — N18.31 STAGE 3A CHRONIC KIDNEY DISEASE (HCC): ICD-10-CM

## 2024-04-22 DIAGNOSIS — E78.5 HYPERLIPIDEMIA, UNSPECIFIED HYPERLIPIDEMIA TYPE: ICD-10-CM

## 2024-04-22 DIAGNOSIS — N18.32 TYPE 2 DIABETES MELLITUS WITH STAGE 3B CHRONIC KIDNEY DISEASE, WITHOUT LONG-TERM CURRENT USE OF INSULIN (HCC): ICD-10-CM

## 2024-04-22 DIAGNOSIS — Z12.31 ENCOUNTER FOR SCREENING MAMMOGRAM FOR MALIGNANT NEOPLASM OF BREAST: Primary | ICD-10-CM

## 2024-04-22 DIAGNOSIS — Z72.0 TOBACCO USE: ICD-10-CM

## 2024-04-22 DIAGNOSIS — I10 PRIMARY HYPERTENSION: ICD-10-CM

## 2024-04-22 DIAGNOSIS — E55.9 VITAMIN D DEFICIENCY: ICD-10-CM

## 2024-04-22 LAB
CREAT UR-MCNC: 194.8 MG/DL
MICROALBUMIN UR-MCNC: 234.4 MG/L
MICROALBUMIN/CREAT 24H UR: 120 MG/G CREATININE (ref 0–30)

## 2024-04-22 PROCEDURE — 99213 OFFICE O/P EST LOW 20 MIN: CPT | Performed by: FAMILY MEDICINE

## 2024-04-22 PROCEDURE — 99397 PER PM REEVAL EST PAT 65+ YR: CPT | Performed by: FAMILY MEDICINE

## 2024-04-22 PROCEDURE — 82570 ASSAY OF URINE CREATININE: CPT | Performed by: FAMILY MEDICINE

## 2024-04-22 PROCEDURE — 82043 UR ALBUMIN QUANTITATIVE: CPT | Performed by: FAMILY MEDICINE

## 2024-04-22 NOTE — PROGRESS NOTES
Name: Sam Peacock      : 1955      MRN: [id number]  Encounter Provider: Liang Milligan MD  Encounter Date: 2024   Encounter department: [address]    Assessment & Plan   Return visit in 4 months with fasting blood prior to visit  1. Encounter for screening mammogram for malignant neoplasm of breast  -     Mammo screening bilateral w 3d & cad; Future    2. Type 2 diabetes mellitus with stage 3b chronic kidney disease, without long-term current use of insulin (HCC)  -     Albumin / creatinine urine ratio; Future  -     Albumin / creatinine urine ratio  -     Hemoglobin A1C; Future; Expected date: 2024    3. Health maintenance examination    4. Primary hypertension    5. Stage 3a chronic kidney disease (HCC)    6. Depression, recurrent (HCC)    7. Tobacco use    8. Hyperlipidemia, unspecified hyperlipidemia type  -     Comprehensive metabolic panel; Future; Expected date: 2024  -     CBC and differential; Future; Expected date: 2024  -     Lipid Panel with Direct LDL reflex; Future; Expected date: 2024    9. Vitamin D deficiency    10. Low back pain with bilateral sciatica, unspecified back pain laterality, unspecified chronicity  Assessment & Plan:  Patient given handout on exercise for low back pain.          Depression Screening and Follow-up Plan: Patient's depression screening was positive with a PHQ-9 score of 10. Patient with underlying depression and was advised to continue current medications as prescribed.         Subjective      Patient comes in for checkup.  She complains of low back pain with radiation down the back of both of her legs that occurs after she walks about 1 mile.      Review of Systems   Constitutional: Negative.    Respiratory: Negative.     Cardiovascular: Negative.    Gastrointestinal: Negative.    Musculoskeletal:  Positive for back pain.       Current Outpatient Medications on File Prior to Visit  "  Medication Sig    allopurinol (ZYLOPRIM) 300 mg tablet Take 0.5 tablets (150 mg total) by mouth daily    aspirin 81 mg chewable tablet Chew 81 mg in the morning    atenolol (TENORMIN) 50 mg tablet Take 1 tablet (50 mg total) by mouth every morning    atorvastatin (LIPITOR) 10 mg tablet Take 1 tablet (10 mg total) by mouth in the morning    Cetirizine-Pseudoephedrine (ZYRTEC-D ALLERGY & CONGESTION PO) as needed    cholecalciferol (VITAMIN D3) 25 mcg (1,000 units) tablet 2,000 Units daily    DULoxetine (CYMBALTA) 60 mg delayed release capsule take 1 capsule by mouth once daily    fluticasone (FLONASE) 50 mcg/act nasal spray 2 sprays into each nostril daily    glucose blood test strip Use 1 each 3 (three) times a day Use as instructed    Insulin Pen Needle 32G X 4 MM MISC Use 1 each 3 (three) times a day    liraglutide (Victoza) injection Inject 0.1 mL (0.6 mg total) under the skin daily    lisinopril (ZESTRIL) 2.5 mg tablet Take 1 tablet (2.5 mg total) by mouth daily    Omega-3 Fatty Acids (fish oil) 1,000 mg     OneTouch Delica Lancets 33G MISC Check blood sugars once daily. Please substitute with appropriate alternative as covered by patient's insurance. Dx: E11.65    Calcium Carbonate-Vit D-Min (CALCIUM 1200 PO) Take 1,200 mg by mouth daily (Patient not taking: Reported on 4/22/2024)    cyclobenzaprine (FLEXERIL) 10 mg tablet Take 1 tablet (10 mg total) by mouth daily at bedtime (Patient not taking: Reported on 4/22/2024)       Objective     /78 (BP Location: Left arm, Patient Position: Sitting, Cuff Size: Standard)   Pulse 77   Temp 97.5 °F (36.4 °C) (Tympanic)   Ht 5' 4.2\" (1.631 m)   Wt 66.1 kg (145 lb 12.8 oz)   SpO2 98%   BMI 24.87 kg/m²     Physical Exam  Constitutional:       Appearance: Normal appearance. She is well-developed.   HENT:      Head: Normocephalic and atraumatic.      Right Ear: External ear normal.      Left Ear: External ear normal.   Eyes:      Pupils: Pupils are equal, round, " and reactive to light.   Neck:      Thyroid: No thyromegaly.   Cardiovascular:      Rate and Rhythm: Normal rate and regular rhythm.      Pulses: no weak pulses.           Dorsalis pedis pulses are 1+ on the right side and 1+ on the left side.        Posterior tibial pulses are 1+ on the right side and 1+ on the left side.      Heart sounds: Normal heart sounds.   Pulmonary:      Effort: Pulmonary effort is normal.      Breath sounds: Normal breath sounds.   Musculoskeletal:         General: Normal range of motion.      Cervical back: Neck supple.      Comments: Straight leg raising is negative bilaterally.  Strength to hip flexion 5/5 bilaterally   Feet:      Right foot:      Skin integrity: No ulcer, skin breakdown, erythema, warmth, callus or dry skin.      Left foot:      Skin integrity: No ulcer, skin breakdown, erythema, warmth, callus or dry skin.   Lymphadenopathy:      Cervical: No cervical adenopathy.   Skin:     General: Skin is warm and dry.   Neurological:      Mental Status: She is alert.   Psychiatric:         Mood and Affect: Mood normal.         Behavior: Behavior normal.     Diabetic Foot Exam    Patient's shoes and socks removed.    Right Foot/Ankle   Right Foot Inspection  Skin Exam: skin normal and skin intact. No dry skin, no warmth, no callus, no erythema, no maceration, no abnormal color, no pre-ulcer, no ulcer and no callus.     Toe Exam: ROM and strength within normal limits.     Sensory   Vibration: intact  Proprioception: intact  Monofilament testing: intact    Vascular  The right DP pulse is 1+. The right PT pulse is 1+.     Left Foot/Ankle  Left Foot Inspection  Skin Exam: skin normal and skin intact. No dry skin, no warmth, no erythema, no maceration, normal color, no pre-ulcer, no ulcer and no callus.     Toe Exam: ROM and strength within normal limits.     Sensory   Vibration: intact  Proprioception: intact  Monofilament testing: intact    Vascular  The left DP pulse is 1+. The left  PT pulse is 1+.     Assign Risk Category  No deformity present  No loss of protective sensation  No weak pulses  Risk: 0    Liang Milligan MD

## 2024-05-16 ENCOUNTER — HOSPITAL ENCOUNTER (OUTPATIENT)
Age: 69
Discharge: HOME | End: 2024-05-16
Payer: COMMERCIAL

## 2024-05-16 VITALS — HEIGHT: 63 IN | WEIGHT: 145 LBS | BODY MASS INDEX: 25.69 KG/M2

## 2024-05-16 DIAGNOSIS — Z12.31 ENCOUNTER FOR SCREENING MAMMOGRAM FOR MALIGNANT NEOPLASM OF BREAST: ICD-10-CM

## 2024-05-16 PROCEDURE — 77063 BREAST TOMOSYNTHESIS BI: CPT

## 2024-05-16 PROCEDURE — 77067 SCR MAMMO BI INCL CAD: CPT

## 2024-05-20 DIAGNOSIS — E11.22 TYPE 2 DIABETES MELLITUS WITH STAGE 3A CHRONIC KIDNEY DISEASE, WITHOUT LONG-TERM CURRENT USE OF INSULIN (HCC): ICD-10-CM

## 2024-05-20 DIAGNOSIS — N18.31 TYPE 2 DIABETES MELLITUS WITH STAGE 3A CHRONIC KIDNEY DISEASE, WITHOUT LONG-TERM CURRENT USE OF INSULIN (HCC): ICD-10-CM

## 2024-05-21 RX ORDER — LIRAGLUTIDE 6 MG/ML
INJECTION SUBCUTANEOUS
Qty: 6 ML | Refills: 1 | Status: SHIPPED | OUTPATIENT
Start: 2024-05-21

## 2024-05-22 PROBLEM — Z00.00 HEALTH MAINTENANCE EXAMINATION: Status: RESOLVED | Noted: 2023-02-14 | Resolved: 2024-05-22

## 2024-05-30 DIAGNOSIS — N18.31 STAGE 3A CHRONIC KIDNEY DISEASE (HCC): ICD-10-CM

## 2024-05-31 RX ORDER — LISINOPRIL 2.5 MG/1
2.5 TABLET ORAL DAILY
Qty: 30 TABLET | Refills: 5 | Status: SHIPPED | OUTPATIENT
Start: 2024-05-31

## 2024-07-09 ENCOUNTER — TELEPHONE (OUTPATIENT)
Dept: NEPHROLOGY | Facility: CLINIC | Age: 69
End: 2024-07-09

## 2024-07-09 DIAGNOSIS — E79.0 HYPERURICEMIA: ICD-10-CM

## 2024-07-09 DIAGNOSIS — E11.22 TYPE 2 DIABETES MELLITUS WITH STAGE 3A CHRONIC KIDNEY DISEASE, WITHOUT LONG-TERM CURRENT USE OF INSULIN (HCC): ICD-10-CM

## 2024-07-09 DIAGNOSIS — N18.31 TYPE 2 DIABETES MELLITUS WITH STAGE 3A CHRONIC KIDNEY DISEASE, WITHOUT LONG-TERM CURRENT USE OF INSULIN (HCC): ICD-10-CM

## 2024-07-10 RX ORDER — PEN NEEDLE, DIABETIC 32GX 5/32"
NEEDLE, DISPOSABLE MISCELLANEOUS 3 TIMES DAILY
Qty: 100 EACH | Refills: 2 | Status: SHIPPED | OUTPATIENT
Start: 2024-07-10

## 2024-07-10 RX ORDER — ALLOPURINOL 300 MG/1
300 TABLET ORAL DAILY
Qty: 100 TABLET | Refills: 1 | Status: SHIPPED | OUTPATIENT
Start: 2024-07-10

## 2024-07-18 ENCOUNTER — TELEPHONE (OUTPATIENT)
Dept: NEPHROLOGY | Facility: CLINIC | Age: 69
End: 2024-07-18

## 2024-07-18 NOTE — TELEPHONE ENCOUNTER
I called [employer] @ [phone] to verify eligibility for the patient and spoke with Jacqueline MONTA[place] ( customer Service) patient is currently active since 10/01/2022 and in network. Reference # for this call is [id number]

## 2024-07-19 ENCOUNTER — APPOINTMENT (OUTPATIENT)
Dept: LAB | Facility: CLINIC | Age: 69
End: 2024-07-19
Payer: COMMERCIAL

## 2024-07-19 LAB
25(OH)D3 SERPL-MCNC: 49.8 NG/ML (ref 30–100)
ALBUMIN SERPL BCG-MCNC: 4.1 G/DL (ref 3.5–5)
ALP SERPL-CCNC: 71 U/L (ref 34–104)
ALT SERPL W P-5'-P-CCNC: 13 U/L (ref 7–52)
ANION GAP SERPL CALCULATED.3IONS-SCNC: 4 MMOL/L (ref 4–13)
AST SERPL W P-5'-P-CCNC: 17 U/L (ref 13–39)
BACTERIA UR QL AUTO: ABNORMAL /HPF
BASOPHILS # BLD AUTO: 0.05 THOUSANDS/ÂΜL (ref 0–0.1)
BASOPHILS NFR BLD AUTO: 1 % (ref 0–1)
BILIRUB SERPL-MCNC: 0.43 MG/DL (ref 0.2–1)
BILIRUB UR QL STRIP: NEGATIVE
BUN SERPL-MCNC: 21 MG/DL (ref 5–25)
CALCIUM SERPL-MCNC: 10.4 MG/DL (ref 8.4–10.2)
CHLORIDE SERPL-SCNC: 103 MMOL/L (ref 96–108)
CLARITY UR: CLEAR
CO2 SERPL-SCNC: 31 MMOL/L (ref 21–32)
COLOR UR: YELLOW
CREAT SERPL-MCNC: 1.15 MG/DL (ref 0.6–1.3)
CREAT UR-MCNC: 203.8 MG/DL
EOSINOPHIL # BLD AUTO: 0.25 THOUSAND/ÂΜL (ref 0–0.61)
EOSINOPHIL NFR BLD AUTO: 2 % (ref 0–6)
ERYTHROCYTE [DISTWIDTH] IN BLOOD BY AUTOMATED COUNT: 16 % (ref 11.6–15.1)
GFR SERPL CREATININE-BSD FRML MDRD: 48 ML/MIN/1.73SQ M
GLUCOSE P FAST SERPL-MCNC: 100 MG/DL (ref 65–99)
GLUCOSE UR STRIP-MCNC: NEGATIVE MG/DL
HCT VFR BLD AUTO: 41.7 % (ref 34.8–46.1)
HGB BLD-MCNC: 13.1 G/DL (ref 11.5–15.4)
HGB UR QL STRIP.AUTO: NEGATIVE
IMM GRANULOCYTES # BLD AUTO: 0.07 THOUSAND/UL (ref 0–0.2)
IMM GRANULOCYTES NFR BLD AUTO: 1 % (ref 0–2)
KETONES UR STRIP-MCNC: NEGATIVE MG/DL
LEUKOCYTE ESTERASE UR QL STRIP: NEGATIVE
LYMPHOCYTES # BLD AUTO: 2.56 THOUSANDS/ÂΜL (ref 0.6–4.47)
LYMPHOCYTES NFR BLD AUTO: 23 % (ref 14–44)
MCH RBC QN AUTO: 30.2 PG (ref 26.8–34.3)
MCHC RBC AUTO-ENTMCNC: 31.4 G/DL (ref 31.4–37.4)
MCV RBC AUTO: 96 FL (ref 82–98)
MONOCYTES # BLD AUTO: 0.83 THOUSAND/ÂΜL (ref 0.17–1.22)
MONOCYTES NFR BLD AUTO: 8 % (ref 4–12)
MUCOUS THREADS UR QL AUTO: ABNORMAL
NEUTROPHILS # BLD AUTO: 7.26 THOUSANDS/ÂΜL (ref 1.85–7.62)
NEUTS SEG NFR BLD AUTO: 65 % (ref 43–75)
NITRITE UR QL STRIP: NEGATIVE
NON-SQ EPI CELLS URNS QL MICRO: ABNORMAL /HPF
NRBC BLD AUTO-RTO: 0 /100 WBCS
PH UR STRIP.AUTO: 6 [PH]
PHOSPHATE SERPL-MCNC: 3.3 MG/DL (ref 2.3–4.1)
PLATELET # BLD AUTO: 338 THOUSANDS/UL (ref 149–390)
PMV BLD AUTO: 12.4 FL (ref 8.9–12.7)
POTASSIUM SERPL-SCNC: 5.6 MMOL/L (ref 3.5–5.3)
PROT SERPL-MCNC: 6.9 G/DL (ref 6.4–8.4)
PROT UR STRIP-MCNC: ABNORMAL MG/DL
PROT UR-MCNC: 33 MG/DL
PROT/CREAT UR: 0.16 MG/G{CREAT} (ref 0–0.1)
PTH-INTACT SERPL-MCNC: 68.9 PG/ML (ref 12–88)
RBC # BLD AUTO: 4.34 MILLION/UL (ref 3.81–5.12)
RBC #/AREA URNS AUTO: ABNORMAL /HPF
SODIUM SERPL-SCNC: 138 MMOL/L (ref 135–147)
SP GR UR STRIP.AUTO: 1.02 (ref 1–1.03)
UROBILINOGEN UR STRIP-ACNC: 2 MG/DL
WBC # BLD AUTO: 11.02 THOUSAND/UL (ref 4.31–10.16)
WBC #/AREA URNS AUTO: ABNORMAL /HPF

## 2024-07-22 ENCOUNTER — TELEPHONE (OUTPATIENT)
Dept: NEPHROLOGY | Facility: CLINIC | Age: 69
End: 2024-07-22

## 2024-07-22 NOTE — TELEPHONE ENCOUNTER
Called and spoke to pt. Advised to stop lisinopril and to drink 5 bottles of water daily due to elevated potassium. Pt understood and was ok with that.

## 2024-07-22 NOTE — TELEPHONE ENCOUNTER
----- Message from Catalina Banks MD sent at 7/20/2024  2:46 PM EDT -----  Hi  Patient had labs done which revealed slightly elevated potassium of 5.6. Patient is to see me next week. Please call patient and tell her to stop Lisinopril and drink at least 5  bottles of water daily to reduce potassium level.   Dr. Banks

## 2024-07-23 ENCOUNTER — OFFICE VISIT (OUTPATIENT)
Dept: NEPHROLOGY | Facility: CLINIC | Age: 69
End: 2024-07-23
Payer: COMMERCIAL

## 2024-07-23 VITALS
HEART RATE: 70 BPM | RESPIRATION RATE: 18 BRPM | DIASTOLIC BLOOD PRESSURE: 74 MMHG | TEMPERATURE: 97.5 F | HEIGHT: 63 IN | WEIGHT: 149.2 LBS | SYSTOLIC BLOOD PRESSURE: 122 MMHG | OXYGEN SATURATION: 95 % | BODY MASS INDEX: 26.44 KG/M2

## 2024-07-23 DIAGNOSIS — N18.31 STAGE 3A CHRONIC KIDNEY DISEASE (HCC): Primary | ICD-10-CM

## 2024-07-23 PROCEDURE — 99214 OFFICE O/P EST MOD 30 MIN: CPT | Performed by: INTERNAL MEDICINE

## 2024-07-23 NOTE — PROGRESS NOTES
NEPHROLOGY PROGRESS NOTE    Patient: Sam Peacock               Sex: female          DOA: No admission date for patient encounter.   YOB: 1955        Age:  68 y.o.       7/23/2024        BACKGROUND     67 y.o. F with PMH of solitary kidney after undergoing nephrectomy in 2016, DM-2, HTN, CKD IIIa who has been following up in renal office since Jan 2023    SUBJECTIVE     Patient is following up after 6 months.  Offers no major complaints.  The patient was asked to stop lisinopril 2.5 mg daily due to mildly elevated potassium levels.  REVIEW OF SYSTEMS     Review of Systems   Constitutional: Negative.    HENT: Negative.     Eyes: Negative.    Respiratory: Negative.     Cardiovascular: Negative.    Gastrointestinal: Negative.    Endocrine: Negative.    Genitourinary: Negative.    Musculoskeletal: Negative.    Skin: Negative.    Allergic/Immunologic: Negative.    Neurological: Negative.    Hematological: Negative.    All other systems reviewed and are negative.      OBJECTIVE     Current Weight: Weight - Scale: 67.7 kg (149 lb 3.2 oz)  Vitals:    07/23/24 0950   BP: 122/74   Pulse: 70   Resp: 18   Temp: 97.5 °F (36.4 °C)   SpO2: 95%     Body mass index is 26.43 kg/m².      CURRENT MEDICATIONS       Current Outpatient Medications:     allopurinol (ZYLOPRIM) 300 mg tablet, take 1 tablet by mouth once daily (Patient taking differently: Take 300 mg by mouth daily Patient takes half a tablet.), Disp: 100 tablet, Rfl: 1    aspirin 81 mg chewable tablet, Chew 81 mg in the morning, Disp: , Rfl:     atenolol (TENORMIN) 50 mg tablet, Take 1 tablet (50 mg total) by mouth every morning, Disp: 90 tablet, Rfl: 3    atorvastatin (LIPITOR) 10 mg tablet, Take 1 tablet (10 mg total) by mouth in the morning, Disp: 90 tablet, Rfl: 5    Cetirizine-Pseudoephedrine (ZYRTEC-D ALLERGY & CONGESTION PO), as needed, Disp: , Rfl:     cholecalciferol (VITAMIN D3) 25 mcg (1,000 units) tablet, 2,000 Units daily, Disp: , Rfl:      DULoxetine (CYMBALTA) 60 mg delayed release capsule, take 1 capsule by mouth once daily, Disp: 30 capsule, Rfl: 5    fluticasone (FLONASE) 50 mcg/act nasal spray, 2 sprays into each nostril daily, Disp: 51 mL, Rfl: 3    glucose blood test strip, Use 1 each 3 (three) times a day Use as instructed, Disp: 100 each, Rfl: 3    Insulin Pen Needle (Droplet Pen Needles) 32G X 4 MM MISC, USE 3 TIMES A DAY AS DIRECTED, Disp: 100 each, Rfl: 2    liraglutide (Victoza) injection, inject 0.6 milligram subcutaneously daily, Disp: 6 mL, Rfl: 1    Omega-3 Fatty Acids (fish oil) 1,000 mg, , Disp: , Rfl:     OneTouch Delica Lancets 33G MISC, Check blood sugars once daily. Please substitute with appropriate alternative as covered by patient's insurance. Dx: E11.65, Disp: 100 each, Rfl: 3    Calcium Carbonate-Vit D-Min (CALCIUM 1200 PO), Take 1,200 mg by mouth daily (Patient not taking: Reported on 4/22/2024), Disp: , Rfl:     cyclobenzaprine (FLEXERIL) 10 mg tablet, Take 1 tablet (10 mg total) by mouth daily at bedtime (Patient not taking: Reported on 4/22/2024), Disp: 90 tablet, Rfl: 3    lisinopril (ZESTRIL) 2.5 mg tablet, take 1 tablet by mouth once daily (Patient not taking: Reported on 7/23/2024), Disp: 30 tablet, Rfl: 5      PHYSICAL EXAMINATION     Physical Exam  Constitutional:       Appearance: She is well-developed.   HENT:      Head: Normocephalic and atraumatic.   Eyes:      Pupils: Pupils are equal, round, and reactive to light.   Cardiovascular:      Rate and Rhythm: Normal rate and regular rhythm.      Heart sounds: Normal heart sounds.   Pulmonary:      Effort: Pulmonary effort is normal.   Abdominal:      General: Bowel sounds are normal.      Palpations: Abdomen is soft.   Musculoskeletal:         General: Normal range of motion.      Cervical back: Neck supple.   Skin:     General: Skin is warm.   Neurological:      Mental Status: She is alert and oriented to person, place, and time.           LAB RESULTS     Results  from last 6 Months   Lab Units 07/19/24  1010 04/15/24  0858   WBC Thousand/uL 11.02* 9.56   HEMOGLOBIN g/dL 13.1 13.1   HEMATOCRIT % 41.7 42.6   PLATELETS Thousands/uL 338 334   POTASSIUM mmol/L 5.6* 4.8   CHLORIDE mmol/L 103 103   CO2 mmol/L 31 30   BUN mg/dL 21 28*   CREATININE mg/dL 1.15 1.12   CALCIUM mg/dL 10.4* 9.7   PHOSPHORUS mg/dL 3.3  --              RADIOLOGY RESULTS            IMPRESSION:     1. Status post right nephrectomy  2. Multiple left renal cysts without hydronephrosis or solid lesion detected. Unremarkable urinary bladder with left ureteral jet       ASSESSMENT/PLAN     67 F with PMH of nephrectomy in 2019, HTN, DM-2, proteinuria who presents to renal clinic for f/u    1) CKD IIIa: Baseline Cr has been 1.0-1.1  Recent labs revealing Cr of 1.15 mg/dl eGFR 48 and within baseline.    2) CKD- Mineral bone disorder: PTH intact, 25-hydroxy vitamin D levels are normal.  Calcium level is 10.4 and above target.  Patient is on calcium vitamin D supplements given presence of osteopenia.      3) Anemia: Hemoglobin is within target at 13.1 g/dL.    4) Proteinuria: Noted 120 mg of proteinuria per urine microalbumin over creatinine ratio and elevated.  Will assess UACR prior to next visit.  Due to hyperkalemia, unfortunately ACE inhibitor had to be discontinued.    5) Hypertension: Blood pressure has significantly improved and within acceptable range at 122/74 today.  Remains on atenolol alone.    6.  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    7.  Hyperkalemia: Noted potassium 5.6.  Likely due to ingestion of high potassium food in the setting of solitary kidney..            Catalina Banks MD  Nephrology  7/23/2024

## 2024-08-01 DIAGNOSIS — E11.22 TYPE 2 DIABETES MELLITUS WITH STAGE 3A CHRONIC KIDNEY DISEASE, WITHOUT LONG-TERM CURRENT USE OF INSULIN (HCC): ICD-10-CM

## 2024-08-01 DIAGNOSIS — N18.31 TYPE 2 DIABETES MELLITUS WITH STAGE 3A CHRONIC KIDNEY DISEASE, WITHOUT LONG-TERM CURRENT USE OF INSULIN (HCC): ICD-10-CM

## 2024-08-02 RX ORDER — BLOOD SUGAR DIAGNOSTIC
STRIP MISCELLANEOUS 3 TIMES DAILY
Qty: 100 STRIP | Refills: 1 | Status: SHIPPED | OUTPATIENT
Start: 2024-08-02

## 2024-08-02 RX ORDER — LANCETS 33 GAUGE
EACH MISCELLANEOUS
Qty: 100 EACH | Refills: 1 | Status: SHIPPED | OUTPATIENT
Start: 2024-08-02

## 2024-08-07 DIAGNOSIS — E78.5 HYPERLIPIDEMIA, UNSPECIFIED HYPERLIPIDEMIA TYPE: ICD-10-CM

## 2024-08-07 DIAGNOSIS — F32.A DEPRESSION, UNSPECIFIED DEPRESSION TYPE: ICD-10-CM

## 2024-08-07 RX ORDER — DULOXETIN HYDROCHLORIDE 60 MG/1
60 CAPSULE, DELAYED RELEASE ORAL DAILY
Qty: 30 CAPSULE | Refills: 5 | Status: SHIPPED | OUTPATIENT
Start: 2024-08-07

## 2024-08-07 RX ORDER — ATORVASTATIN CALCIUM 10 MG/1
10 TABLET, FILM COATED ORAL EVERY MORNING
Qty: 100 TABLET | Refills: 1 | Status: SHIPPED | OUTPATIENT
Start: 2024-08-07

## 2024-08-22 ENCOUNTER — APPOINTMENT (OUTPATIENT)
Dept: LAB | Facility: CLINIC | Age: 69
End: 2024-08-22
Payer: COMMERCIAL

## 2024-08-22 DIAGNOSIS — E78.5 HYPERLIPIDEMIA, UNSPECIFIED HYPERLIPIDEMIA TYPE: ICD-10-CM

## 2024-08-22 DIAGNOSIS — N18.32 TYPE 2 DIABETES MELLITUS WITH STAGE 3B CHRONIC KIDNEY DISEASE, WITHOUT LONG-TERM CURRENT USE OF INSULIN (HCC): ICD-10-CM

## 2024-08-22 DIAGNOSIS — E11.22 TYPE 2 DIABETES MELLITUS WITH STAGE 3B CHRONIC KIDNEY DISEASE, WITHOUT LONG-TERM CURRENT USE OF INSULIN (HCC): ICD-10-CM

## 2024-08-22 LAB
ALBUMIN SERPL BCG-MCNC: 3.9 G/DL (ref 3.5–5)
ALP SERPL-CCNC: 73 U/L (ref 34–104)
ALT SERPL W P-5'-P-CCNC: 17 U/L (ref 7–52)
ANION GAP SERPL CALCULATED.3IONS-SCNC: 7 MMOL/L (ref 4–13)
AST SERPL W P-5'-P-CCNC: 19 U/L (ref 13–39)
BASOPHILS # BLD AUTO: 0.06 THOUSANDS/ÂΜL (ref 0–0.1)
BASOPHILS NFR BLD AUTO: 1 % (ref 0–1)
BILIRUB SERPL-MCNC: 0.34 MG/DL (ref 0.2–1)
BUN SERPL-MCNC: 20 MG/DL (ref 5–25)
CALCIUM SERPL-MCNC: 10.4 MG/DL (ref 8.4–10.2)
CHLORIDE SERPL-SCNC: 100 MMOL/L (ref 96–108)
CHOLEST SERPL-MCNC: 192 MG/DL
CO2 SERPL-SCNC: 30 MMOL/L (ref 21–32)
CREAT SERPL-MCNC: 1.15 MG/DL (ref 0.6–1.3)
EOSINOPHIL # BLD AUTO: 0.37 THOUSAND/ÂΜL (ref 0–0.61)
EOSINOPHIL NFR BLD AUTO: 4 % (ref 0–6)
ERYTHROCYTE [DISTWIDTH] IN BLOOD BY AUTOMATED COUNT: 16.6 % (ref 11.6–15.1)
EST. AVERAGE GLUCOSE BLD GHB EST-MCNC: 120 MG/DL
GFR SERPL CREATININE-BSD FRML MDRD: 48 ML/MIN/1.73SQ M
GLUCOSE P FAST SERPL-MCNC: 91 MG/DL (ref 65–99)
HBA1C MFR BLD: 5.8 %
HCT VFR BLD AUTO: 42.3 % (ref 34.8–46.1)
HDLC SERPL-MCNC: 52 MG/DL
HGB BLD-MCNC: 13 G/DL (ref 11.5–15.4)
IMM GRANULOCYTES # BLD AUTO: 0.05 THOUSAND/UL (ref 0–0.2)
IMM GRANULOCYTES NFR BLD AUTO: 1 % (ref 0–2)
LDLC SERPL CALC-MCNC: 120 MG/DL (ref 0–100)
LYMPHOCYTES # BLD AUTO: 2.71 THOUSANDS/ÂΜL (ref 0.6–4.47)
LYMPHOCYTES NFR BLD AUTO: 27 % (ref 14–44)
MCH RBC QN AUTO: 30.3 PG (ref 26.8–34.3)
MCHC RBC AUTO-ENTMCNC: 30.7 G/DL (ref 31.4–37.4)
MCV RBC AUTO: 99 FL (ref 82–98)
MONOCYTES # BLD AUTO: 0.83 THOUSAND/ÂΜL (ref 0.17–1.22)
MONOCYTES NFR BLD AUTO: 8 % (ref 4–12)
NEUTROPHILS # BLD AUTO: 6.22 THOUSANDS/ÂΜL (ref 1.85–7.62)
NEUTS SEG NFR BLD AUTO: 59 % (ref 43–75)
NRBC BLD AUTO-RTO: 0 /100 WBCS
PLATELET # BLD AUTO: 342 THOUSANDS/UL (ref 149–390)
PMV BLD AUTO: 12.4 FL (ref 8.9–12.7)
POTASSIUM SERPL-SCNC: 5.5 MMOL/L (ref 3.5–5.3)
PROT SERPL-MCNC: 6.9 G/DL (ref 6.4–8.4)
RBC # BLD AUTO: 4.29 MILLION/UL (ref 3.81–5.12)
SODIUM SERPL-SCNC: 137 MMOL/L (ref 135–147)
TRIGL SERPL-MCNC: 101 MG/DL
WBC # BLD AUTO: 10.24 THOUSAND/UL (ref 4.31–10.16)

## 2024-08-22 PROCEDURE — 83036 HEMOGLOBIN GLYCOSYLATED A1C: CPT

## 2024-08-22 PROCEDURE — 80053 COMPREHEN METABOLIC PANEL: CPT

## 2024-08-22 PROCEDURE — 36415 COLL VENOUS BLD VENIPUNCTURE: CPT

## 2024-08-22 PROCEDURE — 80061 LIPID PANEL: CPT

## 2024-08-22 PROCEDURE — 85025 COMPLETE CBC W/AUTO DIFF WBC: CPT

## 2024-08-26 ENCOUNTER — OFFICE VISIT (OUTPATIENT)
Dept: FAMILY MEDICINE CLINIC | Facility: CLINIC | Age: 69
End: 2024-08-26
Payer: COMMERCIAL

## 2024-08-26 ENCOUNTER — TELEPHONE (OUTPATIENT)
Age: 69
End: 2024-08-26

## 2024-08-26 VITALS
WEIGHT: 147 LBS | OXYGEN SATURATION: 96 % | HEIGHT: 63 IN | HEART RATE: 78 BPM | TEMPERATURE: 98.4 F | DIASTOLIC BLOOD PRESSURE: 86 MMHG | BODY MASS INDEX: 26.05 KG/M2 | SYSTOLIC BLOOD PRESSURE: 124 MMHG

## 2024-08-26 DIAGNOSIS — M25.552 PAIN OF LEFT HIP: ICD-10-CM

## 2024-08-26 DIAGNOSIS — N18.31 STAGE 3A CHRONIC KIDNEY DISEASE (HCC): ICD-10-CM

## 2024-08-26 DIAGNOSIS — N18.32 TYPE 2 DIABETES MELLITUS WITH STAGE 3B CHRONIC KIDNEY DISEASE, WITHOUT LONG-TERM CURRENT USE OF INSULIN (HCC): ICD-10-CM

## 2024-08-26 DIAGNOSIS — E11.22 TYPE 2 DIABETES MELLITUS WITH STAGE 3B CHRONIC KIDNEY DISEASE, WITHOUT LONG-TERM CURRENT USE OF INSULIN (HCC): ICD-10-CM

## 2024-08-26 DIAGNOSIS — Q61.02 MULTIPLE RENAL CYSTS: ICD-10-CM

## 2024-08-26 DIAGNOSIS — E78.5 HYPERLIPIDEMIA, UNSPECIFIED HYPERLIPIDEMIA TYPE: ICD-10-CM

## 2024-08-26 DIAGNOSIS — E55.9 VITAMIN D DEFICIENCY: ICD-10-CM

## 2024-08-26 DIAGNOSIS — E79.0 HYPERURICEMIA: ICD-10-CM

## 2024-08-26 DIAGNOSIS — Z90.5 HX OF UNILATERAL NEPHRECTOMY: ICD-10-CM

## 2024-08-26 DIAGNOSIS — F33.9 DEPRESSION, RECURRENT (HCC): ICD-10-CM

## 2024-08-26 DIAGNOSIS — I10 PRIMARY HYPERTENSION: Primary | ICD-10-CM

## 2024-08-26 DIAGNOSIS — G47.00 INSOMNIA, UNSPECIFIED TYPE: ICD-10-CM

## 2024-08-26 PROCEDURE — 99214 OFFICE O/P EST MOD 30 MIN: CPT | Performed by: FAMILY MEDICINE

## 2024-08-26 NOTE — ASSESSMENT & PLAN NOTE
Continue Victoza 0.6 mg daily.  Lab Results   Component Value Date    HGBA1C 5.8 (H) 08/22/2024
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
## 2024-08-26 NOTE — PROGRESS NOTES
Assessment/Plan:    Return visit in 4 months with fasting blood prior to visit.     Problem List Items Addressed This Visit       Hx of unilateral nephrectomy    Stage 3a chronic kidney disease (HCC)    Multiple renal cysts    Hyperlipidemia     Continue Lipitor 10 mg daily         Relevant Orders    Comprehensive metabolic panel    CBC and differential    Lipid Panel with Direct LDL reflex    Hyperuricemia     Continue allopurinol 300 mg daily         Type 2 diabetes mellitus with chronic kidney disease, without long-term current use of insulin (HCC)     Continue Victoza 0.6 mg daily.  Lab Results   Component Value Date    HGBA1C 5.8 (H) 08/22/2024            Relevant Orders    Hemoglobin A1C    Depression, recurrent (HCC)    Vitamin D deficiency    Primary hypertension - Primary     Continue atenolol 50 mg daily         Insomnia    Pain of left hip    Relevant Orders    XR hip/pelv 1 vw left if performed         Subjective:      Patient ID: Sam Peacock is a 68 y.o. female.    HPI    The following portions of the patient's history were reviewed and updated as appropriate:   Past Medical History:  She has a past medical history of Arthritis, Cancer (HCC) (03/30/2016), Chronic kidney disease, Diabetes mellitus (HCC), Diabetes type 2, controlled (HCC), Fallopian tube disorder, Hypertension, Kidney cysts, Multiple thyroid nodules, and Stage 3 chronic kidney disease (HCC).,  _______________________________________________________________________  Medical Problems:  does not have any pertinent problems on file.,  _______________________________________________________________________  Past Surgical History:   has a past surgical history that includes Wrist surgery (Bilateral); Tubal ligation (Right); and Kidney surgery (Right).,  _______________________________________________________________________  Family History:  family history includes BRCA1 Positive in her cousin; BRCA2 Positive in her cousin; Breast cancer  in her cousin; Diabetes in her maternal grandfather; Heart disease in her mother; Hypertension in her brother, father, and mother; Lung cancer in her brother.,  _______________________________________________________________________  Social History:   reports that she has been smoking cigarettes. She has never been exposed to tobacco smoke. She has never used smokeless tobacco. She reports current drug use. Drug: Marijuana. She reports that she does not drink alcohol.,  _______________________________________________________________________  Allergies:  is allergic to pollen extract..  _______________________________________________________________________  Current Outpatient Medications   Medication Sig Dispense Refill    allopurinol (ZYLOPRIM) 300 mg tablet take 1 tablet by mouth once daily (Patient taking differently: Take 300 mg by mouth daily Patient takes half a tablet.) 100 tablet 1    aspirin 81 mg chewable tablet Chew 81 mg in the morning      atenolol (TENORMIN) 50 mg tablet Take 1 tablet (50 mg total) by mouth every morning 90 tablet 3    atorvastatin (LIPITOR) 10 mg tablet TAKE 1 TABLET BY MOUTH IN THE MORNING 100 tablet 1    Cetirizine-Pseudoephedrine (ZYRTEC-D ALLERGY & CONGESTION PO) as needed      cholecalciferol (VITAMIN D3) 25 mcg (1,000 units) tablet 2,000 Units daily      DULoxetine (CYMBALTA) 60 mg delayed release capsule take 1 capsule by mouth once daily 30 capsule 5    fluticasone (FLONASE) 50 mcg/act nasal spray 2 sprays into each nostril daily 51 mL 3    glucose blood (OneTouch Ultra Test) test strip TEST 3 TIMES A DAY  AS DIRECTED 100 strip 1    Insulin Pen Needle (Droplet Pen Needles) 32G X 4 MM MISC USE 3 TIMES A DAY AS DIRECTED 100 each 2    Lancets (OneTouch Delica Plus Zccbio62P) MISC TEST BLOOD SUGARS ONCE A  each 1    liraglutide (Victoza) injection inject 0.6 milligram subcutaneously daily 6 mL 1    Omega-3 Fatty Acids (fish oil) 1,000 mg       Calcium Carbonate-Vit D-Min  "(CALCIUM 1200 PO) Take 1,200 mg by mouth daily (Patient not taking: Reported on 4/22/2024)      cyclobenzaprine (FLEXERIL) 10 mg tablet Take 1 tablet (10 mg total) by mouth daily at bedtime (Patient not taking: Reported on 4/22/2024) 90 tablet 3     No current facility-administered medications for this visit.     _______________________________________________________________________  Review of Systems      Objective:  Vitals:    08/26/24 1129   BP: 124/86   Pulse: 78   Temp: 98.4 °F (36.9 °C)   SpO2: 96%   Weight: 66.7 kg (147 lb)   Height: 5' 3\" (1.6 m)     Body mass index is 26.04 kg/m².     Physical Exam    "

## 2024-09-07 DIAGNOSIS — E11.22 TYPE 2 DIABETES MELLITUS WITH STAGE 3A CHRONIC KIDNEY DISEASE, WITHOUT LONG-TERM CURRENT USE OF INSULIN (HCC): ICD-10-CM

## 2024-09-07 DIAGNOSIS — N18.31 TYPE 2 DIABETES MELLITUS WITH STAGE 3A CHRONIC KIDNEY DISEASE, WITHOUT LONG-TERM CURRENT USE OF INSULIN (HCC): ICD-10-CM

## 2024-09-08 RX ORDER — LIRAGLUTIDE 6 MG/ML
INJECTION SUBCUTANEOUS
Qty: 6 ML | Refills: 1 | Status: SHIPPED | OUTPATIENT
Start: 2024-09-08

## 2024-09-20 ENCOUNTER — HOSPITAL ENCOUNTER (OUTPATIENT)
Dept: RADIOLOGY | Facility: HOSPITAL | Age: 69
End: 2024-09-20
Payer: COMMERCIAL

## 2024-09-20 DIAGNOSIS — M25.552 PAIN OF LEFT HIP: ICD-10-CM

## 2024-09-20 PROCEDURE — 73502 X-RAY EXAM HIP UNI 2-3 VIEWS: CPT

## 2024-09-27 ENCOUNTER — TELEPHONE (OUTPATIENT)
Age: 69
End: 2024-09-27

## 2024-09-27 NOTE — TELEPHONE ENCOUNTER
Patient called, she has saw her xray result message in her MyChart.     She wanted to ask if there is something she should be doing? She would like a call back with what her provider recommends.     Please advise, thank you.

## 2024-09-30 NOTE — TELEPHONE ENCOUNTER
Called and spoke with patient. She expressed verbal understanding. She stated she would like to have some time to think about it then she will call the office and let us know what she decides from there.

## 2024-10-08 DIAGNOSIS — E04.1 THYROID NODULE: Primary | ICD-10-CM

## 2024-10-12 DIAGNOSIS — M25.552 PAIN OF LEFT HIP: Primary | ICD-10-CM

## 2024-10-29 ENCOUNTER — OFFICE VISIT (OUTPATIENT)
Dept: OBGYN CLINIC | Facility: CLINIC | Age: 69
End: 2024-10-29
Payer: COMMERCIAL

## 2024-10-29 ENCOUNTER — PATIENT MESSAGE (OUTPATIENT)
Dept: RADIOLOGY | Facility: CLINIC | Age: 69
End: 2024-10-29

## 2024-10-29 VITALS
WEIGHT: 146 LBS | HEIGHT: 63 IN | SYSTOLIC BLOOD PRESSURE: 156 MMHG | HEART RATE: 76 BPM | DIASTOLIC BLOOD PRESSURE: 94 MMHG | BODY MASS INDEX: 25.87 KG/M2

## 2024-10-29 DIAGNOSIS — M16.12 PRIMARY OSTEOARTHRITIS OF ONE HIP, LEFT: Primary | ICD-10-CM

## 2024-10-29 DIAGNOSIS — M25.552 PAIN OF LEFT HIP: ICD-10-CM

## 2024-10-29 PROCEDURE — 99204 OFFICE O/P NEW MOD 45 MIN: CPT | Performed by: ORTHOPAEDIC SURGERY

## 2024-10-29 NOTE — PATIENT INSTRUCTIONS
Discussed conservative treatment and surgical intervention with patient at length  Weight bearing as tolerated left lower extremity  Order placed in system for left hip injection under fluoroscopy   Begin physical therapy as directed   Over the counter analgesics as needed / directed   Ice / heat as directed   Follow up 6 weeks after cortisone injection

## 2024-10-29 NOTE — PROGRESS NOTES
Orthopaedics Office Visit - 1st Patient Visit    ASSESSMENT/PLAN:    Assessment:   Left hip osteoarthritis  - chronic - exacerbated in recent year  Left trochanteric bursitis       Plan:   Discussed conservative treatment and surgical intervention with patient at length  Weight bearing as tolerated left lower extremity  Order placed in system for left hip injection under fluoroscopy   Begin physical therapy as directed   Over the counter analgesics as needed / directed   Ice / heat as directed   Follow up 6 weeks after cortisone injection       To Do Next Visit:  Evaluate left hip pain   _____________________________________________________  CHIEF COMPLAINT:  Chief Complaint   Patient presents with    Left Hip - Pain         SUBJECTIVE:  Sam Peacock is a 69 y.o. female who presents to the office for an initial evaluation for her left hip.  Patient states she has been explained the left hip pain ongoing for the past 2 years in duration with no injury of note.  Patient was previously diagnosed with left trochanteric bursitis and was treated with cortisone injections in the past.  Patient states that her pain at that time was more lateral in nature.  Patient states that over the past 16 months in duration she has been experiencing more posterior hip pain that becomes worse direct contact.  Patient does note pain associated with rotation of the hip.  Patient unable to take NSAIDs secondary to solitary kidney.  patient states that she does have pain radiating in the posterior aspect of her leg that does not extend past her knee.  Patient offers no other complaints at this time.      PAST MEDICAL HISTORY:  Past Medical History:   Diagnosis Date    Arthritis     Cancer (HCC) 03/30/2016    Rt Kidney Removed    Chronic kidney disease     Diabetes mellitus (HCC)     Diabetes type 2, controlled (HCC)     Fallopian tube disorder     fills with fluid    Hypertension     Kidney cysts     Multiple thyroid nodules     Stage 3  chronic kidney disease (HCC)        PAST SURGICAL HISTORY:  Past Surgical History:   Procedure Laterality Date    KIDNEY SURGERY Right     removal    TUBAL LIGATION Right     WRIST SURGERY Bilateral     basal joint soft tissue interpositional arthroplast L thumb 04/22 , 01/17/2024       FAMILY HISTORY:  Family History   Problem Relation Age of Onset    Heart disease Mother     Hypertension Mother     Hypertension Father     Diabetes Maternal Grandfather     Lung cancer Brother     Hypertension Brother     BRCA2 Positive Cousin     BRCA1 Positive Cousin     Breast cancer Cousin         maternal       SOCIAL HISTORY:  Social History     Tobacco Use    Smoking status: Some Days     Types: Cigarettes     Passive exposure: Never    Smokeless tobacco: Never   Vaping Use    Vaping status: Former    Substances: THC   Substance Use Topics    Alcohol use: Never    Drug use: Yes     Types: Marijuana       MEDICATIONS:    Current Outpatient Medications:     allopurinol (ZYLOPRIM) 300 mg tablet, take 1 tablet by mouth once daily (Patient taking differently: Take 300 mg by mouth daily Patient takes half a tablet.), Disp: 100 tablet, Rfl: 1    aspirin 81 mg chewable tablet, Chew 81 mg in the morning, Disp: , Rfl:     atenolol (TENORMIN) 50 mg tablet, Take 1 tablet (50 mg total) by mouth every morning, Disp: 90 tablet, Rfl: 3    atorvastatin (LIPITOR) 10 mg tablet, TAKE 1 TABLET BY MOUTH IN THE MORNING, Disp: 100 tablet, Rfl: 1    Cetirizine-Pseudoephedrine (ZYRTEC-D ALLERGY & CONGESTION PO), as needed, Disp: , Rfl:     cholecalciferol (VITAMIN D3) 25 mcg (1,000 units) tablet, 2,000 Units daily, Disp: , Rfl:     DULoxetine (CYMBALTA) 60 mg delayed release capsule, take 1 capsule by mouth once daily, Disp: 30 capsule, Rfl: 5    fluticasone (FLONASE) 50 mcg/act nasal spray, 2 sprays into each nostril daily, Disp: 51 mL, Rfl: 3    glucose blood (OneTouch Ultra Test) test strip, TEST 3 TIMES A DAY  AS DIRECTED, Disp: 100 strip, Rfl:  "1    Insulin Pen Needle (Droplet Pen Needles) 32G X 4 MM MISC, USE 3 TIMES A DAY AS DIRECTED, Disp: 100 each, Rfl: 2    Lancets (OneTouch Delica Plus [id number]) MISC, TEST BLOOD SUGARS ONCE A DAY, Disp: 100 each, Rfl: 1    Omega-3 Fatty Acids (fish oil) 1,000 mg, , Disp: , Rfl:     Victoza injection, inject 0.6 milligram subcutaneously daily, Disp: 6 mL, Rfl: 1    Calcium Carbonate-Vit D-Min (CALCIUM 1200 PO), Take 1,200 mg by mouth daily (Patient not taking: Reported on 4/22/2024), Disp: , Rfl:     cyclobenzaprine (FLEXERIL) 10 mg tablet, Take 1 tablet (10 mg total) by mouth daily at bedtime (Patient not taking: Reported on 4/22/2024), Disp: 90 tablet, Rfl: 3    ALLERGIES:  Allergies   Allergen Reactions    Pollen Extract Sneezing       LABS:  HgA1c:   Lab Results   Component Value Date    HGBA1C 5.8 (H) 08/22/2024     BMP:   Lab Results   Component Value Date    CALCIUM 10.4 (H) 08/22/2024    K 5.5 (H) 08/22/2024    CO2 30 08/22/2024     08/22/2024    BUN 20 08/22/2024    CREATININE 1.15 08/22/2024     CBC: No components found for: \"CBC\"    _____________________________________________________  PHYSICAL EXAMINATION:  Vital signs: /94   Pulse 76   Ht 5' 3\" (1.6 m)   Wt 66.2 kg (146 lb)   BMI 25.86 kg/m²   General: No acute distress, awake and alert  Psychiatric: Mood and affect appear appropriate  HEENT: Trachea Midline, No torticollis, no apparent facial trauma  Cardiovascular: No audible murmurs; Extremities appear perfused  Pulmonary: No audible wheezing or stridor  Skin: No open lesions; see further details (if any) below      MUSCULOSKELETAL EXAMINATION:  Left hip examination :  Patient sitting comfortably in the office in no apparent distress   No acute visible abnormalities present in the left leg.  No bony or soft tissue tenderness palpation noted at this time.  Pain associated with range of motion of the hip in particular with internal and external rotation of the hip  NV " "intact    _____________________________________________________  STUDIES REVIEWED:  I personally reviewed the images obtained on 9/20/24 and my independent interpretation is as follows:  Left hip XR 2 views :  There is joint space narrowing and acetabular sclerosis as well as mild remodeling and osteophytosis. There is also mild remodeling of the femoral head with mild osteophytosis.         PROCEDURES PERFORMED:  No procedures were performed at this time.               Brian Olvera PA-C - assisting  Lavon Patton MD                                  Portions of the record may have been created with voice recognition software.  Occasional wrong word or \"sound a like\" substitutions may have occurred due to the inherent limitations of voice recognition software.  Read the chart carefully and recognize, using context, where substitutions have occurred.    "

## 2024-10-29 NOTE — PATIENT COMMUNICATION
Pt is scheduled for hip injection with Dr Stanford, 11/27/24 - ordered by Dr Patton    Pt is diabetic, but per chart, does not wear a glucose monitor    Pt sent instructions via myc message, as she was driving during scheduling     Have you completed PT/HEP/Chiro in the past 6 months for dedicated area? Pt given PT referral at appt with Dr Patton on 10/29/24, results TBD   If yes, how long did you complete?  What was the frequency?  Did it provide relief?  If no, reason therapy was not completed?

## 2024-11-15 ENCOUNTER — OFFICE VISIT (OUTPATIENT)
Dept: FAMILY MEDICINE CLINIC | Facility: CLINIC | Age: 69
End: 2024-11-15
Payer: COMMERCIAL

## 2024-11-15 VITALS
WEIGHT: 143 LBS | HEART RATE: 83 BPM | SYSTOLIC BLOOD PRESSURE: 132 MMHG | DIASTOLIC BLOOD PRESSURE: 78 MMHG | OXYGEN SATURATION: 98 % | BODY MASS INDEX: 25.33 KG/M2 | TEMPERATURE: 98.5 F

## 2024-11-15 DIAGNOSIS — M54.2 NECK PAIN: ICD-10-CM

## 2024-11-15 DIAGNOSIS — M65.4 DE QUERVAIN'S TENOSYNOVITIS, BILATERAL: ICD-10-CM

## 2024-11-15 DIAGNOSIS — R10.11 RUQ PAIN: Primary | ICD-10-CM

## 2024-11-15 DIAGNOSIS — I10 PRIMARY HYPERTENSION: ICD-10-CM

## 2024-11-15 PROCEDURE — 99214 OFFICE O/P EST MOD 30 MIN: CPT

## 2024-11-15 NOTE — PROGRESS NOTES
Name: Sam Peacock      : 1955      MRN: [id number]  Encounter Provider: Gayatri Mejia PA-C  Encounter Date: 11/15/2024   Encounter department: [facility] [address]  :  Assessment & Plan  RUQ pain  -RUQ sharp pain that is intermittent, triggered by eating x 1 week  -No nausea, vomiting, diarrhea, fever, change in stools  -Positive hollingsworth sign on exam  -No hx of gallstones   -Ordered RUQ US  -Ordered cbc. Cmp, and lipase     Orders:    CBC and differential; Future    Comprehensive metabolic panel; Future    Lipase; Future    US right upper quadrant; Future    De Quervain's tenosynovitis, bilateral  -Hx of bilateral de quervain's tenosynovitis   -Used to follow with ortho at [facility] with Cortisone shots. Requesting new referral  -Referral to ortho placed  -Continue using wrist brace at night    Orders:    Ambulatory Referral to Orthopedic Surgery; Future    Neck pain  -Neck pain since August that has been contributing to headaches  -Originally believed HA were related to BP, has since gotten BP under control   -Tenderness in cervical region on exam  -No decreased ROM  -Ordered XR of spine cervical  -Ordered referral to PT  -Continue supportive measures at home for HA/neck pain relief, tylenol/ibuprofen  -Was Rx Flexeril in August and this was not very helpful     Orders:    XR spine cervical 1 view; Future    Ambulatory Referral to Physical Therapy; Future    Primary hypertension  -/78 in office  -Continue taking atenolol 50mg QD              History of Present Illness     HPI    Pain off/on for last week in RUQ. Worse after meals. She states it is a sharp pain. She has not taken anything for the pain. Denies nausea, vomiting, diarrhea, blood in stool. Denies hx of gb stones or issues. Denies high fat diet.     She has hx of dequevarian tenosynovitis. She was seeing ortho at [facility], but the Dr passed away. She is requesting new referral. She was getting  cortisone shots through ortho. She uses braces as needed.     She states she has had headaches since August. Her BP has been good since.     Review of Systems   Constitutional:  Negative for activity change, appetite change, fatigue and fever.   HENT:  Negative for congestion, ear pain, rhinorrhea and sore throat.    Eyes:  Negative for pain.   Respiratory:  Negative for cough and shortness of breath.    Cardiovascular:  Negative for chest pain and leg swelling.   Gastrointestinal:  Positive for abdominal pain. Negative for abdominal distention, constipation, diarrhea, nausea and vomiting.   Genitourinary:  Negative for dysuria, frequency and urgency.   Musculoskeletal:  Negative for gait problem.   Skin:  Negative for rash.   Neurological:  Positive for headaches. Negative for dizziness and light-headedness.            Objective   /78   Pulse 83   Temp 98.5 °F (36.9 °C)   Wt 64.9 kg (143 lb)   SpO2 98%   BMI 25.33 kg/m²      Physical Exam  Vitals reviewed.   Constitutional:       General: She is not in acute distress.     Appearance: Normal appearance.   HENT:      Head: Normocephalic and atraumatic.      Right Ear: External ear normal.      Left Ear: External ear normal.      Nose: Nose normal.      Mouth/Throat:      Mouth: Mucous membranes are moist.   Eyes:      Extraocular Movements: Extraocular movements intact.      Conjunctiva/sclera: Conjunctivae normal.      Pupils: Pupils are equal, round, and reactive to light.   Cardiovascular:      Rate and Rhythm: Normal rate and regular rhythm.      Heart sounds: Normal heart sounds.   Pulmonary:      Effort: Pulmonary effort is normal.      Breath sounds: Normal breath sounds.   Abdominal:      General: Bowel sounds are normal. There is no distension.      Palpations: Abdomen is soft.      Tenderness: There is abdominal tenderness in the right upper quadrant. Positive signs include Valenzuela's sign.   Musculoskeletal:      Right wrist: Tenderness present.       Left wrist: Normal.      Left hand: Normal.      Cervical back: Neck supple. Tenderness present.      Thoracic back: Normal.      Lumbar back: Normal.      Right lower leg: No edema.      Left lower leg: No edema.      Comments: +Right sided Finkelstein   Lymphadenopathy:      Cervical: No cervical adenopathy.   Skin:     General: Skin is warm.      Capillary Refill: Capillary refill takes less than 2 seconds.      Findings: No rash.   Neurological:      Mental Status: She is alert. Mental status is at baseline.             Gayatri Mejia PA-C  [facility]  11/15/2024 8:24 AM

## 2024-11-21 ENCOUNTER — TELEPHONE (OUTPATIENT)
Age: 69
End: 2024-11-21

## 2024-11-21 NOTE — TELEPHONE ENCOUNTER
Pt called to confirm the x-ray cervical spine was ordered.     Pt aware the x-ray order is in her chart

## 2024-11-22 DIAGNOSIS — N18.31 TYPE 2 DIABETES MELLITUS WITH STAGE 3A CHRONIC KIDNEY DISEASE, WITHOUT LONG-TERM CURRENT USE OF INSULIN (HCC): ICD-10-CM

## 2024-11-22 DIAGNOSIS — E11.22 TYPE 2 DIABETES MELLITUS WITH STAGE 3A CHRONIC KIDNEY DISEASE, WITHOUT LONG-TERM CURRENT USE OF INSULIN (HCC): ICD-10-CM

## 2024-11-22 RX ORDER — LIRAGLUTIDE 6 MG/ML
INJECTION SUBCUTANEOUS
Qty: 6 ML | Refills: 1 | Status: SHIPPED | OUTPATIENT
Start: 2024-11-22

## 2024-11-25 ENCOUNTER — HOSPITAL ENCOUNTER (OUTPATIENT)
Dept: RADIOLOGY | Facility: HOSPITAL | Age: 69
Discharge: HOME | End: 2024-11-25
Payer: COMMERCIAL

## 2024-11-25 DIAGNOSIS — M54.2 NECK PAIN: ICD-10-CM

## 2024-11-25 PROCEDURE — 72040 X-RAY EXAM NECK SPINE 2-3 VW: CPT

## 2024-11-27 ENCOUNTER — HOSPITAL ENCOUNTER (OUTPATIENT)
Dept: RADIOLOGY | Facility: CLINIC | Age: 69
Discharge: HOME | End: 2024-11-27
Admitting: STUDENT IN AN ORGANIZED HEALTH CARE EDUCATION/TRAINING PROGRAM
Payer: COMMERCIAL

## 2024-11-27 VITALS
OXYGEN SATURATION: 98 % | RESPIRATION RATE: 18 BRPM | DIASTOLIC BLOOD PRESSURE: 86 MMHG | SYSTOLIC BLOOD PRESSURE: 147 MMHG | HEART RATE: 76 BPM

## 2024-11-27 DIAGNOSIS — M16.12 PRIMARY OSTEOARTHRITIS OF ONE HIP, LEFT: ICD-10-CM

## 2024-11-27 PROCEDURE — 20610 DRAIN/INJ JOINT/BURSA W/O US: CPT | Performed by: STUDENT IN AN ORGANIZED HEALTH CARE EDUCATION/TRAINING PROGRAM

## 2024-11-27 PROCEDURE — 77002 NEEDLE LOCALIZATION BY XRAY: CPT

## 2024-11-27 PROCEDURE — 77002 NEEDLE LOCALIZATION BY XRAY: CPT | Performed by: STUDENT IN AN ORGANIZED HEALTH CARE EDUCATION/TRAINING PROGRAM

## 2024-11-27 RX ORDER — METHYLPREDNISOLONE ACETATE 80 MG/ML
80 INJECTION, SUSPENSION INTRA-ARTICULAR; INTRALESIONAL; INTRAMUSCULAR; PARENTERAL; SOFT TISSUE ONCE
Status: COMPLETED | OUTPATIENT
Start: 2024-11-27 | End: 2024-11-27

## 2024-11-27 RX ORDER — ROPIVACAINE HYDROCHLORIDE 2 MG/ML
4 INJECTION, SOLUTION EPIDURAL; INFILTRATION; PERINEURAL ONCE
Status: COMPLETED | OUTPATIENT
Start: 2024-11-27 | End: 2024-11-27

## 2024-11-27 RX ADMIN — METHYLPREDNISOLONE ACETATE 80 MG: 80 INJECTION, SUSPENSION INTRA-ARTICULAR; INTRALESIONAL; INTRAMUSCULAR; SOFT TISSUE at 10:17

## 2024-11-27 RX ADMIN — IOHEXOL 1 ML: 300 INJECTION, SOLUTION INTRAVENOUS at 10:16

## 2024-11-27 RX ADMIN — ROPIVACAINE HYDROCHLORIDE 4 ML: 2 INJECTION, SOLUTION EPIDURAL; INFILTRATION at 10:17

## 2024-11-27 NOTE — DISCHARGE INSTR - LAB

## 2024-11-27 NOTE — H&P
History of Present Illness: The patient is a 69 y.o. female who presents with complaints of left hip pain    Past Medical History:   Diagnosis Date    Arthritis     Cancer (HCC) 03/30/2016    Rt Kidney Removed    Chronic kidney disease     Diabetes mellitus (HCC)     Diabetes type 2, controlled (HCC)     Fallopian tube disorder     fills with fluid    Hypertension     Kidney cysts     Multiple thyroid nodules     Stage 3 chronic kidney disease (HCC)        Past Surgical History:   Procedure Laterality Date    KIDNEY SURGERY Right     removal    TUBAL LIGATION Right     WRIST SURGERY Bilateral     basal joint soft tissue interpositional arthroplast L thumb 04/22 , 01/17/2024         Current Outpatient Medications:     allopurinol (ZYLOPRIM) 300 mg tablet, take 1 tablet by mouth once daily, Disp: 100 tablet, Rfl: 1    aspirin 81 mg chewable tablet, Chew 81 mg in the morning, Disp: , Rfl:     atenolol (TENORMIN) 50 mg tablet, Take 1 tablet (50 mg total) by mouth every morning, Disp: 90 tablet, Rfl: 3    atorvastatin (LIPITOR) 10 mg tablet, TAKE 1 TABLET BY MOUTH IN THE MORNING, Disp: 100 tablet, Rfl: 1    Calcium Carbonate-Vit D-Min (CALCIUM 1200 PO), Take 1,200 mg by mouth daily (Patient not taking: Reported on 4/22/2024), Disp: , Rfl:     Cetirizine-Pseudoephedrine (ZYRTEC-D ALLERGY & CONGESTION PO), as needed, Disp: , Rfl:     cholecalciferol (VITAMIN D3) 25 mcg (1,000 units) tablet, 2,000 Units daily, Disp: , Rfl:     cyclobenzaprine (FLEXERIL) 10 mg tablet, Take 1 tablet (10 mg total) by mouth daily at bedtime (Patient not taking: Reported on 4/22/2024), Disp: 90 tablet, Rfl: 3    DULoxetine (CYMBALTA) 60 mg delayed release capsule, take 1 capsule by mouth once daily, Disp: 30 capsule, Rfl: 5    fluticasone (FLONASE) 50 mcg/act nasal spray, 2 sprays into each nostril daily, Disp: 51 mL, Rfl: 3    glucose blood (OneTouch Ultra Test) test strip, TEST 3 TIMES A DAY  AS DIRECTED, Disp: 100 strip, Rfl: 1    Insulin Pen  Needle (Droplet Pen Needles) 32G X 4 MM MISC, USE 3 TIMES A DAY AS DIRECTED, Disp: 100 each, Rfl: 2    Lancets (OneTouch Delica Plus [id number]) MISC, TEST BLOOD SUGARS ONCE A DAY, Disp: 100 each, Rfl: 1    liraglutide (VICTOZA) injection, inject 0.6 milligram subcutaneously daily, Disp: 6 mL, Rfl: 1    Omega-3 Fatty Acids (fish oil) 1,000 mg, , Disp: , Rfl:     Allergies   Allergen Reactions    Pollen Extract Sneezing       Physical Exam:   Vitals:    11/27/24 1004   BP: 132/94   Pulse: 71   Resp: 20   SpO2: 98%     General: Awake, Alert, Oriented x 3, Mood and affect appropriate  Respiratory: Respirations even and unlabored  Cardiovascular: Peripheral pulses intact; no edema  Musculoskeletal Exam: pain with left hip flexion    ASA Score: 3    Patient/Chart Verification  Patient ID Verified: Verbal  ID Band Applied: No  Consents Confirmed: To be obtained in the Pre-Procedure area  H&P( within 30 days) Verified: To be obtained in the Procedural area  Interval H&P(within 24 hr) Complete (required for Outpatients and Surgery Admit only): To be obtained in the Procedural area  Allergies Reviewed: Yes  Anticoag/NSAID held?: NA  Currently on antibiotics?: No  Pregnancy denied?: NA    Assessment:   1. Primary osteoarthritis of one hip, left        Plan: Left hip IA cortisone injection under fluoro

## 2024-11-29 ENCOUNTER — RESULTS FOLLOW-UP (OUTPATIENT)
Dept: FAMILY MEDICINE CLINIC | Facility: CLINIC | Age: 69
End: 2024-11-29

## 2024-12-07 ENCOUNTER — HOSPITAL ENCOUNTER (OUTPATIENT)
Dept: ULTRASOUND IMAGING | Facility: HOSPITAL | Age: 69
Discharge: HOME | End: 2024-12-07
Payer: COMMERCIAL

## 2024-12-07 ENCOUNTER — HOSPITAL ENCOUNTER (OUTPATIENT)
Dept: ULTRASOUND IMAGING | Facility: HOSPITAL | Age: 69
Discharge: HOME | End: 2024-12-07
Attending: FAMILY MEDICINE
Payer: COMMERCIAL

## 2024-12-07 DIAGNOSIS — E04.1 THYROID NODULE: ICD-10-CM

## 2024-12-07 DIAGNOSIS — R10.11 RUQ PAIN: ICD-10-CM

## 2024-12-07 PROCEDURE — 76536 US EXAM OF HEAD AND NECK: CPT

## 2024-12-07 PROCEDURE — 76705 ECHO EXAM OF ABDOMEN: CPT

## 2024-12-09 ENCOUNTER — RESULTS FOLLOW-UP (OUTPATIENT)
Dept: FAMILY MEDICINE CLINIC | Facility: CLINIC | Age: 69
End: 2024-12-09

## 2024-12-13 ENCOUNTER — RESULTS FOLLOW-UP (OUTPATIENT)
Dept: FAMILY MEDICINE CLINIC | Facility: CLINIC | Age: 69
End: 2024-12-13

## 2024-12-21 ENCOUNTER — OFFICE VISIT (OUTPATIENT)
Dept: URGENT CARE | Facility: CLINIC | Age: 69
End: 2024-12-21
Payer: COMMERCIAL

## 2024-12-21 VITALS
SYSTOLIC BLOOD PRESSURE: 132 MMHG | TEMPERATURE: 98 F | HEART RATE: 78 BPM | RESPIRATION RATE: 18 BRPM | OXYGEN SATURATION: 98 % | DIASTOLIC BLOOD PRESSURE: 84 MMHG | BODY MASS INDEX: 24.8 KG/M2 | WEIGHT: 140 LBS

## 2024-12-21 DIAGNOSIS — N18.31 TYPE 2 DIABETES MELLITUS WITH STAGE 3A CHRONIC KIDNEY DISEASE, WITHOUT LONG-TERM CURRENT USE OF INSULIN (HCC): ICD-10-CM

## 2024-12-21 DIAGNOSIS — E11.22 TYPE 2 DIABETES MELLITUS WITH STAGE 3A CHRONIC KIDNEY DISEASE, WITHOUT LONG-TERM CURRENT USE OF INSULIN (HCC): ICD-10-CM

## 2024-12-21 DIAGNOSIS — J02.9 VIRAL PHARYNGITIS: Primary | ICD-10-CM

## 2024-12-21 LAB — S PYO AG THROAT QL: NEGATIVE

## 2024-12-21 PROCEDURE — 99203 OFFICE O/P NEW LOW 30 MIN: CPT | Performed by: PHYSICIAN ASSISTANT

## 2024-12-21 PROCEDURE — 87880 STREP A ASSAY W/OPTIC: CPT | Performed by: PHYSICIAN ASSISTANT

## 2024-12-21 NOTE — PROGRESS NOTES
[facility]        NAME: Sam Peacock is a 69 y.o. female  : 1955    MRN: [id number]  DATE: 2024  TIME: 1:35 PM      Assessment and Plan     Viral pharyngitis [J02.9]  1. Viral pharyngitis  POCT rapid ANTIGEN strepA          POC Testing Results    Rapid strep -- negative    Note:   Likely viral -- patient to continue OTC medications/treatments as needed     Patient Instructions   There are no Patient Instructions on file for this visit.     Follow up with primary care provider.   Go to ER if symptoms worsen.    Chief Complaint     Chief Complaint   Patient presents with    Sore Throat     Sore throat for 2 days salt water, white spots in throat, ear pain started yesterday.          History of Present Illness     Patient presents with sore throat for 2 days and bilateral ear pain since yesterday.  She has gargled with salt water with mild relief        Review of Systems     Review of Systems   Constitutional:  Negative for chills, fatigue and fever.   HENT:  Positive for ear pain and sore throat. Negative for congestion, postnasal drip, rhinorrhea, sinus pressure and sinus pain.    Eyes:  Negative for pain and visual disturbance.   Respiratory:  Negative for cough, chest tightness and shortness of breath.    Cardiovascular:  Negative for chest pain and palpitations.   Gastrointestinal:  Negative for abdominal pain, diarrhea, nausea and vomiting.   Genitourinary:  Negative for dysuria and hematuria.   Musculoskeletal:  Negative for arthralgias, back pain and myalgias.   Skin:  Negative for rash.   Neurological:  Negative for dizziness, seizures, syncope, numbness and headaches.   All other systems reviewed and are negative.        Current Medications       Current Outpatient Medications:     allopurinol (ZYLOPRIM) 300 mg tablet, take 1 tablet by mouth once daily, Disp: 100 tablet, Rfl: 1    aspirin 81 mg chewable tablet, Chew 81 mg in the morning, Disp: , Rfl:     atenolol (TENORMIN)  50 mg tablet, Take 1 tablet (50 mg total) by mouth every morning, Disp: 90 tablet, Rfl: 3    atorvastatin (LIPITOR) 10 mg tablet, TAKE 1 TABLET BY MOUTH IN THE MORNING, Disp: 100 tablet, Rfl: 1    Cetirizine-Pseudoephedrine (ZYRTEC-D ALLERGY & CONGESTION PO), as needed, Disp: , Rfl:     cholecalciferol (VITAMIN D3) 25 mcg (1,000 units) tablet, 2,000 Units daily, Disp: , Rfl:     DULoxetine (CYMBALTA) 60 mg delayed release capsule, take 1 capsule by mouth once daily, Disp: 30 capsule, Rfl: 5    fluticasone (FLONASE) 50 mcg/act nasal spray, 2 sprays into each nostril daily, Disp: 51 mL, Rfl: 3    glucose blood (OneTouch Ultra Test) test strip, TEST 3 TIMES A DAY  AS DIRECTED, Disp: 100 strip, Rfl: 1    Insulin Pen Needle (Droplet Pen Needles) 32G X 4 MM MISC, USE 3 TIMES A DAY AS DIRECTED, Disp: 100 each, Rfl: 2    Lancets (OneTouch Delica Plus [id number]) MISC, TEST BLOOD SUGARS ONCE A DAY, Disp: 100 each, Rfl: 1    liraglutide (VICTOZA) injection, inject 0.6 milligram subcutaneously daily, Disp: 6 mL, Rfl: 1    Omega-3 Fatty Acids (fish oil) 1,000 mg, , Disp: , Rfl:     Calcium Carbonate-Vit D-Min (CALCIUM 1200 PO), Take 1,200 mg by mouth daily (Patient not taking: Reported on 4/22/2024), Disp: , Rfl:     cyclobenzaprine (FLEXERIL) 10 mg tablet, Take 1 tablet (10 mg total) by mouth daily at bedtime (Patient not taking: Reported on 4/22/2024), Disp: 90 tablet, Rfl: 3    Current Allergies     Allergies as of 12/21/2024 - Reviewed 12/21/2024   Allergen Reaction Noted    Nsaids GI Intolerance 12/29/2023    Pollen extract Sneezing 07/23/2024              The following portions of the patient's history were reviewed and updated as appropriate: allergies, current medications, past family history, past medical history, past social history, past surgical history, and problem list.     Past Medical History:   Diagnosis Date    Arthritis     Cancer (HCC) 03/30/2016    Rt Kidney Removed    Chronic kidney disease     Diabetes  mellitus (HCC)     Diabetes type 2, controlled (HCC)     Fallopian tube disorder     fills with fluid    Hypertension     Kidney cysts     Multiple thyroid nodules     Stage 3 chronic kidney disease (HCC)        Past Surgical History:   Procedure Laterality Date    KIDNEY SURGERY Right     removal    TUBAL LIGATION Right     WRIST SURGERY Bilateral     basal joint soft tissue interpositional arthroplast L thumb 04/22 , 01/17/2024       Family History   Problem Relation Age of Onset    Heart disease Mother     Hypertension Mother     Hypertension Father     Diabetes Maternal Grandfather     Lung cancer Brother     Hypertension Brother     BRCA2 Positive Cousin     BRCA1 Positive Cousin     Breast cancer Cousin         maternal         Medications have been verified.        Objective     /84   Pulse 78   Temp 98 °F (36.7 °C)   Resp 18   Wt 63.5 kg (140 lb)   SpO2 98%   BMI 24.80 kg/m²   No LMP recorded. Patient is postmenopausal.         Physical Exam     Physical Exam  Vitals and nursing note reviewed.   Constitutional:       Appearance: Normal appearance. She is normal weight.   HENT:      Head: Normocephalic and atraumatic.      Right Ear: Tympanic membrane, ear canal and external ear normal.      Left Ear: Tympanic membrane, ear canal and external ear normal.      Nose: Nose normal.      Mouth/Throat:      Mouth: Mucous membranes are moist.      Pharynx: Posterior oropharyngeal erythema (mild) present.      Comments: Tonsils unremarkable  Cardiovascular:      Rate and Rhythm: Normal rate and regular rhythm.      Heart sounds: Normal heart sounds.   Pulmonary:      Effort: Pulmonary effort is normal.      Breath sounds: Normal breath sounds.   Skin:     General: Skin is warm and dry.   Neurological:      General: No focal deficit present.      Mental Status: She is alert and oriented to person, place, and time.   Psychiatric:         Mood and Affect: Mood normal.         Behavior: Behavior normal.

## 2024-12-23 ENCOUNTER — OFFICE VISIT (OUTPATIENT)
Dept: FAMILY MEDICINE CLINIC | Facility: CLINIC | Age: 69
End: 2024-12-23
Payer: COMMERCIAL

## 2024-12-23 VITALS
WEIGHT: 139.8 LBS | BODY MASS INDEX: 24.77 KG/M2 | HEART RATE: 75 BPM | HEIGHT: 63 IN | DIASTOLIC BLOOD PRESSURE: 68 MMHG | TEMPERATURE: 97.4 F | OXYGEN SATURATION: 98 % | SYSTOLIC BLOOD PRESSURE: 112 MMHG

## 2024-12-23 DIAGNOSIS — I10 PRIMARY HYPERTENSION: ICD-10-CM

## 2024-12-23 DIAGNOSIS — R10.11 RUQ PAIN: ICD-10-CM

## 2024-12-23 DIAGNOSIS — J02.9 PHARYNGITIS, UNSPECIFIED ETIOLOGY: Primary | ICD-10-CM

## 2024-12-23 PROCEDURE — 99214 OFFICE O/P EST MOD 30 MIN: CPT

## 2024-12-23 RX ORDER — BLOOD SUGAR DIAGNOSTIC
STRIP MISCELLANEOUS 3 TIMES DAILY
Qty: 100 STRIP | Refills: 1 | Status: SHIPPED | OUTPATIENT
Start: 2024-12-23

## 2024-12-23 RX ORDER — AMOXICILLIN 500 MG/1
500 CAPSULE ORAL EVERY 12 HOURS SCHEDULED
Qty: 20 CAPSULE | Refills: 0 | Status: SHIPPED | OUTPATIENT
Start: 2024-12-23 | End: 2025-01-02

## 2024-12-23 RX ORDER — LANCETS 33 GAUGE
EACH MISCELLANEOUS
Qty: 100 EACH | Refills: 1 | Status: SHIPPED | OUTPATIENT
Start: 2024-12-23

## 2024-12-23 NOTE — PROGRESS NOTES
Name: Sam Peacock      : 1955      MRN: [id number]  Encounter Provider: Gayatri Mejia PA-C  Encounter Date: 2024   Encounter department: [facility] [address]  :  Assessment & Plan  Pharyngitis, unspecified etiology  -Extreme sore throat, ear pain, nausea x 5 days  -Denies cough, fever, sob, wheezing   -tested negative for strep on , symptoms have persisted  -Notes BF recently changed water bed mattress and is concerned symptoms are from water bed. PT did not ingest chemicals or water from the water bed. She slept on bed 2x and symptoms have persisted. BF at home did not have reaction to water bed  -On exam, throat is erythematous with white spots noted on tonsils  -Lungs cta b/l   -Going to treat suspected bacterial pharyngitis with amoxicillin 500mg bid x 10 days.   -Recommend continuing tylenol as needed, chloraseptic spray, and warm tea  -RTO if symptoms do not improve     Orders:    amoxicillin (AMOXIL) 500 mg capsule; Take 1 capsule (500 mg total) by mouth every 12 (twelve) hours for 10 days    RUQ pain  -was seen in office 11/15 for RUQ pain, US, CBC, CMP, lipase were all WNL  -She reports pain has since resolved  -Abdominal exam unremarkable               History of Present Illness     HPI    Erma reports to office for evaluation of sore throat. She was seen at urgent care 2024 and tested negative for strep. She notes 5 days of sore throat. She notes mattress smells like chemicals and her scratchy throat started the next morning. She denies drinking water from the water bed,     She notes it is painful to swallow. She notes ear pain on both sides as well. She notes she is nauseous.     She slept on water bed 2 nights. And symptoms are still present. She notes BF at home is sleeping on mattress and is totally okay.    She ntoes tylenol at home has not really been helping      Review of Systems   Constitutional:  Negative for activity change,  "appetite change, fatigue and fever.   HENT:  Positive for ear pain and sore throat. Negative for congestion, rhinorrhea, sinus pressure and sinus pain.    Eyes:  Negative for pain.   Respiratory:  Negative for cough, shortness of breath and wheezing.    Cardiovascular:  Negative for chest pain and leg swelling.   Gastrointestinal:  Positive for nausea. Negative for abdominal distention, abdominal pain, constipation, diarrhea and vomiting.   Genitourinary:  Negative for dysuria, frequency and urgency.   Musculoskeletal:  Negative for gait problem.   Skin:  Negative for rash.   Neurological:  Negative for dizziness, light-headedness and headaches.       Objective   /68   Pulse 75   Temp (!) 97.4 °F (36.3 °C) (Tympanic)   Ht 5' 3\" (1.6 m)   Wt 63.4 kg (139 lb 12.8 oz)   SpO2 98%   BMI 24.76 kg/m²      Physical Exam  Vitals reviewed.   Constitutional:       General: She is not in acute distress.     Appearance: Normal appearance.   HENT:      Head: Normocephalic and atraumatic.      Right Ear: Tympanic membrane, ear canal and external ear normal.      Left Ear: Tympanic membrane, ear canal and external ear normal.      Nose: Nose normal.      Right Sinus: No maxillary sinus tenderness or frontal sinus tenderness.      Left Sinus: No maxillary sinus tenderness or frontal sinus tenderness.      Mouth/Throat:      Mouth: Mucous membranes are moist.      Pharynx: Posterior oropharyngeal erythema present.      Comments: White exudates noted b/l tonsils  Eyes:      Extraocular Movements: Extraocular movements intact.      Conjunctiva/sclera: Conjunctivae normal.   Cardiovascular:      Rate and Rhythm: Normal rate and regular rhythm.      Heart sounds: Normal heart sounds.   Pulmonary:      Effort: Pulmonary effort is normal.      Breath sounds: Normal breath sounds.   Abdominal:      General: Bowel sounds are normal. There is no distension.      Palpations: Abdomen is soft.      Tenderness: There is no abdominal " tenderness.   Musculoskeletal:      Cervical back: Neck supple.      Right lower leg: No edema.      Left lower leg: No edema.   Lymphadenopathy:      Cervical: No cervical adenopathy.      Right cervical: No superficial cervical adenopathy.     Left cervical: No superficial cervical adenopathy.   Skin:     General: Skin is warm.      Capillary Refill: Capillary refill takes less than 2 seconds.      Findings: No rash.   Neurological:      Mental Status: She is alert. Mental status is at baseline.           Gayatri Mejia PA-C  [facility]  12/23/2024 2:54 PM

## 2024-12-24 RX ORDER — ATENOLOL 50 MG/1
50 TABLET ORAL EVERY MORNING
Qty: 90 TABLET | Refills: 1 | Status: SHIPPED | OUTPATIENT
Start: 2024-12-24

## 2025-01-10 ENCOUNTER — TELEPHONE (OUTPATIENT)
Dept: PAIN MEDICINE | Facility: CLINIC | Age: 70
End: 2025-01-10

## 2025-01-10 NOTE — TELEPHONE ENCOUNTER
Spoke to [facility] claims insurance [phone] insurance is still active and billable

## 2025-01-12 DIAGNOSIS — E79.0 HYPERURICEMIA: ICD-10-CM

## 2025-01-13 RX ORDER — ALLOPURINOL 300 MG/1
300 TABLET ORAL DAILY
Qty: 100 TABLET | Refills: 1 | Status: SHIPPED | OUTPATIENT
Start: 2025-01-13

## 2025-01-14 ENCOUNTER — OFFICE VISIT (OUTPATIENT)
Dept: OBGYN CLINIC | Facility: CLINIC | Age: 70
End: 2025-01-14

## 2025-01-14 VITALS — WEIGHT: 146.3 LBS | HEIGHT: 63 IN | BODY MASS INDEX: 25.92 KG/M2

## 2025-01-14 DIAGNOSIS — M54.41 CHRONIC BILATERAL LOW BACK PAIN WITH BILATERAL SCIATICA: ICD-10-CM

## 2025-01-14 DIAGNOSIS — M16.12 PRIMARY OSTEOARTHRITIS OF ONE HIP, LEFT: Primary | ICD-10-CM

## 2025-01-14 DIAGNOSIS — M54.42 CHRONIC BILATERAL LOW BACK PAIN WITH BILATERAL SCIATICA: ICD-10-CM

## 2025-01-14 DIAGNOSIS — G89.29 CHRONIC BILATERAL LOW BACK PAIN WITH BILATERAL SCIATICA: ICD-10-CM

## 2025-01-14 PROCEDURE — 99213 OFFICE O/P EST LOW 20 MIN: CPT | Performed by: ORTHOPAEDIC SURGERY

## 2025-01-14 NOTE — PROGRESS NOTES
Name: Sam Peacock      : 1955       MRN: [id number]   Encounter Provider: Lavon Patton MD   Encounter Date: 25  Encounter department: [facility] [place]     Assessment & Plan  Primary osteoarthritis of one hip, left  Patient is doing very well s/p right hip IA CSI with 80-90% improvement in symptoms  Patient referred to Dr. Stanford for her low back pain and radiating symptoms.   She was referred to outpatient physical therapy  OTC analgesics as needed for symptom management  Follow up 2-3 months  Orders:    Ambulatory Referral to Physical Therapy; Future    Chronic bilateral low back pain with bilateral sciatica    Orders:    Ambulatory referral to Spine & Pain Management; Future    Ambulatory Referral to Physical Therapy; Future      Plan:       To Do Next Visit:  Reevaluation left hip    _____________________________________________________  CHIEF COMPLAINT:  Chief Complaint   Patient presents with    Left Hip - Follow-up         SUBJECTIVE:  Sam Peacock is a 69 y.o. female who presents for follow up evaluation of left hip pain secondary to known osteoarthritis. The patient  is s/p left hip IA CSI by Dr. Stanford on 2024. Today, she reports her hip is doing very well. She is complaining of low back pain radiating into both lower extremities. Pain is managed with Tylenol. Her hip pain is 80-90% improved with the injection.     PAST MEDICAL HISTORY:  Past Medical History:   Diagnosis Date    Arthritis     Cancer (HCC) 2016    Rt Kidney Removed    Chronic kidney disease     Diabetes mellitus (HCC)     Diabetes type 2, controlled (HCC)     Fallopian tube disorder     fills with fluid    Hypertension     Kidney cysts     Multiple thyroid nodules     Osteoarthritis     Stage 3 chronic kidney disease (HCC)        PAST SURGICAL HISTORY:  Past Surgical History:   Procedure Laterality Date    KIDNEY SURGERY Right     removal    TUBAL LIGATION Right      WRIST SURGERY Bilateral     basal joint soft tissue interpositional arthroplast L thumb 04/22 , 01/17/2024       FAMILY HISTORY:  Family History   Problem Relation Age of Onset    Heart disease Mother     Hypertension Mother     Hypertension Father     Diabetes Maternal Grandfather     Lung cancer Brother     Hypertension Brother     BRCA2 Positive Cousin     BRCA1 Positive Cousin     Breast cancer Cousin         maternal       SOCIAL HISTORY:  Social History     Tobacco Use    Smoking status: Some Days     Current packs/day: 1.00     Average packs/day: 1 pack/day for 35.0 years (35.0 ttl pk-yrs)     Types: Cigarettes     Start date: 1990     Passive exposure: Never    Smokeless tobacco: Never   Vaping Use    Vaping status: Never Used   Substance Use Topics    Alcohol use: Never    Drug use: Yes     Types: Marijuana       MEDICATIONS:    Current Outpatient Medications:     allopurinol (ZYLOPRIM) 300 mg tablet, take 1 tablet by mouth once daily, Disp: 100 tablet, Rfl: 1    aspirin 81 mg chewable tablet, Chew 81 mg in the morning, Disp: , Rfl:     atenolol (TENORMIN) 50 mg tablet, take 1 tablet by mouth every morning, Disp: 90 tablet, Rfl: 1    atorvastatin (LIPITOR) 10 mg tablet, TAKE 1 TABLET BY MOUTH IN THE MORNING, Disp: 100 tablet, Rfl: 1    Cetirizine-Pseudoephedrine (ZYRTEC-D ALLERGY & CONGESTION PO), as needed, Disp: , Rfl:     cholecalciferol (VITAMIN D3) 25 mcg (1,000 units) tablet, 2,000 Units daily, Disp: , Rfl:     DULoxetine (CYMBALTA) 60 mg delayed release capsule, take 1 capsule by mouth once daily, Disp: 30 capsule, Rfl: 5    fluticasone (FLONASE) 50 mcg/act nasal spray, 2 sprays into each nostril daily, Disp: 51 mL, Rfl: 3    Insulin Pen Needle (Droplet Pen Needles) 32G X 4 MM MISC, USE 3 TIMES A DAY AS DIRECTED, Disp: 100 each, Rfl: 2    Lancets (OneTouch Delica Plus [id number]) MISC, use 1 LANCET to TEST BLOOD SUGAR once daily, Disp: 100 each, Rfl: 1    liraglutide (VICTOZA) injection, inject 0.6  "milligram subcutaneously daily, Disp: 6 mL, Rfl: 1    Omega-3 Fatty Acids (fish oil) 1,000 mg, , Disp: , Rfl:     OneTouch Ultra test strip, TEST 3 TIMES A DAY AS DIRECTED, Disp: 100 strip, Rfl: 1    Calcium Carbonate-Vit D-Min (CALCIUM 1200 PO), Take 1,200 mg by mouth daily (Patient not taking: Reported on 4/22/2024), Disp: , Rfl:     cyclobenzaprine (FLEXERIL) 10 mg tablet, Take 1 tablet (10 mg total) by mouth daily at bedtime (Patient not taking: Reported on 4/22/2024), Disp: 90 tablet, Rfl: 3    ALLERGIES:  Allergies   Allergen Reactions    Nsaids GI Intolerance     Chronic Kidney disease-right total nephrectomy    Pollen Extract Sneezing       LABS:  HgA1c:   Lab Results   Component Value Date    HGBA1C 5.8 (H) 08/22/2024     BMP:   Lab Results   Component Value Date    CALCIUM 10.4 (H) 08/22/2024    K 5.5 (H) 08/22/2024    CO2 30 08/22/2024     08/22/2024    BUN 20 08/22/2024    CREATININE 1.15 08/22/2024     CBC: No components found for: \"CBC\"    _____________________________________________________  PHYSICAL EXAMINATION:  Vital signs: Ht 5' 3\" (1.6 m)   Wt 66.4 kg (146 lb 4.8 oz)   BMI 25.92 kg/m²   General: No acute distress, awake and alert  Psychiatric: Mood and affect appear appropriate  HEENT: Trachea Midline, No torticollis, no apparent facial trauma  Cardiovascular: No audible murmurs; Extremities appear perfused  Pulmonary: No audible wheezing or stridor  Skin: No open lesions; see further details (if any) below    MUSCULOSKELETAL EXAMINATION:  Extremities:    Left hip examination :  Patient sitting comfortably in the office in no apparent distress   No acute visible abnormalities present in the left leg.  No bony or soft tissue tenderness palpation noted at this time.  No pain with hip range of motion today  NV intact    _____________________________________________________  STUDIES REVIEWED:  I personally reviewed the images obtained in office today and my independent interpretation is as " follows:  No new images today      PROCEDURES PERFORMED:  Procedures      Scribe Attestation      I,:  Telma Jin am acting as a scribe while in the presence of the attending physician.:       I,:  Lavon Patton MD personally performed the services described in this documentation    as scribed in my presence.:

## 2025-01-15 ENCOUNTER — TELEPHONE (OUTPATIENT)
Age: 70
End: 2025-01-15

## 2025-01-15 NOTE — TELEPHONE ENCOUNTER
Caller: Erma    Doctor: Pooja    Reason for call: patient gave the wrong fax number for the PT RX the correct number is below.  Please refax  Thank you    Call back#: [phone]    [phone]

## 2025-01-16 NOTE — ASSESSMENT & PLAN NOTE
Patient is doing very well s/p right hip IA CSI with 80-90% improvement in symptoms  Patient referred to Dr. Stanford for her low back pain and radiating symptoms.   She was referred to outpatient physical therapy  OTC analgesics as needed for symptom management  Follow up 2-3 months  Orders:    Ambulatory Referral to Physical Therapy; Future

## 2025-01-16 NOTE — ASSESSMENT & PLAN NOTE
Orders:    Ambulatory referral to Spine & Pain Management; Future    Ambulatory Referral to Physical Therapy; Future

## 2025-01-22 ENCOUNTER — APPOINTMENT (OUTPATIENT)
Dept: LAB | Facility: CLINIC | Age: 70
End: 2025-01-22
Payer: COMMERCIAL

## 2025-01-22 DIAGNOSIS — E78.5 HYPERLIPIDEMIA, UNSPECIFIED HYPERLIPIDEMIA TYPE: ICD-10-CM

## 2025-01-22 DIAGNOSIS — F32.A DEPRESSION, UNSPECIFIED DEPRESSION TYPE: ICD-10-CM

## 2025-01-22 DIAGNOSIS — E11.22 TYPE 2 DIABETES MELLITUS WITH STAGE 3B CHRONIC KIDNEY DISEASE, WITHOUT LONG-TERM CURRENT USE OF INSULIN (HCC): ICD-10-CM

## 2025-01-22 DIAGNOSIS — N18.32 TYPE 2 DIABETES MELLITUS WITH STAGE 3B CHRONIC KIDNEY DISEASE, WITHOUT LONG-TERM CURRENT USE OF INSULIN (HCC): ICD-10-CM

## 2025-01-22 LAB
ALBUMIN SERPL BCG-MCNC: 3.9 G/DL (ref 3.5–5)
ALP SERPL-CCNC: 70 U/L (ref 34–104)
ALT SERPL W P-5'-P-CCNC: 10 U/L (ref 7–52)
ANION GAP SERPL CALCULATED.3IONS-SCNC: 9 MMOL/L (ref 4–13)
AST SERPL W P-5'-P-CCNC: 13 U/L (ref 13–39)
BASOPHILS # BLD AUTO: 0.08 THOUSANDS/ΜL (ref 0–0.1)
BASOPHILS NFR BLD AUTO: 1 % (ref 0–1)
BILIRUB SERPL-MCNC: 0.36 MG/DL (ref 0.2–1)
BUN SERPL-MCNC: 19 MG/DL (ref 5–25)
CALCIUM SERPL-MCNC: 9.9 MG/DL (ref 8.4–10.2)
CHLORIDE SERPL-SCNC: 101 MMOL/L (ref 96–108)
CHOLEST SERPL-MCNC: 189 MG/DL (ref ?–200)
CO2 SERPL-SCNC: 27 MMOL/L (ref 21–32)
CREAT SERPL-MCNC: 1.15 MG/DL (ref 0.6–1.3)
EOSINOPHIL # BLD AUTO: 0.4 THOUSAND/ΜL (ref 0–0.61)
EOSINOPHIL NFR BLD AUTO: 4 % (ref 0–6)
ERYTHROCYTE [DISTWIDTH] IN BLOOD BY AUTOMATED COUNT: 17.5 % (ref 11.6–15.1)
EST. AVERAGE GLUCOSE BLD GHB EST-MCNC: 123 MG/DL
GFR SERPL CREATININE-BSD FRML MDRD: 48 ML/MIN/1.73SQ M
GLUCOSE P FAST SERPL-MCNC: 104 MG/DL (ref 65–99)
HBA1C MFR BLD: 5.9 %
HCT VFR BLD AUTO: 41.8 % (ref 34.8–46.1)
HDLC SERPL-MCNC: 51 MG/DL
HGB BLD-MCNC: 12.8 G/DL (ref 11.5–15.4)
IMM GRANULOCYTES # BLD AUTO: 0.05 THOUSAND/UL (ref 0–0.2)
IMM GRANULOCYTES NFR BLD AUTO: 1 % (ref 0–2)
LDLC SERPL CALC-MCNC: 118 MG/DL (ref 0–100)
LYMPHOCYTES # BLD AUTO: 2.49 THOUSANDS/ΜL (ref 0.6–4.47)
LYMPHOCYTES NFR BLD AUTO: 24 % (ref 14–44)
MCH RBC QN AUTO: 29.2 PG (ref 26.8–34.3)
MCHC RBC AUTO-ENTMCNC: 30.6 G/DL (ref 31.4–37.4)
MCV RBC AUTO: 95 FL (ref 82–98)
MONOCYTES # BLD AUTO: 0.73 THOUSAND/ΜL (ref 0.17–1.22)
MONOCYTES NFR BLD AUTO: 7 % (ref 4–12)
NEUTROPHILS # BLD AUTO: 6.65 THOUSANDS/ΜL (ref 1.85–7.62)
NEUTS SEG NFR BLD AUTO: 63 % (ref 43–75)
NRBC BLD AUTO-RTO: 0 /100 WBCS
PLATELET # BLD AUTO: 370 THOUSANDS/UL (ref 149–390)
PMV BLD AUTO: 12.4 FL (ref 8.9–12.7)
POTASSIUM SERPL-SCNC: 4.7 MMOL/L (ref 3.5–5.3)
PROT SERPL-MCNC: 6.9 G/DL (ref 6.4–8.4)
RBC # BLD AUTO: 4.39 MILLION/UL (ref 3.81–5.12)
SODIUM SERPL-SCNC: 137 MMOL/L (ref 135–147)
TRIGL SERPL-MCNC: 101 MG/DL (ref ?–150)
WBC # BLD AUTO: 10.4 THOUSAND/UL (ref 4.31–10.16)

## 2025-01-22 PROCEDURE — 85025 COMPLETE CBC W/AUTO DIFF WBC: CPT

## 2025-01-22 PROCEDURE — 83036 HEMOGLOBIN GLYCOSYLATED A1C: CPT

## 2025-01-22 PROCEDURE — 80053 COMPREHEN METABOLIC PANEL: CPT

## 2025-01-22 PROCEDURE — 80061 LIPID PANEL: CPT

## 2025-01-22 PROCEDURE — 36415 COLL VENOUS BLD VENIPUNCTURE: CPT

## 2025-01-22 RX ORDER — ATORVASTATIN CALCIUM 10 MG/1
10 TABLET, FILM COATED ORAL EVERY MORNING
Qty: 100 TABLET | Refills: 1 | Status: SHIPPED | OUTPATIENT
Start: 2025-01-22

## 2025-01-22 RX ORDER — DULOXETIN HYDROCHLORIDE 60 MG/1
60 CAPSULE, DELAYED RELEASE ORAL DAILY
Qty: 30 CAPSULE | Refills: 5 | Status: SHIPPED | OUTPATIENT
Start: 2025-01-22

## 2025-01-22 NOTE — TELEPHONE ENCOUNTER
Reason for call:   [x] Refill   [] Prior Auth  [] Other:     Office:   [x] PCP/Provider - : Liang Milligan/ [facility] [address] [place] [address]   [] Specialty/Provider -     Medication: Lipitor    Dose/Frequency: 10 mg     Quantity: #90    Pharmacy: [facility]    Does the patient have enough for 3 days?   [] Yes   [x] No - Send as HP to POD

## 2025-01-28 ENCOUNTER — OFFICE VISIT (OUTPATIENT)
Dept: FAMILY MEDICINE CLINIC | Facility: CLINIC | Age: 70
End: 2025-01-28
Payer: COMMERCIAL

## 2025-01-28 VITALS
SYSTOLIC BLOOD PRESSURE: 130 MMHG | OXYGEN SATURATION: 98 % | HEIGHT: 63 IN | HEART RATE: 82 BPM | WEIGHT: 143 LBS | BODY MASS INDEX: 25.34 KG/M2 | RESPIRATION RATE: 18 BRPM | DIASTOLIC BLOOD PRESSURE: 90 MMHG

## 2025-01-28 DIAGNOSIS — E11.22 TYPE 2 DIABETES MELLITUS WITH STAGE 3B CHRONIC KIDNEY DISEASE, WITHOUT LONG-TERM CURRENT USE OF INSULIN (HCC): ICD-10-CM

## 2025-01-28 DIAGNOSIS — N18.32 TYPE 2 DIABETES MELLITUS WITH STAGE 3B CHRONIC KIDNEY DISEASE, WITHOUT LONG-TERM CURRENT USE OF INSULIN (HCC): ICD-10-CM

## 2025-01-28 DIAGNOSIS — E78.5 HYPERLIPIDEMIA, UNSPECIFIED HYPERLIPIDEMIA TYPE: ICD-10-CM

## 2025-01-28 DIAGNOSIS — N18.31 STAGE 3A CHRONIC KIDNEY DISEASE (HCC): ICD-10-CM

## 2025-01-28 DIAGNOSIS — F33.9 DEPRESSION, RECURRENT (HCC): ICD-10-CM

## 2025-01-28 DIAGNOSIS — I10 PRIMARY HYPERTENSION: Primary | ICD-10-CM

## 2025-01-28 DIAGNOSIS — E79.0 HYPERURICEMIA: ICD-10-CM

## 2025-01-28 DIAGNOSIS — E55.9 VITAMIN D DEFICIENCY: ICD-10-CM

## 2025-01-28 PROCEDURE — 99214 OFFICE O/P EST MOD 30 MIN: CPT | Performed by: FAMILY MEDICINE

## 2025-01-28 NOTE — ASSESSMENT & PLAN NOTE
Continue allopurinol 300 mg daily   [General Appearance - Alert] : alert [General Appearance - In No Acute Distress] : in no acute distress [Auscultation Breath Sounds / Voice Sounds] : lungs were clear to auscultation bilaterally [Heart Sounds] : normal S1 and S2 [Musculoskeletal - Swelling] : no joint swelling seen [] : no rash [Impaired Insight] : insight and judgment were intact

## 2025-01-28 NOTE — PROGRESS NOTES
Depression Screening and Follow-up Plan: Patient was screened for depression during today's encounter. They screened negative with a PHQ-9 score of 3.    Tobacco Cessation Counseling: Tobacco cessation counseling was provided. The patient is sincerely urged to quit consumption of tobacco. She is not ready to quit tobacco.     Assessment/Plan:    Turn visit in 4 months with fasting blood prior to visit.     Problem List Items Addressed This Visit       Depression, recurrent (HCC)    Hyperlipidemia    Continue Lipitor 10 mg daily         Relevant Orders    CBC and Platelet    Lipid Panel with Direct LDL reflex    Hyperuricemia    Continue allopurinol 300 mg daily         Relevant Orders    Uric acid    Primary hypertension - Primary    Continue atenolol 50 mg daily.         Stage 3a chronic kidney disease (HCC)    Type 2 diabetes mellitus with chronic kidney disease, without long-term current use of insulin (HCC)    Continue toes 0.6 mg daily  Lab Results   Component Value Date    HGBA1C 5.9 (H) 01/22/2025            Relevant Orders    Hemoglobin A1C    Albumin / creatinine urine ratio    Vitamin D deficiency    Continue Cymbalta 60 mg daily         Relevant Orders    Vitamin D 25 hydroxy         Subjective:      Patient ID: Sam Peacock is a 69 y.o. female.    Patient comes in for checkup.  She is now taking generic Victoza and this is giving her right upper quadrant abdominal pain.    Diabetes        The following portions of the patient's history were reviewed and updated as appropriate:   Past Medical History:  She has a past medical history of Arthritis, Cancer (HCC) (03/30/2016), Chronic kidney disease, Diabetes mellitus (HCC), Diabetes type 2, controlled (HCC), Fallopian tube disorder, Hypertension, Kidney cysts, Multiple thyroid nodules, Osteoarthritis, and Stage 3 chronic kidney disease (HCC).,  _______________________________________________________________________  Medical Problems:  does not have any  pertinent problems on file.,  _______________________________________________________________________  Past Surgical History:   has a past surgical history that includes Wrist surgery (Bilateral); Tubal ligation (Right); and Kidney surgery (Right).,  _______________________________________________________________________  Family History:  family history includes BRCA1 Positive in her cousin; BRCA2 Positive in her cousin; Breast cancer in her cousin; Diabetes in her maternal grandfather; Heart disease in her mother; Hypertension in her brother, father, and mother; Lung cancer in her brother.,  _______________________________________________________________________  Social History:   reports that she has never smoked. She has never been exposed to tobacco smoke. She has never used smokeless tobacco. She reports current drug use. Drug: Marijuana. She reports that she does not drink alcohol.,  _______________________________________________________________________  Allergies:  is allergic to nsaids and pollen extract..  _______________________________________________________________________  Current Outpatient Medications   Medication Sig Dispense Refill    allopurinol (ZYLOPRIM) 300 mg tablet take 1 tablet by mouth once daily 100 tablet 1    aspirin 81 mg chewable tablet Chew 81 mg in the morning      atenolol (TENORMIN) 50 mg tablet take 1 tablet by mouth every morning 90 tablet 1    atorvastatin (LIPITOR) 10 mg tablet Take 1 tablet (10 mg total) by mouth every morning 100 tablet 1    Cetirizine-Pseudoephedrine (ZYRTEC-D ALLERGY & CONGESTION PO) as needed      cholecalciferol (VITAMIN D3) 25 mcg (1,000 units) tablet 2,000 Units daily      DULoxetine (CYMBALTA) 60 mg delayed release capsule take 1 capsule by mouth once daily 30 capsule 5    fluticasone (FLONASE) 50 mcg/act nasal spray 2 sprays into each nostril daily 51 mL 3    Insulin Pen Needle (Droplet Pen Needles) 32G X 4 MM MISC USE 3 TIMES A DAY AS DIRECTED 100  "each 2    Lancets (OneTouch Delica Plus [id number]) MISC use 1 LANCET to TEST BLOOD SUGAR once daily 100 each 1    liraglutide (VICTOZA) injection inject 0.6 milligram subcutaneously daily 6 mL 1    Omega-3 Fatty Acids (fish oil) 1,000 mg       OneTouch Ultra test strip TEST 3 TIMES A DAY AS DIRECTED 100 strip 1    Calcium Carbonate-Vit D-Min (CALCIUM 1200 PO) Take 1,200 mg by mouth daily (Patient not taking: Reported on 4/22/2024)       No current facility-administered medications for this visit.     _______________________________________________________________________  Review of Systems   Constitutional: Negative.    Respiratory: Negative.     Cardiovascular: Negative.    Gastrointestinal:  Positive for abdominal pain.         Objective:  Vitals:    01/28/25 1351   BP: 130/90   BP Location: Left arm   Patient Position: Sitting   Cuff Size: Standard   Pulse: 82   Resp: 18   SpO2: 98%   Weight: 64.9 kg (143 lb)   Height: 5' 3\" (1.6 m)     Body mass index is 25.33 kg/m².     Physical Exam  Constitutional:       Appearance: Normal appearance. She is well-developed.   HENT:      Head: Normocephalic and atraumatic.      Right Ear: External ear normal.      Left Ear: External ear normal.   Eyes:      Pupils: Pupils are equal, round, and reactive to light.   Neck:      Thyroid: No thyromegaly.   Cardiovascular:      Rate and Rhythm: Normal rate and regular rhythm.      Pulses: no weak pulses.           Dorsalis pedis pulses are 1+ on the right side and 1+ on the left side.        Posterior tibial pulses are 1+ on the right side and 1+ on the left side.      Heart sounds: Normal heart sounds.   Pulmonary:      Effort: Pulmonary effort is normal.      Breath sounds: Normal breath sounds.   Musculoskeletal:         General: Normal range of motion.      Cervical back: Neck supple.   Feet:      Right foot:      Skin integrity: No ulcer, skin breakdown, erythema, warmth, callus or dry skin.      Left foot:      Skin integrity: " No ulcer, skin breakdown, erythema, warmth, callus or dry skin.   Lymphadenopathy:      Cervical: No cervical adenopathy.   Skin:     General: Skin is warm and dry.   Neurological:      Mental Status: She is alert.   Psychiatric:         Mood and Affect: Mood normal.         Behavior: Behavior normal.       Diabetic Foot Exam    Patient's shoes and socks removed.    Right Foot/Ankle   Right Foot Inspection  Skin Exam: skin normal and skin intact. No dry skin, no warmth, no callus, no erythema, no maceration, no abnormal color, no pre-ulcer, no ulcer and no callus.     Toe Exam: ROM and strength within normal limits.     Sensory   Vibration: intact  Proprioception: intact  Monofilament testing: intact    Vascular  The right DP pulse is 1+. The right PT pulse is 1+.     Left Foot/Ankle  Left Foot Inspection  Skin Exam: skin normal and skin intact. No dry skin, no warmth, no erythema, no maceration, normal color, no pre-ulcer, no ulcer and no callus.     Toe Exam: ROM and strength within normal limits.     Sensory   Vibration: intact  Proprioception: intact  Monofilament testing: intact    Vascular  The left DP pulse is 1+. The left PT pulse is 1+.     Assign Risk Category  No deformity present  No loss of protective sensation  No weak pulses  Risk: 0

## 2025-02-04 ENCOUNTER — OFFICE VISIT (OUTPATIENT)
Dept: OBGYN CLINIC | Facility: CLINIC | Age: 70
End: 2025-02-04
Payer: MEDICARE

## 2025-02-04 ENCOUNTER — APPOINTMENT (OUTPATIENT)
Dept: RADIOLOGY | Facility: CLINIC | Age: 70
End: 2025-02-04
Payer: MEDICARE

## 2025-02-04 VITALS — BODY MASS INDEX: 25.34 KG/M2 | WEIGHT: 143 LBS | HEIGHT: 63 IN

## 2025-02-04 DIAGNOSIS — M65.4 DE QUERVAIN'S TENOSYNOVITIS, BILATERAL: ICD-10-CM

## 2025-02-04 DIAGNOSIS — M25.532 LEFT WRIST PAIN: Primary | ICD-10-CM

## 2025-02-04 DIAGNOSIS — M25.531 RIGHT WRIST PAIN: ICD-10-CM

## 2025-02-04 DIAGNOSIS — M25.532 LEFT WRIST PAIN: ICD-10-CM

## 2025-02-04 PROCEDURE — 73110 X-RAY EXAM OF WRIST: CPT

## 2025-02-04 PROCEDURE — 99213 OFFICE O/P EST LOW 20 MIN: CPT | Performed by: ORTHOPAEDIC SURGERY

## 2025-02-04 NOTE — PROGRESS NOTES
ASSESSMENT/PLAN:    Diagnoses and all orders for this visit:    Left wrist pain  -     XR wrist 3+ vw left; Future    De Quervain's tenosynovitis, bilateral  -     Ambulatory Referral to Orthopedic Surgery    Right wrist pain  -     XR wrist 3+ vw right; Future        The patient was seen and examined.  X-rays of the patient's bilateral wrist are negative for any fracture dislocations.  The patient has bilateral de Quervain's tenosynovitis.  Multiple treatment options were discussed with the patient.  At this point, she is asymptomatic.  She states she will follow-up with her office if she starts develop wrist pain again.  She denies any numbness or tingling.  She denies any fever or chills.    Return if symptoms worsen or fail to improve.      Patient has asymptomatic bilateral first dorsal compartment tendinitis.  There is prominent first dorsal compartment nodules but no pain.  Negative Finkelstein.  Continue home exercise program.  Follow-up once her symptoms recur.  She has had 1 injection which seems to be calming things down.  This was done October at [facility]      _____________________________________________________  CHIEF COMPLAINT:  Chief Complaint   Patient presents with    Left Wrist - Pain    Right Wrist - Pain         SUBJECTIVE:  Sam Peacock is a 69 y.o. female who presents to our office with a history of bilateral wrist pain.  The patient states that she has de Quervain's tenosynovitis and has been treated in the past with corticosteroid injections, most recently in October by [facility].  Today, she denies any real significant bilateral wrist pain.  She denies any numbness or tingling.  She denies any fever or chills.    The following portions of the patient's history were reviewed and updated as appropriate: allergies, current medications, past family history, past medical history, past social history, past surgical history and problem list.    PAST MEDICAL HISTORY:  Past Medical History:    Diagnosis Date    Arthritis     Cancer (HCC) 03/30/2016    Rt Kidney Removed    Chronic kidney disease     Diabetes mellitus (HCC)     Diabetes type 2, controlled (HCC)     Fallopian tube disorder     fills with fluid    Hypertension     Kidney cysts     Multiple thyroid nodules     Osteoarthritis     Stage 3 chronic kidney disease (HCC)        PAST SURGICAL HISTORY:  Past Surgical History:   Procedure Laterality Date    KIDNEY SURGERY Right     removal    TUBAL LIGATION Right     WRIST SURGERY Bilateral     basal joint soft tissue interpositional arthroplast L thumb 04/22 , 01/17/2024       FAMILY HISTORY:  Family History   Problem Relation Age of Onset    Heart disease Mother     Hypertension Mother     Hypertension Father     Diabetes Maternal Grandfather     Lung cancer Brother     Hypertension Brother     BRCA2 Positive Cousin     BRCA1 Positive Cousin     Breast cancer Cousin         maternal       SOCIAL HISTORY:  Social History     Tobacco Use    Smoking status: Never     Passive exposure: Never    Smokeless tobacco: Never   Vaping Use    Vaping status: Never Used   Substance Use Topics    Alcohol use: Never    Drug use: Yes     Types: Marijuana       MEDICATIONS:    Current Outpatient Medications:     allopurinol (ZYLOPRIM) 300 mg tablet, take 1 tablet by mouth once daily, Disp: 100 tablet, Rfl: 1    aspirin 81 mg chewable tablet, Chew 81 mg in the morning, Disp: , Rfl:     atenolol (TENORMIN) 50 mg tablet, take 1 tablet by mouth every morning, Disp: 90 tablet, Rfl: 1    atorvastatin (LIPITOR) 10 mg tablet, Take 1 tablet (10 mg total) by mouth every morning, Disp: 100 tablet, Rfl: 1    Cetirizine-Pseudoephedrine (ZYRTEC-D ALLERGY & CONGESTION PO), as needed, Disp: , Rfl:     cholecalciferol (VITAMIN D3) 25 mcg (1,000 units) tablet, 2,000 Units daily, Disp: , Rfl:     DULoxetine (CYMBALTA) 60 mg delayed release capsule, take 1 capsule by mouth once daily, Disp: 30 capsule, Rfl: 5    fluticasone (FLONASE)  "50 mcg/act nasal spray, 2 sprays into each nostril daily, Disp: 51 mL, Rfl: 3    Insulin Pen Needle (Droplet Pen Needles) 32G X 4 MM MISC, USE 3 TIMES A DAY AS DIRECTED, Disp: 100 each, Rfl: 2    Lancets (OneTouch Delica Plus [id number]) MISC, use 1 LANCET to TEST BLOOD SUGAR once daily, Disp: 100 each, Rfl: 1    liraglutide (VICTOZA) injection, inject 0.6 milligram subcutaneously daily, Disp: 6 mL, Rfl: 1    Omega-3 Fatty Acids (fish oil) 1,000 mg, , Disp: , Rfl:     OneTouch Ultra test strip, TEST 3 TIMES A DAY AS DIRECTED, Disp: 100 strip, Rfl: 1    Calcium Carbonate-Vit D-Min (CALCIUM 1200 PO), Take 1,200 mg by mouth daily (Patient not taking: Reported on 2/4/2025), Disp: , Rfl:     ALLERGIES:  Allergies   Allergen Reactions    Nsaids GI Intolerance     Chronic Kidney disease-right total nephrectomy    Pollen Extract Sneezing       ROS:  Review of Systems     Constitutional: Negative for fatigue, fever or loss of appetite.   HENT: Negative.    Respiratory: Negative for shortness of breath, dyspnea.    Cardiovascular: Negative for chest pain/tightness.   Gastrointestinal: Negative for abdominal pain, N/V.   Endocrine: Negative for cold/heat intolerance, unexplained weight loss/gain.   Genitourinary: Negative for flank pain, dysuria, hematuria.   Musculoskeletal: Positive for arthralgia   Skin: Negative for rash.    Neurological: Negative for numbness or tingling  Psychiatric/Behavioral: Negative for agitation.  _____________________________________________________  PHYSICAL EXAMINATION:    Height 5' 3\" (1.6 m), weight 64.9 kg (143 lb).    Constitutional: Oriented to person, place, and time. Appears well-developed and well-nourished. No distress.   HENT:   Head: Normocephalic.   Eyes: Conjunctivae are normal. Right eye exhibits no discharge. Left eye exhibits no discharge. No scleral icterus.   Cardiovascular: Normal rate.    Pulmonary/Chest: Effort normal.   Neurological: Alert and oriented to person, place, and " "time.   Skin: Skin is warm and dry. No rash noted. Not diaphoretic. No erythema. No pallor.   Psychiatric: Normal mood and affect. Behavior is normal. Judgment and thought content normal.      MUSCULOSKELETAL EXAMINATION:   Physical Exam  Ortho Exam    Bilateral upper extremities neurovascularly intact  Fingers are pink and mobile  Compartments are soft  Positive Finkelstein  Brisk up refill  Sensation intact  No warmth erythema      Objective:  BP Readings from Last 1 Encounters:   01/28/25 130/90      Wt Readings from Last 1 Encounters:   02/04/25 64.9 kg (143 lb)        BMI:   Estimated body mass index is 25.33 kg/m² as calculated from the following:    Height as of this encounter: 5' 3\" (1.6 m).    Weight as of this encounter: 64.9 kg (143 lb).        Scribe Attestation      I,:  Bari Rock PA-C am acting as a scribe while in the presence of the attending physician.:       I,:  Riccardo Fairbanks, DO personally performed the services described in this documentation    as scribed in my presence.:            "

## 2025-02-16 DIAGNOSIS — N18.31 TYPE 2 DIABETES MELLITUS WITH STAGE 3A CHRONIC KIDNEY DISEASE, WITHOUT LONG-TERM CURRENT USE OF INSULIN (HCC): ICD-10-CM

## 2025-02-16 DIAGNOSIS — E11.22 TYPE 2 DIABETES MELLITUS WITH STAGE 3A CHRONIC KIDNEY DISEASE, WITHOUT LONG-TERM CURRENT USE OF INSULIN (HCC): ICD-10-CM

## 2025-02-17 ENCOUNTER — TELEPHONE (OUTPATIENT)
Dept: PAIN MEDICINE | Facility: CLINIC | Age: 70
End: 2025-02-17

## 2025-02-17 RX ORDER — LIRAGLUTIDE 6 MG/ML
INJECTION SUBCUTANEOUS
Qty: 6 ML | Refills: 5 | Status: SHIPPED | OUTPATIENT
Start: 2025-02-17

## 2025-02-17 NOTE — PROGRESS NOTES
"Assessment:  1. Chronic bilateral low back pain, unspecified whether sciatica present    2. Primary osteoarthritis of one hip, left    3. Neurogenic claudication    4. Sacroiliitis (HCC)        Plan:  Orders Placed This Encounter   Procedures    MRI lumbar spine wo contrast     Standing Status:   Future     Expected Date:   2/19/2025     Expiration Date:   2/19/2029     Scheduling Instructions:      There is no preparation for this test. Please leave your jewelry and valuables at home, wedding rings are the exception. All patients will be required to change into a hospital gown and pants.  Street clothes are not permitted in the MRI.  Magnetic nail polish must be removed prior to arrival for your test. Please bring your insurance cards, a form of photo ID and a list of your medications with you. Arrive 15 minutes prior to your appointment time in order to register. Please bring any prior CT or MRI studies of this area that were not performed at a [facility].            To schedule this appointment, please contact Central Scheduling at [phone].            Prior to your appointment, please make sure you complete the MRI Screening Form when you e-Check in for your appointment. This will be available starting 7 days before your appointment in [employer]. You may receive an e-mail with an activation code if you do not have a [employer] account. If you do not have access to a device, we will complete your screening at your appointment.     Reason for Exam:   neurogenic claudication     For OP exams needed \"URGENT\", choose the appropriate timeframe below and call Central Scheduling at [phone]. No need to speak with a Radiologist.:   Not URGENT     What is the patient's sedation requirement?:   No Sedation     Does the patient need medication for Claustrophobia? If yes, order medication at this point.:   No     Does the patient wear a life vest, have an implanted cardiac device, a stimulation device, a sleep " apnea stimulator, or a breast tissue expansion device?:   Unknown     Release to patient through Mychart:   Immediate       New Medications Ordered This Visit   Medications    tiZANidine (ZANAFLEX) 4 mg tablet     Sig: Take 1 tablet (4 mg total) by mouth daily at bedtime as needed for muscle spasms     Dispense:  30 tablet     Refill:  0       My impressions and treatment recommendations were discussed in detail with the patient, who verbalized understanding and had no further questions.    This is a 69-year-old female who presents her office with chief complaint of bilateral lower back pain with radiation into the bilateral lower extremities.  She reports numbness and weakness in the legs as well as heaviness with about 1 block of ambulation.  Relieved with sitting.  Symptoms highly suggestive of neurogenic claudication secondary to spinal stenosis.  Will order updated MRI lumbar spine.  She reports she does not have red flag symptoms such as bowel bladder incontinence or saddle anesthesia.  She is also neurologically intact on exam today.    She does also have notable degenerative disease of the lumbar spine including advanced degenerative disc disease, mild sacroiliitis.  Some of her symptoms may be due to degenerative disease of the lumbar spine as well.    She does have some mild left groin pain today, however not quite to the degree it was prior to her hip injection.  We will hold off on scheduling repeat hip injection at this time.  First we will see her back following MRI in about 4 weeks.    In the interim, I did send tizanidine 4 mg nightly as needed.  Advise she can take half a tablet first to see how she responds before taking full tablet.    [place] Prescription Drug Monitoring Program report was reviewed and was appropriate     Complete risks and benefits including bleeding, infection, tissue reaction, nerve injury and allergic reaction were discussed. The approach was demonstrated using models  and literature was provided. Verbal and written consent was obtained.    Discharge instructions were provided. I personally saw and examined the patient and I agree with the above discussed plan of care.    History of Present Illness:    Sam Peacock is a 69 y.o. female who presents to [facility] for initial evaluation of the above stated pain complaints. The patient has a past medical and chronic pain history as outlined in the assessment section. She was referred by Referral Self  No address on file.    Status post left intra-articular hip injection on 11/27/2024 with notable relief.  She is here with slowly recurrent left hip pain as well as chief complaint of lower back pain for the last 4 years.  Pain is mild in intensity over the past month.  Currently 1 out of 10.  Occasional.    Pain is increased with standing, bending, walking.  No change with sitting, lying down.    She reports history of fibromyalgia.  Also has history of diabetes, high cholesterol, hypertension, CKD.  Next  She reports moderately with heat/ice therapy.    She reports using marijuana daily.  No alcohol or tobacco use.  Not allergic to latex.    Current medication includes acetaminophen and Voltaren gel with some relief.    Prolonged sitting is notably painful. She reports numbness down the back of the legs and into the feet that increases with walking along with heaviness. This occurs after ambulating about 1 block. Sitting does relief symptoms.  +shopping cart sign.     Review of Systems:    Review of Systems   Musculoskeletal:  Positive for arthralgias.   Neurological:  Positive for numbness.           Past Medical History:   Diagnosis Date    Arthritis     Cancer (HCC) 03/30/2016    Rt Kidney Removed    Chronic kidney disease     Diabetes mellitus (HCC)     Diabetes type 2, controlled (HCC)     Fallopian tube disorder     fills with fluid    Hypertension     Kidney cysts     Multiple thyroid nodules      Osteoarthritis     Stage 3 chronic kidney disease (HCC)        Past Surgical History:   Procedure Laterality Date    KIDNEY SURGERY Right     removal    TUBAL LIGATION Right     WRIST SURGERY Bilateral     basal joint soft tissue interpositional arthroplast L thumb 04/22 , 01/17/2024       Family History   Problem Relation Age of Onset    Heart disease Mother     Hypertension Mother     Hypertension Father     Diabetes Maternal Grandfather     Lung cancer Brother     Hypertension Brother     BRCA2 Positive Cousin     BRCA1 Positive Cousin     Breast cancer Cousin         maternal       Social History     Occupational History    Not on file   Tobacco Use    Smoking status: Never     Passive exposure: Never    Smokeless tobacco: Never   Vaping Use    Vaping status: Never Used   Substance and Sexual Activity    Alcohol use: Never    Drug use: Yes     Types: Marijuana    Sexual activity: Yes     Partners: Male     Birth control/protection: Post-menopausal         Current Outpatient Medications:     allopurinol (ZYLOPRIM) 300 mg tablet, take 1 tablet by mouth once daily, Disp: 100 tablet, Rfl: 1    aspirin 81 mg chewable tablet, Chew 81 mg in the morning, Disp: , Rfl:     atenolol (TENORMIN) 50 mg tablet, take 1 tablet by mouth every morning, Disp: 90 tablet, Rfl: 1    atorvastatin (LIPITOR) 10 mg tablet, Take 1 tablet (10 mg total) by mouth every morning, Disp: 100 tablet, Rfl: 1    Cetirizine-Pseudoephedrine (ZYRTEC-D ALLERGY & CONGESTION PO), as needed, Disp: , Rfl:     cholecalciferol (VITAMIN D3) 25 mcg (1,000 units) tablet, 2,000 Units daily, Disp: , Rfl:     DULoxetine (CYMBALTA) 60 mg delayed release capsule, take 1 capsule by mouth once daily, Disp: 30 capsule, Rfl: 5    fluticasone (FLONASE) 50 mcg/act nasal spray, 2 sprays into each nostril daily, Disp: 51 mL, Rfl: 3    Insulin Pen Needle (Droplet Pen Needles) 32G X 4 MM MISC, USE 3 TIMES A DAY AS DIRECTED, Disp: 100 each, Rfl: 5    Lancets (OneTouch Delica  "Plus [id number]) MISC, use 1 LANCET to TEST BLOOD SUGAR once daily, Disp: 100 each, Rfl: 1    liraglutide (VICTOZA) injection, inject 0.6 milligram subcutaneously daily, Disp: 6 mL, Rfl: 5    Omega-3 Fatty Acids (fish oil) 1,000 mg, , Disp: , Rfl:     OneTouch Ultra test strip, TEST 3 TIMES A DAY AS DIRECTED, Disp: 100 strip, Rfl: 1    tiZANidine (ZANAFLEX) 4 mg tablet, Take 1 tablet (4 mg total) by mouth daily at bedtime as needed for muscle spasms, Disp: 30 tablet, Rfl: 0    Calcium Carbonate-Vit D-Min (CALCIUM 1200 PO), Take 1,200 mg by mouth daily (Patient not taking: Reported on 2/4/2025), Disp: , Rfl:     Allergies   Allergen Reactions    Nsaids GI Intolerance     Chronic Kidney disease-right total nephrectomy    Pollen Extract Sneezing       Physical Exam:    Ht 5' 3\" (1.6 m)   Wt 64.9 kg (143 lb)   BMI 25.33 kg/m²     Constitutional: normal, well developed, well nourished, alert, in no distress and non-toxic and no overt pain behavior.  Eyes: anicteric  HEENT: grossly intact  Neck: supple, symmetric, trachea midline and no masses   Pulmonary:even and unlabored  Cardiovascular:No edema or pitting edema present  Skin:Normal without rashes or lesions and well hydrated  Psychiatric:Mood and affect appropriate  Neurologic:Cranial Nerves II-XII grossly intact  Musculoskeletal:normal    Lumbar Spine Exam    Appearance:  Normal lordosis  Palpation/Tenderness:  Ttp left SI joint, and bilateral lumbar paraspinal region  Sensory:  no sensory deficits noted  Range of Motion:  Flexion intact, notable pain with extension and bilateral rotation  Motor Strength:  Left hip flexion:  5/5  Left hip extension:  5/5  Right hip flexion:  5/5  Right hip extension:  5/5  Left knee flexion:  5/5  Left knee extension:  5/5  Right knee flexion:  5/5  Right knee extension:  5/5  Left foot dorsiflexion:  5/5  Left foot plantar flexion:  5/5  Right foot dorsiflexion:  5/5  Right foot plantar flexion:  5/5  Reflexes:  Left Patellar:  2+ "   Right Patellar:  2+   Left Achilles:  2+   Right Achilles:  2+   Special Tests:  Left Straight Leg Test:  negative  Right Straight Leg Test:  negative  VINCE test positive left, negative right  Positive gaenslen's, thigh thrust left      Imaging  MRI lumbar spine wo contrast    (Results Pending)       Orders Placed This Encounter   Procedures    MRI lumbar spine wo contrast

## 2025-02-18 DIAGNOSIS — E11.22 TYPE 2 DIABETES MELLITUS WITH STAGE 3A CHRONIC KIDNEY DISEASE, WITHOUT LONG-TERM CURRENT USE OF INSULIN (HCC): ICD-10-CM

## 2025-02-18 DIAGNOSIS — N18.31 TYPE 2 DIABETES MELLITUS WITH STAGE 3A CHRONIC KIDNEY DISEASE, WITHOUT LONG-TERM CURRENT USE OF INSULIN (HCC): ICD-10-CM

## 2025-02-19 ENCOUNTER — CONSULT (OUTPATIENT)
Dept: PAIN MEDICINE | Facility: CLINIC | Age: 70
End: 2025-02-19
Payer: MEDICARE

## 2025-02-19 VITALS — BODY MASS INDEX: 25.34 KG/M2 | WEIGHT: 143 LBS | HEIGHT: 63 IN

## 2025-02-19 DIAGNOSIS — M54.50 CHRONIC BILATERAL LOW BACK PAIN, UNSPECIFIED WHETHER SCIATICA PRESENT: Primary | ICD-10-CM

## 2025-02-19 DIAGNOSIS — R29.818 NEUROGENIC CLAUDICATION: ICD-10-CM

## 2025-02-19 DIAGNOSIS — M16.12 PRIMARY OSTEOARTHRITIS OF ONE HIP, LEFT: ICD-10-CM

## 2025-02-19 DIAGNOSIS — M46.1 SACROILIITIS (HCC): ICD-10-CM

## 2025-02-19 DIAGNOSIS — G89.29 CHRONIC BILATERAL LOW BACK PAIN, UNSPECIFIED WHETHER SCIATICA PRESENT: Primary | ICD-10-CM

## 2025-02-19 PROCEDURE — 99204 OFFICE O/P NEW MOD 45 MIN: CPT | Performed by: STUDENT IN AN ORGANIZED HEALTH CARE EDUCATION/TRAINING PROGRAM

## 2025-02-19 RX ORDER — BLOOD SUGAR DIAGNOSTIC
STRIP MISCELLANEOUS 3 TIMES DAILY
Qty: 100 STRIP | Refills: 1 | Status: SHIPPED | OUTPATIENT
Start: 2025-02-19

## 2025-02-19 RX ORDER — PEN NEEDLE, DIABETIC 32GX 5/32"
NEEDLE, DISPOSABLE MISCELLANEOUS 3 TIMES DAILY
Qty: 100 EACH | Refills: 5 | Status: SHIPPED | OUTPATIENT
Start: 2025-02-19

## 2025-02-19 RX ORDER — LANCETS 33 GAUGE
EACH MISCELLANEOUS
Qty: 100 EACH | Refills: 1 | Status: SHIPPED | OUTPATIENT
Start: 2025-02-19

## 2025-02-19 NOTE — PATIENT INSTRUCTIONS
"Patient Education     Low back pain - Discharge instructions   The Basics   Written by the doctors and editors at [facility]   What are discharge instructions? -- Discharge instructions are information about how to take care of yourself after getting medical care for a health problem.  What is low back pain? -- Low back pain is pain or discomfort in the lower part of your back. Many people have low back pain at some point, and it most often gets better on its own. Many different things can cause low back pain. Most of the time, doctors do not know the exact cause.  Back pain can happen if you strain a muscle. This is often what has happened when a person \"throws out\" their back. This refers to pain that starts suddenly after physical activity, like lifting something heavy or bending the back.  Back pain can also happen if you have (figure 1):   Damaged, bulging, or torn discs   Arthritis affecting the joints of the spine   Bony growths on the vertebrae that crowd nearby nerves   A \"compression fracture\" due to osteoporosis (a condition that makes your bones weak)   A vertebra out of place   Narrowing in the spinal canal   A tumor or infection (but this is very rare)  How do I care for myself at home? -- Ask the doctor or nurse what you should do when you go home. Make sure that you understand exactly what you need to do to care for yourself. Ask questions if there is anything you do not understand.  You should also:   Use heat on your back for short periods of time, if it helps with pain. Put a heating pad (on the low setting) on your back for 20 minutes at a time a few times each day. Never go to sleep with heat on your back.   Try to stay as active as you can without causing too much pain, if your doctor or nurse said that it is OK. If your pain is severe, you might need to rest for a day or 2. But it's important to get back to walking and moving as soon as possible. Try to keep doing your normal daily activities. " "Get up and move around gently during the day as you are able.   Slowly start to increase your activity level as you are able to. If something causes your pain to come back or get worse, stop and go back to doing easier activities that did not hurt.   Avoid sitting or standing in 1 position for a long time. You might want to sleep with a pillow under or between your knees if this eases your pain.   Take a medicine like ibuprofen (sample brand names: Advil, Motrin) or naproxen (brand name: Aleve) for pain, if needed. These are nonsteroidal antiinflammatory drugs (\"NSAIDs\"). They might work better than acetaminophen for low back pain.   Talk to your doctor or nurse before trying any of the following. These treatments might help you feel better for a little while:   Spinal manipulation - This is when a chiropractor, physical therapist, or other professional moves or \"adjusts\" the joints of your back.   Acupuncture - This is when someone who knows traditional Chinese medicine inserts tiny needles through your skin to block pain signals.   Massage therapy - The massage therapist manipulates your muscles and soft tissues to decrease muscle tension and increase relaxation.  What follow-up care do I need? -- Your doctor or nurse will tell you if you need to make a follow-up appointment. If so, make sure that you know when and where to go. The doctor might suggest that you see a physical therapist to learn exercises to help with your back pain.  When should I call the doctor? -- Call for emergency help right away (in the [place] and [place], call 9-1-1) if:   You are unable to walk, or cannot control your bowels or bladder.   You develop a fever of 100.4°F (38°C) or higher, chills, or night sweats.  Call your doctor for advice if:   Your legs are numb, weak, or tingly.   Your pain is getting worse, even with medicines and rest.   You develop a new rash.  All topics are updated as new evidence becomes available and our peer review " process is complete.  This topic retrieved from [employer] on: May 15, 2024.  Topic 909999 Version 1.0  Release: 32.4.3 - C32.134  © 2024 UpToDate, Inc. and/or its affiliates. All rights reserved.  figure 1: Anatomy of the back     This drawing shows the different parts of the back. Back pain can be caused by problems with the muscles, ligaments, discs, bones (vertebrae), or nerves.  Graphic 22121 Version 6.0  Consumer Information Use and Disclaimer   Disclaimer: This generalized information is a limited summary of diagnosis, treatment, and/or medication information. It is not meant to be comprehensive and should be used as a tool to help the user understand and/or assess potential diagnostic and treatment options. It does NOT include all information about conditions, treatments, medications, side effects, or risks that may apply to a specific patient. It is not intended to be medical advice or a substitute for the medical advice, diagnosis, or treatment of a health care provider based on the health care provider's examination and assessment of a patient's specific and unique circumstances. Patients must speak with a health care provider for complete information about their health, medical questions, and treatment options, including any risks or benefits regarding use of medications. This information does not endorse any treatments or medications as safe, effective, or approved for treating a specific patient. UpToDate, Inc. and its affiliates disclaim any warranty or liability relating to this information or the use thereof.The use of this information is governed by the Terms of Use, available at https://www.wolterskluwer.com/en/know/clinical-effectiveness-terms. 2024© UpToDate, Inc. and its affiliates and/or licensors. All rights reserved.  Copyright   © 2024 UpToDate, Inc. and/or its affiliates. All rights reserved.

## 2025-03-11 ENCOUNTER — HOSPITAL ENCOUNTER (OUTPATIENT)
Dept: MRI IMAGING | Facility: CLINIC | Age: 70
Discharge: HOME | End: 2025-03-11
Payer: MEDICARE

## 2025-03-11 DIAGNOSIS — R29.818 NEUROGENIC CLAUDICATION: ICD-10-CM

## 2025-03-11 PROCEDURE — 72148 MRI LUMBAR SPINE W/O DYE: CPT

## 2025-03-21 ENCOUNTER — OFFICE VISIT (OUTPATIENT)
Dept: PAIN MEDICINE | Facility: CLINIC | Age: 70
End: 2025-03-21
Payer: MEDICARE

## 2025-03-21 ENCOUNTER — PATIENT MESSAGE (OUTPATIENT)
Dept: PAIN MEDICINE | Facility: CLINIC | Age: 70
End: 2025-03-21

## 2025-03-21 VITALS — WEIGHT: 143 LBS | BODY MASS INDEX: 25.34 KG/M2 | HEIGHT: 63 IN

## 2025-03-21 DIAGNOSIS — M54.50 CHRONIC BILATERAL LOW BACK PAIN, UNSPECIFIED WHETHER SCIATICA PRESENT: ICD-10-CM

## 2025-03-21 DIAGNOSIS — M46.1 SACROILIITIS (HCC): ICD-10-CM

## 2025-03-21 DIAGNOSIS — M47.816 LUMBAR SPONDYLOSIS: ICD-10-CM

## 2025-03-21 DIAGNOSIS — M48.062 SPINAL STENOSIS OF LUMBAR REGION WITH NEUROGENIC CLAUDICATION: ICD-10-CM

## 2025-03-21 DIAGNOSIS — G89.29 CHRONIC BILATERAL LOW BACK PAIN, UNSPECIFIED WHETHER SCIATICA PRESENT: ICD-10-CM

## 2025-03-21 DIAGNOSIS — M25.552 LEFT HIP PAIN: Primary | ICD-10-CM

## 2025-03-21 PROCEDURE — 99214 OFFICE O/P EST MOD 30 MIN: CPT

## 2025-03-21 NOTE — PATIENT INSTRUCTIONS
Sacroiliac Joint Injection   WHAT YOU NEED TO KNOW:   What do I need to know about a sacroiliac joint injection?  A sacroiliac (SI) joint injection is done to diagnose or treat pain from sacroiliac joint syndrome. The pain caused by this syndrome may be felt in your lower back, buttocks, groin, and your thigh.   How do I prepare for an SI injection?  Your healthcare provider will ask you to not take any pain medicine the day of the injection. Ask him if there are any other medicines you should not take. You will need to find someone to drive you home after your procedure.  What will happen during the SI injection?  You will be awake for your injection. You may be given calming medicine if you are anxious.  You will lie on your stomach with a pillow under your abdomen. Your healthcare provider will give you an injection of medicine to numb the area. He may use an x-ray, ultrasound, or CT scan to find the area to inject. You may also be given an injection of contrast material to help your SI joint show up better. Then, your healthcare provider will inject local anesthesia, antiinflammatory medicine, or both into your SI joint.    Healthcare providers will watch you closely for any problems for up to 30 minutes after your injection. Your healthcare provider will check to see if your pain decreases after the injection.    What are the risks of an SI injection?  You may have some weakness for a short time after your injection. The SI injection can cause bleeding, infection, and pain. It can also cause temporary weakness in your leg and problems urinating. You may have an allergic reaction to the medicine that is injected into your SI joint. Your pain may return and you may need more treatment.  CARE AGREEMENT:   You have the right to help plan your care. Learn about your health condition and how it may be treated. Discuss treatment options with your healthcare providers to decide what care you want to receive. You always  have the right to refuse treatment. The above information is an  only. It is not intended as medical advice for individual conditions or treatments. Talk to your doctor, nurse or pharmacist before following any medical regimen to see if it is safe and effective for you.  © Copyright [employer] 2022 Information is for End User's use only and may not be sold, redistributed or otherwise used for commercial purposes. All illustrations and images included in CareNotes® are the copyrighted property of [employer] or [employer]     Tizanidine (By mouth)   Tizanidine (oal-XJC-s-liam)  Treats muscle spasticity.   Brand Name(s): Zanaflex, Zanaflex Capsule   There may be other brand names for this medicine.  When This Medicine Should Not Be Used:   This medicine is not right for everyone. Do not use if you had an allergic reaction to tizanidine.  How to Use This Medicine:   Capsule, Tablet  Take your medicine as directed. Your dose may need to be changed several times to find what works best for you.  You may take this medicine with or without food, but always take it the same way every time. Tizanidine works differently depending on whether you take it on an empty stomach or a full stomach. Talk to your doctor if you have any questions about this.  Missed dose: Take a dose as soon as you remember. If it is almost time for your next dose, wait until then and take a regular dose. Do not take extra medicine to make up for a missed dose.  Store the medicine in a closed container at room temperature, away from heat, moisture, and direct light.  Drugs and Foods to Avoid:   Ask your doctor or pharmacist before using any other medicine, including over-the-counter medicines, vitamins, and herbal products.  Do not use this medicine together with ciprofloxacin or fluvoxamine.  Some foods and medicines can affect how tizanidine works. Tell your doctor if you are using any of the following:  Acyclovir,  baclofen, cimetidine, famotidine, ticlopidine, verapamil, zileuton  Birth control pills, blood pressure medicine, medicine for heart rhythm problems (such as amiodarone, mexiletine, propafenone), or medicine to treat an infection (such as levofloxacin, moxifloxacin)  Do not drink alcohol while you are using this medicine.  Tell your doctor if you use anything else that makes you sleepy. Some examples are allergy medicine, narcotic pain medicine, and alcohol.  Warnings While Using This Medicine:   Tell your doctor if you are pregnant or breastfeeding, or if you have kidney disease or liver disease.  This medicine may cause the following problems:  Low blood pressure  Liver damage  This medicine may make you dizzy or drowsy. Do not drive or do anything else that could be dangerous until you know how this medicine affects you. Stand or sit up slowly if you are dizzy.  Do not stop using this medicine suddenly. Your doctor will need to slowly decrease your dose before you stop it completely.  Your doctor will do lab tests at regular visits to check on the effects of this medicine. Keep all appointments.  Keep all medicine out of the reach of children. Never share your medicine with anyone.  Possible Side Effects While Using This Medicine:   Call your doctor right away if you notice any of these side effects:  Allergic reaction: Itching or hives, swelling in your face or hands, swelling or tingling in your mouth or throat, chest tightness, trouble breathing  Dark urine or pale stools, nausea, vomiting, loss of appetite, stomach pain, yellow skin or eyes  Lightheadedness, dizziness, or fainting  Seeing or hearing things that are not really there  Slow heartbeat  If you notice these less serious side effects, talk with your doctor:   Dry mouth  Drowsiness or sleepiness  Weakness  If you notice other side effects that you think are caused by this medicine, tell your doctor.   Call your doctor for medical advice about side  effects. You may report side effects to FDA at 3-974-FDA-2070    © Copyright [employer] 2022 Information is for End User's use only and may not be sold, redistributed or otherwise used for commercial purposes.  The above information is an  only. It is not intended as medical advice for individual conditions or treatments. Talk to your doctor, nurse or pharmacist before following any medical regimen to see if it is safe and effective for you.

## 2025-03-21 NOTE — PATIENT COMMUNICATION
Pt is scheduled for hip and sij injection with Dr Stanford on 4/8/25 and 4/22/25     Pt is diabetic, but per chart, does not wear a glucose monitor     Pt given instructions in office and via myc message     Have you completed PT/HEP/Chiro in the past 6 months for dedicated area? Pt has tried meds and previous injections for pain relief  If yes, how long did you complete?  What was the frequency?  Did it provide relief?  If no, reason therapy was not completed?

## 2025-03-21 NOTE — PROGRESS NOTES
Pain Medicine Follow-Up Note    Assessment:  1. Left hip pain    2. Sacroiliitis (HCC)    3. Lumbar spondylosis    4. Spinal stenosis of lumbar region with neurogenic claudication    5. Chronic bilateral low back pain, unspecified whether sciatica present        Plan:  Orders Placed This Encounter   Procedures   • FL spine and pain procedure     Standing Status:   Future     Expected Date:   3/21/2025     Expiration Date:   3/21/2029     Reason for Exam::   left hip injection   1st     Anticoagulant hold needed?:   no   • FL spine and pain procedure     Standing Status:   Future     Expected Date:   3/21/2025     Expiration Date:   3/21/2029     Reason for Exam::   left SI joint injection     Anticoagulant hold needed?:   no   • Ambulatory referral to Physical Therapy     Standing Status:   Future     Expiration Date:   3/21/2026     Referral Priority:   Routine     Referral Type:   Physical Therapy     Referral Reason:   Specialty Services Required     Requested Specialty:   Physical Therapy     Number of Visits Requested:   1     Expiration Date:   3/21/2026       New Medications Ordered This Visit   Medications   • tiZANidine (ZANAFLEX) 4 mg tablet     Sig: Take 1 tablet (4 mg total) by mouth daily at bedtime as needed for muscle spasms     Dispense:  90 tablet     Refill:  0       My impressions and treatment recommendations were discussed in detail with the patient who verbalized understanding and had no further questions.      Patient returns to the office to review patient's MRI.  MRI reviewed in detail patient does have spinal stenosis, patient does describe neurogenic claudication symptoms.  Patient also has significant facet arthrosis throughout the lumbar spine.  On exam the patient does have left-sided sacroiliitis which is reproducible with multiple provocative maneuvers including Constantino's maneuver, Gaenslen's tests, and pelvic distraction test.  I recommend that the patient have a left SI joint  injection.  Patient is also having significant pain being generated from her left hip, in November 2024 patient had a left intra-articular hip injection which provided her 3 months of excellent pain relief therefore I recommend the patient also have this injection. Complete risks and benefits including bleeding, infection, tissue reaction, nerve injury and allergic reaction were discussed. The approach was demonstrated using models and literature was provided. Verbal and written consent was obtained.    The patient continues to report an overall reduction of her pain level and improvement with her functioning without significant side effects using tizanidine 4 mg daily at bedtime as needed for pain/muscle spasms, therefore I will continue the patient on this medication.  Patient also motivated to initiate physical therapy, I recommend the patient trial aqua therapy.  Referral provided.    Follow-up is planned in 4 weeks after injections time or sooner as warranted.  Discharge instructions were provided. I personally saw and examined the patient and I agree with the above discussed plan of care.    History of Present Illness:    Sam Peacock is a 69 y.o. female who presents to [facility] for interval re-evaluation of the above stated pain complaints. The patient has a past medical and chronic pain history as outlined in the assessment section. She was last seen on 2/19/2025.    At today's visit patient states that their pain symptoms are the same with a pain score of 3/10 on the verbal numeric pain scale.  The patient's pain is worse in the evening.  The patient's pain is constant in nature when walking intermittently when inactive.  And the quality of the patient's pain is described as burning, sharp, throbbing, shooting, numbness, and pins-and-needles.  The patient's pain is located in the left/mid low back, left anterior thigh.  Patient states on rare occasion she has pain that shoots  down her posterior legs to her feet.  Patient reports pain relief with using tizanidine 4 mg tablet daily at bedtime as needed for pain/muscle spasms.  Patient denies any significant side effects using this medication.    Other than as stated above, the patient denies any interval changes in medications, medical condition, mental condition, symptoms, or allergies since the last office visit.         Review of Systems:    Review of Systems   Respiratory:  Negative for shortness of breath.    Cardiovascular:  Negative for chest pain.   Gastrointestinal:  Negative for constipation, diarrhea, nausea and vomiting.   Musculoskeletal:  Positive for arthralgias, back pain and gait problem. Negative for joint swelling and myalgias.   Skin:  Negative for rash.   Neurological:  Negative for dizziness, seizures and weakness.   All other systems reviewed and are negative.        Past Medical History:   Diagnosis Date   • Arthritis    • Cancer (HCC) 03/30/2016    Rt Kidney Removed   • Chronic kidney disease    • Diabetes mellitus (HCC)    • Diabetes type 2, controlled (HCC)    • Fallopian tube disorder     fills with fluid   • Hypertension    • Kidney cysts    • Multiple thyroid nodules    • Osteoarthritis    • Stage 3 chronic kidney disease (HCC)        Past Surgical History:   Procedure Laterality Date   • KIDNEY SURGERY Right     removal   • TUBAL LIGATION Right    • WRIST SURGERY Bilateral     basal joint soft tissue interpositional arthroplast L thumb 04/22 , 01/17/2024       Family History   Problem Relation Age of Onset   • Heart disease Mother    • Hypertension Mother    • Hypertension Father    • Diabetes Maternal Grandfather    • Lung cancer Brother    • Hypertension Brother    • BRCA2 Positive Cousin    • BRCA1 Positive Cousin    • Breast cancer Cousin         maternal       Social History     Occupational History   • Not on file   Tobacco Use   • Smoking status: Never     Passive exposure: Never   • Smokeless tobacco:  Never   Vaping Use   • Vaping status: Never Used   Substance and Sexual Activity   • Alcohol use: Never   • Drug use: Yes     Types: Marijuana   • Sexual activity: Yes     Partners: Male     Birth control/protection: Post-menopausal         Current Outpatient Medications:   •  allopurinol (ZYLOPRIM) 300 mg tablet, take 1 tablet by mouth once daily, Disp: 100 tablet, Rfl: 1  •  aspirin 81 mg chewable tablet, Chew 81 mg in the morning, Disp: , Rfl:   •  atenolol (TENORMIN) 50 mg tablet, take 1 tablet by mouth every morning, Disp: 90 tablet, Rfl: 1  •  atorvastatin (LIPITOR) 10 mg tablet, Take 1 tablet (10 mg total) by mouth every morning, Disp: 100 tablet, Rfl: 1  •  Cetirizine-Pseudoephedrine (ZYRTEC-D ALLERGY & CONGESTION PO), as needed, Disp: , Rfl:   •  cholecalciferol (VITAMIN D3) 25 mcg (1,000 units) tablet, 2,000 Units daily, Disp: , Rfl:   •  DULoxetine (CYMBALTA) 60 mg delayed release capsule, take 1 capsule by mouth once daily, Disp: 30 capsule, Rfl: 5  •  fluticasone (FLONASE) 50 mcg/act nasal spray, 2 sprays into each nostril daily, Disp: 51 mL, Rfl: 3  •  Insulin Pen Needle (Droplet Pen Needles) 32G X 4 MM MISC, USE 3 TIMES A DAY AS DIRECTED, Disp: 100 each, Rfl: 5  •  Lancets (OneTouch Delica Plus [id number]) MISC, use 1 LANCET to TEST BLOOD SUGAR once daily, Disp: 100 each, Rfl: 1  •  liraglutide (VICTOZA) injection, inject 0.6 milligram subcutaneously daily, Disp: 6 mL, Rfl: 5  •  Omega-3 Fatty Acids (fish oil) 1,000 mg, , Disp: , Rfl:   •  OneTouch Ultra test strip, TEST 3 TIMES A DAY AS DIRECTED, Disp: 100 strip, Rfl: 1  •  tiZANidine (ZANAFLEX) 4 mg tablet, Take 1 tablet (4 mg total) by mouth daily at bedtime as needed for muscle spasms, Disp: 90 tablet, Rfl: 0  •  Calcium Carbonate-Vit D-Min (CALCIUM 1200 PO), Take 1,200 mg by mouth daily (Patient not taking: Reported on 2/4/2025), Disp: , Rfl:     Allergies   Allergen Reactions   • Nsaids GI Intolerance     Chronic Kidney disease-right total  "nephrectomy   • Pollen Extract Sneezing       Physical Exam:    Ht 5' 3\" (1.6 m)   Wt 64.9 kg (143 lb)   BMI 25.33 kg/m²     Constitutional:normal, well developed, well nourished, alert, in no distress and non-toxic and no overt pain behavior.  Eyes:anicteric  HEENT:grossly intact  Neck:supple, symmetric, trachea midline and no masses   Pulmonary:even and unlabored  Cardiovascular:No edema or pitting edema present  Skin:Normal without rashes or lesions and well hydrated  Psychiatric:Mood and affect appropriate  Neurologic:Cranial Nerves II-XII grossly intact  Musculoskeletal:antalgic gait    Lumbar Spine Exam    Appearance:  Normal lordosis  Palpation/Tenderness:  left lumbar paraspinal tenderness  left sacroiliac joint tenderness  left piriformis tenderness  Range of Motion:  Flexion:  No limitation  without pain  Extension:  Moderately limited  with pain  Lateral Flexion - Left:  Minimally limited  with pain  Lateral Flexion - Right:  Minimally limited  with pain  Rotation - Left:  Minimally limited  with pain  Rotation - Right:  Minimally limited  with pain  Special Tests:  Left Straight Leg Test:  negative  Right Straight Leg Test:  negative  Left Constantino's Maneuver:  positive  Right Cnostantino's Maneuver:  negative  Left Gaenslen's Test:  positive  Right Gaenslen's Test:  positive  Left Pelvic Distraction Test:  positive  Right Pelvic Distraction Test:  negative  Left Piriformis Stretch Test:  negative  Right Piriformis Stretch Test:  negative  Left Log roll: positive  Right Log roll: negative      Imaging    MRI LUMBAR SPINE WITHOUT CONTRAST           3/11/25     INDICATION: R29.818: Other symptoms and signs involving the nervous system.   Neurogenic claudication     COMPARISON: Outside exam dated 6/21/2021     TECHNIQUE:  Multiplanar, multisequence imaging of the lumbar spine was performed. .        IMAGE QUALITY:  Diagnostic     FINDINGS:     VERTEBRAL BODIES:  There are 5 lumbar type vertebral bodies. Mild " biphasic scoliosis. Grade 1 anterolisthesis at L3-4. No destructive osseous lesions. Type I Modic endplate changes at L4-5.     SACRUM:  Normal signal within the sacrum. No evidence of insufficiency or stress fracture.     DISTAL CORD AND CONUS:  Normal size and signal within the distal cord and conus.     PARASPINAL SOFT TISSUES: Moderate paraspinous muscular fatty atrophy.     LOWER THORACIC DISC SPACES:  Normal disc height and signal.  No disc herniation, canal stenosis or foraminal narrowing.     LUMBAR DISC SPACES:     L1-L2: Mild bilateral facet arthrosis and ligamentum flavum thickening. Broad disc bulge. Mild canal narrowing and bilateral subarticular crowding. No foraminal stenosis.     L2-L3: Ligamentum flavum thickening and dorsal epidural fat. Bilateral facet arthrosis and ligamentum flavum thickening. Broad disc bulge with marginal osteophytes. Severe canal stenosis and bilateral subarticular narrowing. Left foraminal extrusion   produces moderate left foraminal narrowing. No right foraminal stenosis.     L3-L4: Severe bilateral facet arthrosis and ligamentum flavum thickening. Anterolisthesis with unroofing of the disc and broad disc bulge. Severe canal stenosis and bilateral subarticular stenosis. Mild bilateral foraminal stenosis.     L4-L5: Disc height loss. Disc bulge with central extrusion with caudal migration. Moderate bilateral facet arthrosis and ligamentum flavum thickening. Mild to moderate canal narrowing and bilateral subarticular stenosis. Marginal osteophytes contribute   to mild left and moderate right foraminal stenosis.     L5-S1: Moderate bilateral facet arthrosis. Disc height loss with broad disc osteophyte. No canal narrowing. Bilateral subarticular narrowing. Mild bilateral foraminal narrowing.     OTHER FINDINGS:  None.     IMPRESSION:     Multilevel degenerative disc disease with subluxations as described. Severe canal stenosis at L2-3 and L3-4. Multilevel foraminal and  subarticular stenosis as outlined above. Disease has progressed slightly at multiple levels compared to prior from   6/21/2021.  FL spine and pain procedure    (Results Pending)   FL spine and pain procedure    (Results Pending)         Orders Placed This Encounter   Procedures   • FL spine and pain procedure   • FL spine and pain procedure   • Ambulatory referral to Physical Therapy     Time spent with patient: 30 min    This document was created using speech voice recognition software.   Grammatical errors, random word insertions, pronoun errors, and incomplete sentences are an occasional consequence of this system due to software limitations, ambient noise, and hardware issues.   Any formal questions or concerns about content, text, or information contained within the body of this dictation should be directly addressed to the provider for clarification.

## 2025-03-21 NOTE — H&P (VIEW-ONLY)
Pain Medicine Follow-Up Note    Assessment:  1. Left hip pain    2. Sacroiliitis (HCC)    3. Lumbar spondylosis    4. Spinal stenosis of lumbar region with neurogenic claudication    5. Chronic bilateral low back pain, unspecified whether sciatica present        Plan:  Orders Placed This Encounter   Procedures   • FL spine and pain procedure     Standing Status:   Future     Expected Date:   3/21/2025     Expiration Date:   3/21/2029     Reason for Exam::   left hip injection   1st     Anticoagulant hold needed?:   no   • FL spine and pain procedure     Standing Status:   Future     Expected Date:   3/21/2025     Expiration Date:   3/21/2029     Reason for Exam::   left SI joint injection     Anticoagulant hold needed?:   no   • Ambulatory referral to Physical Therapy     Standing Status:   Future     Expiration Date:   3/21/2026     Referral Priority:   Routine     Referral Type:   Physical Therapy     Referral Reason:   Specialty Services Required     Requested Specialty:   Physical Therapy     Number of Visits Requested:   1     Expiration Date:   3/21/2026       New Medications Ordered This Visit   Medications   • tiZANidine (ZANAFLEX) 4 mg tablet     Sig: Take 1 tablet (4 mg total) by mouth daily at bedtime as needed for muscle spasms     Dispense:  90 tablet     Refill:  0       My impressions and treatment recommendations were discussed in detail with the patient who verbalized understanding and had no further questions.      Patient returns to the office to review patient's MRI.  MRI reviewed in detail patient does have spinal stenosis, patient does describe neurogenic claudication symptoms.  Patient also has significant facet arthrosis throughout the lumbar spine.  On exam the patient does have left-sided sacroiliitis which is reproducible with multiple provocative maneuvers including Constantino's maneuver, Gaenslen's tests, and pelvic distraction test.  I recommend that the patient have a left SI joint  injection.  Patient is also having significant pain being generated from her left hip, in November 2024 patient had a left intra-articular hip injection which provided her 3 months of excellent pain relief therefore I recommend the patient also have this injection. Complete risks and benefits including bleeding, infection, tissue reaction, nerve injury and allergic reaction were discussed. The approach was demonstrated using models and literature was provided. Verbal and written consent was obtained.    The patient continues to report an overall reduction of her pain level and improvement with her functioning without significant side effects using tizanidine 4 mg daily at bedtime as needed for pain/muscle spasms, therefore I will continue the patient on this medication.  Patient also motivated to initiate physical therapy, I recommend the patient trial aqua therapy.  Referral provided.    Follow-up is planned in 4 weeks after injections time or sooner as warranted.  Discharge instructions were provided. I personally saw and examined the patient and I agree with the above discussed plan of care.    History of Present Illness:    Sam Peacock is a 69 y.o. female who presents to [facility] for interval re-evaluation of the above stated pain complaints. The patient has a past medical and chronic pain history as outlined in the assessment section. She was last seen on 2/19/2025.    At today's visit patient states that their pain symptoms are the same with a pain score of 3/10 on the verbal numeric pain scale.  The patient's pain is worse in the evening.  The patient's pain is constant in nature when walking intermittently when inactive.  And the quality of the patient's pain is described as burning, sharp, throbbing, shooting, numbness, and pins-and-needles.  The patient's pain is located in the left/mid low back, left anterior thigh.  Patient states on rare occasion she has pain that shoots  down her posterior legs to her feet.  Patient reports pain relief with using tizanidine 4 mg tablet daily at bedtime as needed for pain/muscle spasms.  Patient denies any significant side effects using this medication.    Other than as stated above, the patient denies any interval changes in medications, medical condition, mental condition, symptoms, or allergies since the last office visit.         Review of Systems:    Review of Systems   Respiratory:  Negative for shortness of breath.    Cardiovascular:  Negative for chest pain.   Gastrointestinal:  Negative for constipation, diarrhea, nausea and vomiting.   Musculoskeletal:  Positive for arthralgias, back pain and gait problem. Negative for joint swelling and myalgias.   Skin:  Negative for rash.   Neurological:  Negative for dizziness, seizures and weakness.   All other systems reviewed and are negative.        Past Medical History:   Diagnosis Date   • Arthritis    • Cancer (HCC) 03/30/2016    Rt Kidney Removed   • Chronic kidney disease    • Diabetes mellitus (HCC)    • Diabetes type 2, controlled (HCC)    • Fallopian tube disorder     fills with fluid   • Hypertension    • Kidney cysts    • Multiple thyroid nodules    • Osteoarthritis    • Stage 3 chronic kidney disease (HCC)        Past Surgical History:   Procedure Laterality Date   • KIDNEY SURGERY Right     removal   • TUBAL LIGATION Right    • WRIST SURGERY Bilateral     basal joint soft tissue interpositional arthroplast L thumb 04/22 , 01/17/2024       Family History   Problem Relation Age of Onset   • Heart disease Mother    • Hypertension Mother    • Hypertension Father    • Diabetes Maternal Grandfather    • Lung cancer Brother    • Hypertension Brother    • BRCA2 Positive Cousin    • BRCA1 Positive Cousin    • Breast cancer Cousin         maternal       Social History     Occupational History   • Not on file   Tobacco Use   • Smoking status: Never     Passive exposure: Never   • Smokeless tobacco:  Never   Vaping Use   • Vaping status: Never Used   Substance and Sexual Activity   • Alcohol use: Never   • Drug use: Yes     Types: Marijuana   • Sexual activity: Yes     Partners: Male     Birth control/protection: Post-menopausal         Current Outpatient Medications:   •  allopurinol (ZYLOPRIM) 300 mg tablet, take 1 tablet by mouth once daily, Disp: 100 tablet, Rfl: 1  •  aspirin 81 mg chewable tablet, Chew 81 mg in the morning, Disp: , Rfl:   •  atenolol (TENORMIN) 50 mg tablet, take 1 tablet by mouth every morning, Disp: 90 tablet, Rfl: 1  •  atorvastatin (LIPITOR) 10 mg tablet, Take 1 tablet (10 mg total) by mouth every morning, Disp: 100 tablet, Rfl: 1  •  Cetirizine-Pseudoephedrine (ZYRTEC-D ALLERGY & CONGESTION PO), as needed, Disp: , Rfl:   •  cholecalciferol (VITAMIN D3) 25 mcg (1,000 units) tablet, 2,000 Units daily, Disp: , Rfl:   •  DULoxetine (CYMBALTA) 60 mg delayed release capsule, take 1 capsule by mouth once daily, Disp: 30 capsule, Rfl: 5  •  fluticasone (FLONASE) 50 mcg/act nasal spray, 2 sprays into each nostril daily, Disp: 51 mL, Rfl: 3  •  Insulin Pen Needle (Droplet Pen Needles) 32G X 4 MM MISC, USE 3 TIMES A DAY AS DIRECTED, Disp: 100 each, Rfl: 5  •  Lancets (OneTouch Delica Plus [id number]) MISC, use 1 LANCET to TEST BLOOD SUGAR once daily, Disp: 100 each, Rfl: 1  •  liraglutide (VICTOZA) injection, inject 0.6 milligram subcutaneously daily, Disp: 6 mL, Rfl: 5  •  Omega-3 Fatty Acids (fish oil) 1,000 mg, , Disp: , Rfl:   •  OneTouch Ultra test strip, TEST 3 TIMES A DAY AS DIRECTED, Disp: 100 strip, Rfl: 1  •  tiZANidine (ZANAFLEX) 4 mg tablet, Take 1 tablet (4 mg total) by mouth daily at bedtime as needed for muscle spasms, Disp: 90 tablet, Rfl: 0  •  Calcium Carbonate-Vit D-Min (CALCIUM 1200 PO), Take 1,200 mg by mouth daily (Patient not taking: Reported on 2/4/2025), Disp: , Rfl:     Allergies   Allergen Reactions   • Nsaids GI Intolerance     Chronic Kidney disease-right total  "nephrectomy   • Pollen Extract Sneezing       Physical Exam:    Ht 5' 3\" (1.6 m)   Wt 64.9 kg (143 lb)   BMI 25.33 kg/m²     Constitutional:normal, well developed, well nourished, alert, in no distress and non-toxic and no overt pain behavior.  Eyes:anicteric  HEENT:grossly intact  Neck:supple, symmetric, trachea midline and no masses   Pulmonary:even and unlabored  Cardiovascular:No edema or pitting edema present  Skin:Normal without rashes or lesions and well hydrated  Psychiatric:Mood and affect appropriate  Neurologic:Cranial Nerves II-XII grossly intact  Musculoskeletal:antalgic gait    Lumbar Spine Exam    Appearance:  Normal lordosis  Palpation/Tenderness:  left lumbar paraspinal tenderness  left sacroiliac joint tenderness  left piriformis tenderness  Range of Motion:  Flexion:  No limitation  without pain  Extension:  Moderately limited  with pain  Lateral Flexion - Left:  Minimally limited  with pain  Lateral Flexion - Right:  Minimally limited  with pain  Rotation - Left:  Minimally limited  with pain  Rotation - Right:  Minimally limited  with pain  Special Tests:  Left Straight Leg Test:  negative  Right Straight Leg Test:  negative  Left Constantino's Maneuver:  positive  Right Constantino's Maneuver:  negative  Left Gaenslen's Test:  positive  Right Gaenslen's Test:  positive  Left Pelvic Distraction Test:  positive  Right Pelvic Distraction Test:  negative  Left Piriformis Stretch Test:  negative  Right Piriformis Stretch Test:  negative  Left Log roll: positive  Right Log roll: negative      Imaging    MRI LUMBAR SPINE WITHOUT CONTRAST           3/11/25     INDICATION: R29.818: Other symptoms and signs involving the nervous system.   Neurogenic claudication     COMPARISON: Outside exam dated 6/21/2021     TECHNIQUE:  Multiplanar, multisequence imaging of the lumbar spine was performed. .        IMAGE QUALITY:  Diagnostic     FINDINGS:     VERTEBRAL BODIES:  There are 5 lumbar type vertebral bodies. Mild " biphasic scoliosis. Grade 1 anterolisthesis at L3-4. No destructive osseous lesions. Type I Modic endplate changes at L4-5.     SACRUM:  Normal signal within the sacrum. No evidence of insufficiency or stress fracture.     DISTAL CORD AND CONUS:  Normal size and signal within the distal cord and conus.     PARASPINAL SOFT TISSUES: Moderate paraspinous muscular fatty atrophy.     LOWER THORACIC DISC SPACES:  Normal disc height and signal.  No disc herniation, canal stenosis or foraminal narrowing.     LUMBAR DISC SPACES:     L1-L2: Mild bilateral facet arthrosis and ligamentum flavum thickening. Broad disc bulge. Mild canal narrowing and bilateral subarticular crowding. No foraminal stenosis.     L2-L3: Ligamentum flavum thickening and dorsal epidural fat. Bilateral facet arthrosis and ligamentum flavum thickening. Broad disc bulge with marginal osteophytes. Severe canal stenosis and bilateral subarticular narrowing. Left foraminal extrusion   produces moderate left foraminal narrowing. No right foraminal stenosis.     L3-L4: Severe bilateral facet arthrosis and ligamentum flavum thickening. Anterolisthesis with unroofing of the disc and broad disc bulge. Severe canal stenosis and bilateral subarticular stenosis. Mild bilateral foraminal stenosis.     L4-L5: Disc height loss. Disc bulge with central extrusion with caudal migration. Moderate bilateral facet arthrosis and ligamentum flavum thickening. Mild to moderate canal narrowing and bilateral subarticular stenosis. Marginal osteophytes contribute   to mild left and moderate right foraminal stenosis.     L5-S1: Moderate bilateral facet arthrosis. Disc height loss with broad disc osteophyte. No canal narrowing. Bilateral subarticular narrowing. Mild bilateral foraminal narrowing.     OTHER FINDINGS:  None.     IMPRESSION:     Multilevel degenerative disc disease with subluxations as described. Severe canal stenosis at L2-3 and L3-4. Multilevel foraminal and  subarticular stenosis as outlined above. Disease has progressed slightly at multiple levels compared to prior from   6/21/2021.  FL spine and pain procedure    (Results Pending)   FL spine and pain procedure    (Results Pending)         Orders Placed This Encounter   Procedures   • FL spine and pain procedure   • FL spine and pain procedure   • Ambulatory referral to Physical Therapy     Time spent with patient: 30 min    This document was created using speech voice recognition software.   Grammatical errors, random word insertions, pronoun errors, and incomplete sentences are an occasional consequence of this system due to software limitations, ambient noise, and hardware issues.   Any formal questions or concerns about content, text, or information contained within the body of this dictation should be directly addressed to the provider for clarification.

## 2025-03-26 ENCOUNTER — EVALUATION (OUTPATIENT)
Dept: PHYSICAL THERAPY | Facility: CLINIC | Age: 70
End: 2025-03-26
Payer: MEDICARE

## 2025-03-26 ENCOUNTER — TELEPHONE (OUTPATIENT)
Age: 70
End: 2025-03-26

## 2025-03-26 DIAGNOSIS — M48.062 SPINAL STENOSIS OF LUMBAR REGION WITH NEUROGENIC CLAUDICATION: ICD-10-CM

## 2025-03-26 DIAGNOSIS — M47.816 LUMBAR SPONDYLOSIS: Primary | ICD-10-CM

## 2025-03-26 DIAGNOSIS — M46.1 SACROILIITIS (HCC): Primary | ICD-10-CM

## 2025-03-26 DIAGNOSIS — M46.1 SACROILIITIS (HCC): ICD-10-CM

## 2025-03-26 DIAGNOSIS — M47.816 LUMBAR SPONDYLOSIS: ICD-10-CM

## 2025-03-26 PROCEDURE — 97110 THERAPEUTIC EXERCISES: CPT

## 2025-03-26 PROCEDURE — 97162 PT EVAL MOD COMPLEX 30 MIN: CPT

## 2025-03-26 NOTE — TELEPHONE ENCOUNTER
Jad called reporting a 3 day history of chest and head congestion, fatigue, dry cough, leg cramps, and now today 2 loose stools. Has been taking Tylenol for discomfort. Denies SOB, fever, chest pain, or dizziness. Denies known contact with a covid positive person. Was encouraged to go to  on [address] For further evaluation.   New PT script order

## 2025-03-26 NOTE — TELEPHONE ENCOUNTER
[facility]    Doctor: ALEXANDER Dempsey    Reason for call: Patient declined aqua therapy. Please revise PT referral. Thank you.    Call back#: [phone]

## 2025-03-26 NOTE — PROGRESS NOTES
PT Evaluation     Today's date: 3/26/2025  Patient name: Sam Peacock  : 1955  MRN: [id number]  Referring provider: Ceci Osborn CRNP  Dx:   Encounter Diagnosis     ICD-10-CM    1. Lumbar spondylosis  M47.816 Ambulatory referral to Physical Therapy      2. Spinal stenosis of lumbar region with neurogenic claudication  M48.062 Ambulatory referral to Physical Therapy      3. Sacroiliitis (HCC)  M46.1           Start Time: 0900  Stop Time: 1001  Total time in clinic (min): 61 minutes    Assessment  Impairments: abnormal gait, abnormal or restricted ROM, abnormal movement, impaired balance, impaired physical strength, lacks appropriate home exercise program, pain with function, poor posture , poor body mechanics, participation limitations and endurance  Symptom irritability: moderate    Assessment details: Patient is a 69 year old female presenting to this facility with complaints of low back pain and a hx of spondylosis and stenosis. Recently been experiencing neurogenic claudication. Educated patient on anatomy and pathology of these. Upon evaluation, her signs and symptoms align with referring dx. Patient does like to walk and remain active. She is recently retired and has been limited by the back pain, numbness, and pain into her LE and feet. Upon evaluation, patient presented with decreased lumbar ROM, abdominal strength, postural deficits, and slight deficits in hip strength. Abnormalities noted in gait. Slight leg length discrepancy in supine that changes with long sitting position. Pt does have paraesthesia in ankles and feet. Educated patient on exercise/walking modifications when she experiences the neurogenic claudication such as fwd lean or seated rest and the continuing walk. Recommended frequent activity and positional changes as able. Patient was issued patient education and issued HEP. She would benefit from physical therapy to address deficits noted above in order to decrease pain,  improve mobility, and maximize independence with ADLs and leisure activities.   Understanding of Dx/Px/POC: good     Prognosis: good    Goals  ST.  Decrease pain 50% 6 wk  2.  Increase trunk ROM to minimal-moderate limitations  6 wk  3.  Increase trunk strength to good- 6 wk  4.  Increase BLE strength to 4+/5 6 wk  LT.  Pt will report no pain 12 wk  2.  Increase trunk ROM to WNL 12 wk  3.  Increase trunk strength to  12 wk  4.  Pt will report no limitations with ADL's 12 wk  5.  Pt will report no limitations with ambulation 12 wk      Plan  Patient would benefit from: PT eval and skilled physical therapy  Planned modality interventions: cryotherapy and thermotherapy: hydrocollator packs    Planned therapy interventions: ADL retraining, balance/weight bearing training, body mechanics training, flexibility, functional ROM exercises, graded exercise, home exercise program, manual therapy, neuromuscular re-education, patient/caregiver education, postural training, self care, strengthening, stretching, therapeutic activities and therapeutic exercise    Frequency: 1-2x week  Duration in weeks: 12  Treatment plan discussed with: patient        Subjective Evaluation    History of Present Illness  Mechanism of injury: Patient is a 69 year old female presenting to this facility with chief complaint of bilateral lower back pain with radiation into the bilateral lower extremities. She does have a hx of chronic pain and was referred by pain management. Her script was initially for aqua therapy, however, pt prefers land based due to location. She reports numbness and weakness in the legs as well as heaviness with about 1 mile of ambulation. Relieved with sitting. This has been going on for about 6 months. She also notes pain down both of her legs and in the L leg she gets pain in the anterior portion of her thigh. Sitting will relieve the pain but if she sits a lot then it makes it worse. She reports that bending over  "to stretch her back and touch her toes does make it feel better. She seems to do this so often. She notes just retiring and she likes to do stuff. She is active with her boyfriend, they like to stay busy with activities and she has been limited with this pain.   She denies bowel or bladder incontinence. She does report that she has numbness in saddle region, described as area that would touch a bicycle seat. This seems to be all day but it will ease up at night when sleeping. She does note degeneration in her lumbar spine. She reports history of fibromyalgia. Also has history of diabetes, high cholesterol, hypertension, CKD. She reports using marijuana daily.   Tries to walk at her local park and after about one mile she has to sit due to the numbness in her feet. Prolonged sitting is notably painful. She reports numbness down the back of the legs and into the feet that increases with walking along with heaviness. Prior to this numbness, she denies any numbness to the bottom of her feet. Other than walking, she likes to garden, swim (4x a week with nice weather) and she will start this . Swimming is her favorite activity. Bending over and leaning fwd, rubbing her back does help. Heat and muscle relaxer seem to help, she takes this at night. She has been taking these for a month. No other regular exercise other than the walk.  low back injection and  hip injection.        Patient Goals  Patient goals for therapy: decreased pain, improved balance, increased motion, increased strength, independence with ADLs/IADLs and return to sport/leisure activities  Patient goal: \"keep up with walking, gardening, and other hobbies\"  Pain  Current pain rating: 3  At best pain ratin  At worst pain ratin  Location: middle of low back and down into both legs in the back and LLE groin to knee  Quality: radiating, sharp and discomfort  Relieving factors: heat  Aggravating factors: walking and sitting  Progression: " worsening    Social Support  Steps to enter house: yes  12  Stairs in house: yes   17      Diagnostic Tests  MRI studies: abnormal  Treatments  Previous treatment: injection treatment (injection for the hip)  Current treatment: medication        Objective     Static Posture   General Observations  Tilted left.     Lumbar Spine   Decreased lordosis.     Neurological Testing     Additional Neurological Details  Light touch intact throughout LE bilaterally    Once I reached bilateral ankles, pt reports impaired sensations, paraesthesias     Active Range of Motion     Lumbar   Flexion:  WFL  Extension:  with pain Restriction level: moderate  Left lateral flexion:  Restriction level: moderate  Right lateral flexion:  with pain Restriction level: moderate  Left rotation:  with pain Restriction level: moderate  Right rotation:  with pain Restriction level: moderate    Strength/Myotome Testing     Left Hip   Planes of Motion   Flexion: 4+  Extension: 4  Abduction: 4  Adduction: 4+    Right Hip   Planes of Motion   Flexion: 4  Extension: 4  Abduction: 4  Adduction: 4+    Left Knee   Flexion: 5  Extension: 5    Right Knee   Flexion: 5  Extension: 5    Left Ankle/Foot   Dorsiflexion: 5  Plantar flexion: 5    Right Ankle/Foot   Dorsiflexion: 5  Plantar flexion: 5    Tests     Lumbar   Positive sacroiliac distraction .   Negative SIJ compression.     Left   Negative passive SLR.     Right   Negative passive SLR.     Left Pelvic Girdle/Sacrum   Negative: active SLR test.     Right Pelvic Girdle/Sacrum   Positive: active SLR test (pain in R side low back).     Left Hip   Positive long sit.   Negative VINCE and FADIR.     Right Hip   Negative VINCE.     Additional Tests Details  Increased tightness in R quad with prone knee bend test as compared to L  Pain with L prone knee bend in L side low back     Ambulation     Comments   L trunk lean with ambulation   Decreased trunk rotation and arm swing     Pt notes to feel wobbly with  walking     General Comments:      Lumbar Comments  DKTC- improvement in symptoms   + shopping cart positional response      Discomfort in low back with both bridging, worse with PPT (pain 5/10)      Improvement in symptoms with LTR      LLE slightly longer in supine that improved with long sitting position     Hip Comments   Increased tightness in L hip with passive flx and IR. Pt reports sharp shooting position coming out of passive hip flexion    Increased IR tightness R hip, however worse on L hip       Increased tightness posterior chain bilateral LE              Precautions Hx: DM, HTN, kidney CA (nephrectomy)  HEP: spinal stenosis, spondylosis, [id number]        Manuals PT Eval 3-26-25                                       Neuro Re-Ed         LTR        DKTC        Ball stretch         Pball iso with ab brace        Supine hip flexor stretch on table         Hamstring stretch                 Ther Ex        Nustep        Hip add        Clamshells         Hip marching in hooklying         Pball squats                                         Pt education Edu handout on spondylosis and stenosis  Issued HEP and instructions       Ther Activity                        Gait Training                        Modalities        P

## 2025-04-02 ENCOUNTER — OFFICE VISIT (OUTPATIENT)
Dept: PHYSICAL THERAPY | Facility: CLINIC | Age: 70
End: 2025-04-02
Payer: MEDICARE

## 2025-04-02 DIAGNOSIS — M48.062 SPINAL STENOSIS OF LUMBAR REGION WITH NEUROGENIC CLAUDICATION: ICD-10-CM

## 2025-04-02 DIAGNOSIS — M47.816 LUMBAR SPONDYLOSIS: Primary | ICD-10-CM

## 2025-04-02 DIAGNOSIS — M46.1 SACROILIITIS (HCC): ICD-10-CM

## 2025-04-02 PROCEDURE — 97110 THERAPEUTIC EXERCISES: CPT

## 2025-04-02 PROCEDURE — 97112 NEUROMUSCULAR REEDUCATION: CPT

## 2025-04-02 NOTE — PROGRESS NOTES
"Daily Note     Today's date: 2025  Patient name: Sam Peacock  : 1955  MRN: [id number]  Referring provider: Ceci Osborn CRNP  Dx:   Encounter Diagnosis     ICD-10-CM    1. Lumbar spondylosis  M47.816       2. Spinal stenosis of lumbar region with neurogenic claudication  M48.062       3. Sacroiliitis (HCC)  M46.1                      Subjective: Pt reports that she is going to be honest, she was away all weekend so has not yet worked on the HEP. She did a lot of walking over the weekend and her back was not too bad.       Objective: See treatment diary below      Assessment: Tolerated treatment well. Initialed full program, today and tolerated well. Started with warm up for LE endurance and conditioning on treadmill and pt reported no pain in back or any sx into the legs. Verbal cues provided to ensure proper technique with exercises. L hip pain reported with DKTC, only 5 reps performed. Pt reported to feel good post session.  Patient would benefit from continued PT      Plan: Progress treatment as tolerated.       Precautions Hx: DM, HTN, kidney CA (nephrectomy)  HEP: spinal stenosis, spondylosis, [id number]        Manuals PT Eval 3-26-25 4-2-25                                      Neuro Re-Ed         LTR  10\"x10 yrn      DKTC  10\"x5 (some hip pain in L, held at 5)       Ball stretch   10x10\" fwd, and each side      Pball iso with ab brace  Ab brace mechanics first and then with pball x15 on exhale for 3-5\"      Supine hip flexor stretch on table   With strap 10\"x12 yrn      Hamstring stretch   10\"x12 yrn edge of table               Ther Ex        Nustep/treadmill  Speed 2 10' on treadmill       Hip add  5\"x20       Clamshells   Green hooklying 3x10       Hip marching in hooklying         Pball squats                                         Pt education Edu handout on spondylosis and stenosis  Issued HEP and instructions Provided with green tband for clamshells at home       Ther Activity         "                Gait Training                        Modalities        [facility]

## 2025-04-04 ENCOUNTER — APPOINTMENT (OUTPATIENT)
Dept: PHYSICAL THERAPY | Facility: CLINIC | Age: 70
End: 2025-04-04
Payer: COMMERCIAL

## 2025-04-08 ENCOUNTER — HOSPITAL ENCOUNTER (OUTPATIENT)
Dept: RADIOLOGY | Facility: CLINIC | Age: 70
Discharge: HOME | End: 2025-04-08
Payer: MEDICARE

## 2025-04-08 VITALS
RESPIRATION RATE: 20 BRPM | DIASTOLIC BLOOD PRESSURE: 77 MMHG | OXYGEN SATURATION: 98 % | HEART RATE: 67 BPM | SYSTOLIC BLOOD PRESSURE: 137 MMHG

## 2025-04-08 DIAGNOSIS — M25.552 LEFT HIP PAIN: ICD-10-CM

## 2025-04-08 PROCEDURE — 77002 NEEDLE LOCALIZATION BY XRAY: CPT

## 2025-04-08 RX ORDER — ROPIVACAINE HYDROCHLORIDE 2 MG/ML
4 INJECTION, SOLUTION EPIDURAL; INFILTRATION; PERINEURAL ONCE
Status: COMPLETED | OUTPATIENT
Start: 2025-04-08 | End: 2025-04-08

## 2025-04-08 RX ORDER — METHYLPREDNISOLONE ACETATE 80 MG/ML
80 INJECTION, SUSPENSION INTRA-ARTICULAR; INTRALESIONAL; INTRAMUSCULAR; PARENTERAL; SOFT TISSUE ONCE
Status: COMPLETED | OUTPATIENT
Start: 2025-04-08 | End: 2025-04-08

## 2025-04-08 RX ADMIN — ROPIVACAINE HYDROCHLORIDE 4 ML: 2 INJECTION, SOLUTION EPIDURAL; INFILTRATION at 11:51

## 2025-04-08 RX ADMIN — IOHEXOL 1 ML: 300 INJECTION, SOLUTION INTRAVENOUS at 11:51

## 2025-04-08 RX ADMIN — METHYLPREDNISOLONE ACETATE 80 MG: 80 INJECTION, SUSPENSION INTRA-ARTICULAR; INTRALESIONAL; INTRAMUSCULAR; SOFT TISSUE at 11:51

## 2025-04-08 NOTE — INTERVAL H&P NOTE
Update: (This section must be completed if the H&P was completed greater than 24 hrs to procedure or admission)    H&P reviewed. After examining the patient, I find no changed to the H&P since it had been written.    Patient re-evaluated. Accept as history and physical.    Wallace Stanford MD/April 8, 2025/11:38 AM

## 2025-04-08 NOTE — DISCHARGE INSTR - LAB

## 2025-04-09 ENCOUNTER — OFFICE VISIT (OUTPATIENT)
Dept: PHYSICAL THERAPY | Facility: CLINIC | Age: 70
End: 2025-04-09
Payer: MEDICARE

## 2025-04-09 DIAGNOSIS — M47.816 LUMBAR SPONDYLOSIS: Primary | ICD-10-CM

## 2025-04-09 DIAGNOSIS — M46.1 SACROILIITIS (HCC): ICD-10-CM

## 2025-04-09 DIAGNOSIS — M48.062 SPINAL STENOSIS OF LUMBAR REGION WITH NEUROGENIC CLAUDICATION: ICD-10-CM

## 2025-04-09 PROCEDURE — 97110 THERAPEUTIC EXERCISES: CPT

## 2025-04-09 PROCEDURE — 97112 NEUROMUSCULAR REEDUCATION: CPT

## 2025-04-09 NOTE — PROGRESS NOTES
"Daily Note     Today's date: 2025  Patient name: Sam Peacock  : 1955  MRN: [id number]  Referring provider: Ceci Osobrn CRNP  Dx:   Encounter Diagnosis     ICD-10-CM    1. Lumbar spondylosis  M47.816       2. Spinal stenosis of lumbar region with neurogenic claudication  M48.062       3. Sacroiliitis (HCC)  M46.1                      Subjective: Pt reports that's he had an injection into her left him yesterday so she is a little sore. She is still getting the numbness into her feet and back pain with walking. Relieved with forward bending but this does not last as long as she would like. She noted by the end of 2 miles she is ready to fall over. She does have a back injection scheduled for next week. They were discussing cortisone injection vs. Epidural.       Objective: See treatment diary below      Assessment: Tolerated treatment well. Pt tolerated all exercises well. VC provided to ensure proper mechanics. Pt reported the ball stretch felt good for her at the end of the session. Tightness continues with hip flexor/quad stretch. Continue to progress as able. Patient would benefit from continued PT      Plan: Progress treatment as tolerated.       Precautions Hx: DM, HTN, kidney CA (nephrectomy)  HEP: spinal stenosis, spondylosis, [id number]        Manuals PT Eval 3-26-25 4-2-25 4-9-25                                     Neuro Re-Ed         LTR  10\"x10 yrn 10\"x10 yrn     DKTC  10\"x5 (some hip pain in L, held at 5)  8x10\" DKTC     Ball stretch   10x10\" fwd, and each side 10x10\" fwd, and each side     Pball iso with ab brace  Ab brace mechanics first and then with pball x15 on exhale for 3-5\" Ab brace mechanics first and then with pball x15 on exhale for 3-5\"     Supine hip flexor stretch on table   With strap 10\"x12 yrn With strap 10\"x12 yrn     Hamstring stretch   10\"x12 yrn edge of table  10\"x12 yrn edge of table             Ther Ex        Nustep/treadmill  Speed 2 10' on treadmill  Speed 2 10' " "on treadmill      Hip add  5\"x20  5\" x10      Clamshells   Green hooklying 3x10  Green hooklying 3x10      Hip marching in hooklying         Pball squats                                         Pt education Edu handout on spondylosis and stenosis  Issued HEP and instructions Provided with green tband for clamshells at home       Ther Activity                        Gait Training                        Modalities        [facility]                         "

## 2025-04-10 ENCOUNTER — APPOINTMENT (OUTPATIENT)
Dept: RADIOLOGY | Facility: CLINIC | Age: 70
End: 2025-04-10
Payer: MEDICARE

## 2025-04-10 ENCOUNTER — OFFICE VISIT (OUTPATIENT)
Dept: OBGYN CLINIC | Facility: CLINIC | Age: 70
End: 2025-04-10
Payer: MEDICARE

## 2025-04-10 VITALS — HEIGHT: 63 IN | WEIGHT: 141.8 LBS | BODY MASS INDEX: 25.12 KG/M2

## 2025-04-10 DIAGNOSIS — M16.12 PRIMARY OSTEOARTHRITIS OF ONE HIP, LEFT: Primary | ICD-10-CM

## 2025-04-10 DIAGNOSIS — M16.12 PRIMARY OSTEOARTHRITIS OF ONE HIP, LEFT: ICD-10-CM

## 2025-04-10 PROCEDURE — 73502 X-RAY EXAM HIP UNI 2-3 VIEWS: CPT

## 2025-04-10 PROCEDURE — 99214 OFFICE O/P EST MOD 30 MIN: CPT | Performed by: ORTHOPAEDIC SURGERY

## 2025-04-10 NOTE — ASSESSMENT & PLAN NOTE
Mild Improvement with previous IA cortisone injection of left hip, however persistent symptoms and injections not lasting pain relief option, affecting ADLs and quality of life  Pain affecting hip for 50 years   Lumbar spine pathology currently being treated with Dr Stanford  Weight bearing as tolerated left lower extremity   Will continue to monitor symptoms closely   Continue treatment with Dr Stanford   Over the counter analgesics as needed / directed   Ice / heat as directed   Patient interested in anterior MAK.  Patient would like to proceed with surgery in November 2025 , will return in the fall for labs, consent, surgical discussion  Follow up 5 months       Orders:    XR hip/pelv 2-3 vws left if performed; Future

## 2025-04-10 NOTE — PROGRESS NOTES
Name: Sam Peacock      : 1955       MRN: [id number]   Encounter Provider: Lavon Patton MD   Encounter Date: 04/10/25  Encounter department: [facility] [place]     Assessment & Plan  Primary osteoarthritis of one hip, left  Mild Improvement with previous IA cortisone injection of left hip, however persistent symptoms and injections not lasting pain relief option, affecting ADLs and quality of life  Pain affecting hip for 50 years   Lumbar spine pathology currently being treated with Dr Stanford  Weight bearing as tolerated left lower extremity   Will continue to monitor symptoms closely   Continue treatment with Dr Stanford   Over the counter analgesics as needed / directed   Ice / heat as directed   Patient interested in anterior MAK.  Patient would like to proceed with surgery in 2025 , will return in the fall for labs, consent, surgical discussion  Follow up 5 months       Orders:    XR hip/pelv 2-3 vws left if performed; Future      To Do Next Visit:  Discuss anterior MAK,  pre-op planning   _____________________________________________________  CHIEF COMPLAINT:  Chief Complaint   Patient presents with    Left Hip - Follow-up         SUBJECTIVE:  Sam Peacock is a 69 y.o. female who presents to the office for a follow-up evaluation of her left hip.  Patient underwent an intra-articular injection of the left hip in December which provided approximately 3 months of relief.  Patient states that she underwent an additional intra-articular injection of the left hip on.  Patient states that she has received minimal relief of symptoms as of yet.  Patient states that she does continue to have pain in the groin and on the lateral aspect of the leg that travels down the leg into the knee.  Patient denies any pain extending past her knee.  Patient has been taking over-the-counter pain medication.  Patient is currently being treated by Dr. Stanford for lumbar  spine pathology and is scheduled for a GUNNER on the lumbar spine.  Patient offers no other complaints at this time.      PAST MEDICAL HISTORY:  Past Medical History:   Diagnosis Date    Arthritis     Cancer (HCC) 03/30/2016    Rt Kidney Removed    Chronic kidney disease     Diabetes mellitus (HCC)     Diabetes type 2, controlled (HCC)     Fallopian tube disorder     fills with fluid    Hypertension     Kidney cysts     Multiple thyroid nodules     Osteoarthritis     Stage 3 chronic kidney disease (HCC)        PAST SURGICAL HISTORY:  Past Surgical History:   Procedure Laterality Date    KIDNEY SURGERY Right     removal    TUBAL LIGATION Right     WRIST SURGERY Bilateral     basal joint soft tissue interpositional arthroplast L thumb 04/22 , 01/17/2024       FAMILY HISTORY:  Family History   Problem Relation Age of Onset    Heart disease Mother     Hypertension Mother     Hypertension Father     Diabetes Maternal Grandfather     Lung cancer Brother     Hypertension Brother     BRCA2 Positive Cousin     BRCA1 Positive Cousin     Breast cancer Cousin         maternal       SOCIAL HISTORY:  Social History     Tobacco Use    Smoking status: Never     Passive exposure: Never    Smokeless tobacco: Never   Vaping Use    Vaping status: Never Used   Substance Use Topics    Alcohol use: Never    Drug use: Yes     Types: Marijuana       MEDICATIONS:    Current Outpatient Medications:     allopurinol (ZYLOPRIM) 300 mg tablet, take 1 tablet by mouth once daily, Disp: 100 tablet, Rfl: 1    aspirin 81 mg chewable tablet, Chew 81 mg in the morning, Disp: , Rfl:     atenolol (TENORMIN) 50 mg tablet, take 1 tablet by mouth every morning, Disp: 90 tablet, Rfl: 1    atorvastatin (LIPITOR) 10 mg tablet, Take 1 tablet (10 mg total) by mouth every morning, Disp: 100 tablet, Rfl: 1    Cetirizine-Pseudoephedrine (ZYRTEC-D ALLERGY & CONGESTION PO), as needed, Disp: , Rfl:     cholecalciferol (VITAMIN D3) 25 mcg (1,000 units) tablet, 2,000 Units  "daily, Disp: , Rfl:     DULoxetine (CYMBALTA) 60 mg delayed release capsule, take 1 capsule by mouth once daily, Disp: 30 capsule, Rfl: 5    fluticasone (FLONASE) 50 mcg/act nasal spray, 2 sprays into each nostril daily, Disp: 51 mL, Rfl: 3    Insulin Pen Needle (Droplet Pen Needles) 32G X 4 MM MISC, USE 3 TIMES A DAY AS DIRECTED, Disp: 100 each, Rfl: 5    Lancets (OneTouch Delica Plus [id number]) MISC, use 1 LANCET to TEST BLOOD SUGAR once daily, Disp: 100 each, Rfl: 1    liraglutide (VICTOZA) injection, inject 0.6 milligram subcutaneously daily, Disp: 6 mL, Rfl: 5    Omega-3 Fatty Acids (fish oil) 1,000 mg, , Disp: , Rfl:     OneTouch Ultra test strip, TEST 3 TIMES A DAY AS DIRECTED, Disp: 100 strip, Rfl: 1    tiZANidine (ZANAFLEX) 4 mg tablet, Take 1 tablet (4 mg total) by mouth daily at bedtime as needed for muscle spasms, Disp: 90 tablet, Rfl: 0    Calcium Carbonate-Vit D-Min (CALCIUM 1200 PO), Take 1,200 mg by mouth daily (Patient not taking: Reported on 2/4/2025), Disp: , Rfl:     ALLERGIES:  Allergies   Allergen Reactions    Nsaids GI Intolerance     Chronic Kidney disease-right total nephrectomy    Pollen Extract Sneezing       LABS:  HgA1c:   Lab Results   Component Value Date    HGBA1C 5.9 (H) 01/22/2025     BMP:   Lab Results   Component Value Date    CALCIUM 9.9 01/22/2025    K 4.7 01/22/2025    CO2 27 01/22/2025     01/22/2025    BUN 19 01/22/2025    CREATININE 1.15 01/22/2025     CBC: No components found for: \"CBC\"    _____________________________________________________  PHYSICAL EXAMINATION:  Vital signs: Ht 5' 3\" (1.6 m)   Wt 64.3 kg (141 lb 12.8 oz)   BMI 25.12 kg/m²   General: No acute distress, awake and alert  Psychiatric: Mood and affect appear appropriate  HEENT: Trachea Midline, No torticollis, no apparent facial trauma  Cardiovascular: No audible murmurs; Extremities appear perfused  Pulmonary: No audible wheezing or stridor  Skin: No open lesions; see further details (if any) " below      MUSCULOSKELETAL EXAMINATION:  Left hip examination :  Patient sitting comfortably in the office in no apparent distress    no acute visible abnormalities present.  No bony or soft tissue tenderness to palpation noted at this time.  Limited range of motion of the hip in all planes of motion secondary to hip pain  NV intact    _____________________________________________________  STUDIES REVIEWED:  I personally reviewed the images obtained in office today and my independent interpretation is as follows:  Left hip XR 2 views :  Moderate to severe left osteoarthritis with no acute osseous abnormalities present.        PROCEDURES PERFORMED:  No procedures were performed at this time.                     Brian Olvera PA-C - assisting  Lavon Patton MD

## 2025-04-11 ENCOUNTER — OFFICE VISIT (OUTPATIENT)
Dept: PHYSICAL THERAPY | Facility: CLINIC | Age: 70
End: 2025-04-11
Payer: MEDICARE

## 2025-04-11 DIAGNOSIS — M46.1 SACROILIITIS (HCC): ICD-10-CM

## 2025-04-11 DIAGNOSIS — M48.062 SPINAL STENOSIS OF LUMBAR REGION WITH NEUROGENIC CLAUDICATION: ICD-10-CM

## 2025-04-11 DIAGNOSIS — M47.816 LUMBAR SPONDYLOSIS: Primary | ICD-10-CM

## 2025-04-11 PROCEDURE — 97110 THERAPEUTIC EXERCISES: CPT

## 2025-04-11 PROCEDURE — 97112 NEUROMUSCULAR REEDUCATION: CPT

## 2025-04-11 NOTE — PROGRESS NOTES
"Daily Note     Today's date: 2025  Patient name: Sam Peacock  : 1955  MRN: [id number]  Referring provider: Ceci Osborn CRNP  Dx:   Encounter Diagnosis     ICD-10-CM    1. Lumbar spondylosis  M47.816       2. Spinal stenosis of lumbar region with neurogenic claudication  M48.062       3. Sacroiliitis (HCC)  M46.1                      Subjective: Pt reports that she felt good after last session and she is feeling good today.       Objective: See treatment diary below      Assessment: Tolerated treatment well. Added in 90-90 hold to improve core strength. Minimal cues provided to ensure proper technique throughout session. Pt reported to feel good post session. She is going away next week but will return after. Patient would benefit from continued PT      Plan: Progress treatment as tolerated.       Precautions Hx: DM, HTN, kidney CA (nephrectomy)  HEP: spinal stenosis, spondylosis, [id number]        Manuals PT Eval 3-26-25 4-2-25 4-9-25 4-11-25                                    Neuro Re-Ed         LTR  10\"x10 yrn 10\"x10 yrn 10\"x10 yrn    DKTC  10\"x5 (some hip pain in L, held at 5)  8x10\" DKTC 8x10\" DKTC    Ball stretch   10x10\" fwd, and each side 10x10\" fwd, and each side 10x10\" fwd, and each side    Pball iso with ab brace  Ab brace mechanics first and then with pball x15 on exhale for 3-5\" Ab brace mechanics first and then with pball x15 on exhale for 3-5\" Ab brace 5x then add ball for x20 on exhale     Supine hip flexor stretch on table   With strap 10\"x12 yrn With strap 10\"x12 yrn With strap 10\"x12 yrn    Hamstring stretch   10\"x12 yrn edge of table  10\"x12 yrn edge of table 10\"x12 yrn edge of table            Ther Ex        Nustep/treadmill  Speed 2 10' on treadmill  Speed 2 10' on treadmill  Speed 2 10' on treadmill     Hip add  5\"x20  5\" x10  5\"x20    Clamshells   Green hooklying 3x10  Green hooklying 3x10  Blue hooklying 3x10     Hip marching in hooklying         Pball squats         Ab " brace with 90-90 hold                                 Pt education Edu handout on spondylosis and stenosis  Issued HEP and instructions Provided with green tband for clamshells at home       Ther Activity                        Gait Training                        Modalities        P

## 2025-04-22 ENCOUNTER — HOSPITAL ENCOUNTER (OUTPATIENT)
Dept: RADIOLOGY | Facility: CLINIC | Age: 70
Discharge: HOME | End: 2025-04-22
Payer: MEDICARE

## 2025-04-22 VITALS
HEART RATE: 59 BPM | DIASTOLIC BLOOD PRESSURE: 86 MMHG | RESPIRATION RATE: 20 BRPM | OXYGEN SATURATION: 99 % | SYSTOLIC BLOOD PRESSURE: 142 MMHG

## 2025-04-22 DIAGNOSIS — M48.062 SPINAL STENOSIS OF LUMBAR REGION WITH NEUROGENIC CLAUDICATION: ICD-10-CM

## 2025-04-22 PROCEDURE — 62323 NJX INTERLAMINAR LMBR/SAC: CPT | Performed by: STUDENT IN AN ORGANIZED HEALTH CARE EDUCATION/TRAINING PROGRAM

## 2025-04-22 RX ORDER — METHYLPREDNISOLONE ACETATE 80 MG/ML
80 INJECTION, SUSPENSION INTRA-ARTICULAR; INTRALESIONAL; INTRAMUSCULAR; PARENTERAL; SOFT TISSUE ONCE
Status: COMPLETED | OUTPATIENT
Start: 2025-04-22 | End: 2025-04-22

## 2025-04-22 RX ADMIN — IOHEXOL 1 ML: 300 INJECTION, SOLUTION INTRAVENOUS at 09:11

## 2025-04-22 RX ADMIN — METHYLPREDNISOLONE ACETATE 80 MG: 80 INJECTION, SUSPENSION INTRA-ARTICULAR; INTRALESIONAL; INTRAMUSCULAR; SOFT TISSUE at 09:11

## 2025-04-22 NOTE — H&P
History of Present Illness: The patient is a 69 y.o. female who presents with complaints of back and leg pain    Past Medical History:   Diagnosis Date    Arthritis     Cancer (HCC) 03/30/2016    Rt Kidney Removed    Chronic kidney disease     Diabetes mellitus (HCC)     Diabetes type 2, controlled (HCC)     Fallopian tube disorder     fills with fluid    Hypertension     Kidney cysts     Multiple thyroid nodules     Osteoarthritis     Stage 3 chronic kidney disease (HCC)        Past Surgical History:   Procedure Laterality Date    KIDNEY SURGERY Right     removal    TUBAL LIGATION Right     WRIST SURGERY Bilateral     basal joint soft tissue interpositional arthroplast L thumb 04/22 , 01/17/2024         Current Outpatient Medications:     allopurinol (ZYLOPRIM) 300 mg tablet, take 1 tablet by mouth once daily, Disp: 100 tablet, Rfl: 1    aspirin 81 mg chewable tablet, Chew 81 mg in the morning, Disp: , Rfl:     atenolol (TENORMIN) 50 mg tablet, take 1 tablet by mouth every morning, Disp: 90 tablet, Rfl: 1    atorvastatin (LIPITOR) 10 mg tablet, Take 1 tablet (10 mg total) by mouth every morning, Disp: 100 tablet, Rfl: 1    Calcium Carbonate-Vit D-Min (CALCIUM 1200 PO), Take 1,200 mg by mouth daily (Patient not taking: Reported on 2/4/2025), Disp: , Rfl:     Cetirizine-Pseudoephedrine (ZYRTEC-D ALLERGY & CONGESTION PO), as needed, Disp: , Rfl:     cholecalciferol (VITAMIN D3) 25 mcg (1,000 units) tablet, 2,000 Units daily, Disp: , Rfl:     DULoxetine (CYMBALTA) 60 mg delayed release capsule, take 1 capsule by mouth once daily, Disp: 30 capsule, Rfl: 5    fluticasone (FLONASE) 50 mcg/act nasal spray, 2 sprays into each nostril daily, Disp: 51 mL, Rfl: 3    Insulin Pen Needle (Droplet Pen Needles) 32G X 4 MM MISC, USE 3 TIMES A DAY AS DIRECTED, Disp: 100 each, Rfl: 5    Lancets (OneTouch Delica Plus [id number]) MISC, use 1 LANCET to TEST BLOOD SUGAR once daily, Disp: 100 each, Rfl: 1    liraglutide (VICTOZA) injection,  inject 0.6 milligram subcutaneously daily, Disp: 6 mL, Rfl: 5    Omega-3 Fatty Acids (fish oil) 1,000 mg, , Disp: , Rfl:     OneTouch Ultra test strip, TEST 3 TIMES A DAY AS DIRECTED, Disp: 100 strip, Rfl: 1    tiZANidine (ZANAFLEX) 4 mg tablet, Take 1 tablet (4 mg total) by mouth daily at bedtime as needed for muscle spasms, Disp: 90 tablet, Rfl: 0    Allergies   Allergen Reactions    Nsaids GI Intolerance     Chronic Kidney disease-right total nephrectomy    Pollen Extract Sneezing       Physical Exam: There were no vitals filed for this visit.  General: Awake, Alert, Oriented x 3, Mood and affect appropriate  Respiratory: Respirations even and unlabored  Cardiovascular: Peripheral pulses intact; no edema  Musculoskeletal Exam: back non erythematous no lesions    ASA Score: 3         Assessment:   1. Spinal stenosis of lumbar region with neurogenic claudication        Plan: L4-L5 LESI

## 2025-04-22 NOTE — DISCHARGE INSTR - LAB
Epidural Steroid Injection   WHAT YOU NEED TO KNOW:   An epidural steroid injection (GUNNER) is a procedure to inject steroid medicine into the epidural space. The epidural space is between your spinal cord and vertebrae. Steroids reduce inflammation and fluid buildup in your spine that may be causing pain. You may be given pain medicine along with the steroids.          ACTIVITY  Do not drive or operate machinery today.  No strenuous activity today - bending, lifting, etc.  You may resume normal activites starting tomorrow - start slowly and as tolerated.  You may shower today, but no tub baths or hot tubs.  You may have numbness for several hours from the local anesthetic. Please use caution and common sense, especially with weight-bearing activities.    CARE OF THE INJECTION SITE  If you have soreness or pain, apply ice to the area today (20 minutes on/20 minutes off).  Starting tomorrow, you may use warm, moist heat or ice if needed.  You may have an increase or change in your discomfort for 36-48 hours after your treatment.  Apply ice and continue with any pain medication you have been prescribed.  Notify the [facility] if you have any of the following: redness, drainage, swelling, headache, stiff neck or fever above 100°F.    SPECIAL INSTRUCTIONS  Our office will contact you in approximately 14 days for a progress report.    MEDICATIONS  Continue to take all routine medications.  Our office may have instructed you to hold some medications.    As no general anesthesia was used in today's procedure, you should not experience any side effects related to anesthesia.     If you are diabetic, the steroids used in today's injection may temporarily increase your blood sugar levels after the first few days after your injection. Please keep a close eye on your sugars and alert the doctor who manages your diabetes if your sugars are significantly high from your baseline or you are symptomatic.     If you have a  problem specifically related to your procedure, please call our office at [phone].  Problems not related to your procedure should be directed to your primary care physician.

## 2025-04-23 ENCOUNTER — APPOINTMENT (OUTPATIENT)
Dept: PHYSICAL THERAPY | Facility: CLINIC | Age: 70
End: 2025-04-23
Payer: COMMERCIAL

## 2025-04-25 ENCOUNTER — OFFICE VISIT (OUTPATIENT)
Dept: PHYSICAL THERAPY | Facility: CLINIC | Age: 70
End: 2025-04-25
Payer: COMMERCIAL

## 2025-04-25 DIAGNOSIS — M46.1 SACROILIITIS (HCC): ICD-10-CM

## 2025-04-25 DIAGNOSIS — M48.062 SPINAL STENOSIS OF LUMBAR REGION WITH NEUROGENIC CLAUDICATION: ICD-10-CM

## 2025-04-25 DIAGNOSIS — M47.816 LUMBAR SPONDYLOSIS: Primary | ICD-10-CM

## 2025-04-25 PROCEDURE — 97112 NEUROMUSCULAR REEDUCATION: CPT

## 2025-04-25 PROCEDURE — 97110 THERAPEUTIC EXERCISES: CPT
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
## 2025-04-30 ENCOUNTER — OFFICE VISIT (OUTPATIENT)
Dept: PHYSICAL THERAPY | Facility: CLINIC | Age: 70
End: 2025-04-30
Payer: COMMERCIAL

## 2025-04-30 DIAGNOSIS — M48.062 SPINAL STENOSIS OF LUMBAR REGION WITH NEUROGENIC CLAUDICATION: ICD-10-CM

## 2025-04-30 DIAGNOSIS — M46.1 SACROILIITIS (HCC): ICD-10-CM

## 2025-04-30 DIAGNOSIS — M47.816 LUMBAR SPONDYLOSIS: Primary | ICD-10-CM

## 2025-04-30 PROCEDURE — 97112 NEUROMUSCULAR REEDUCATION: CPT

## 2025-04-30 PROCEDURE — 97110 THERAPEUTIC EXERCISES: CPT

## 2025-04-30 NOTE — PROGRESS NOTES
"Daily Note     Today's date: 2025  Patient name: Sam Peacock  : 1955  MRN: [id number]  Referring provider: Ceci Osborn CRNP  Dx:   Encounter Diagnosis     ICD-10-CM    1. Lumbar spondylosis  M47.816       2. Spinal stenosis of lumbar region with neurogenic claudication  M48.062       3. Sacroiliitis (HCC)  M46.1                      Subjective: Pt reports that she is doing very well, her back is feeling good today. She went walking yesterday and was able to get in 3 whole laps without stopping and bending forward to take pressure off of her back. She was having a hard time just doing one lap when we first started. She is feeling encouraged.       Objective: See treatment diary below      Assessment: Tolerated treatment well. Pt tolerated all exercises well. Minimal verbal cues provided to ensure proper technique and mechanics with PPT and core exercises. Patient would benefit from continued PT      Plan: Progress treatment as tolerated.       Precautions Hx: DM, HTN, kidney CA (nephrectomy)  HEP: spinal stenosis, spondylosis, [id number]        Manuals 25                                   Neuro Re-Ed         LTR 10\"x10 yrn 10\"x10 yrn 10\"x10 yrn 10\"x10 yrn 10\"x10 yrn   PPT 2x10 5\"    2x10 3\"   DKTC 12x10\" DKTC 10\"x5 (some hip pain in L, held at 5)  8x10\" DKTC 8x10\" DKTC 12x10\" DKTC   Ball stretch  10x10\" fwd, and each side 10x10\" fwd, and each side 10x10\" fwd, and each side 10x10\" fwd, and each side 10x10\" fwd, and each side   Pball iso with ab brace 5\"x20 Ab brace mechanics first and then with pball x15 on exhale for 3-5\" Ab brace mechanics first and then with pball x15 on exhale for 3-5\" Ab brace 5x then add ball for x20 on exhale  X15-20 3-5\" press in to pball with ab iso   Supine hip flexor stretch on table  With strap 10\"x12 yrn With strap 10\"x12 yrn With strap 10\"x12 yrn With strap 10\"x12 yrn With strap 10\"x12 yrn   Hamstring stretch  10\"x12 yrn edge of " "table 10\"x12 yrn edge of table  10\"x12 yrn edge of table 10\"x12 yrn edge of table 10\"x12 yrn edge of table           Ther Ex        Nustep/treadmill Speed 2 10' on treadmill  Speed 2 10' on treadmill  Speed 2 10' on treadmill  Speed 2 10' on treadmill  Speed 2 10' on treadmill    Hip add 5\"x20 5\"x20  5\" x10  5\"x20 5\"x20   Clamshells  Blue hooklying 3x10 yrn Green hooklying 3x10  Green hooklying 3x10  Blue hooklying 3x10  Blue hooklying 3x10    Hip marching in hooklying         Pball squats  2x10 with cues     2x10 with cues    Ab brace with 90-90 hold         Paloff press Dbl green x20 yrn    Dbl green x20 yrn                   Pt education  Provided with green tband for clamshells at home       Ther Activity                        Gait Training                        Modalities        MHP                               "

## 2025-05-02 ENCOUNTER — OFFICE VISIT (OUTPATIENT)
Dept: PHYSICAL THERAPY | Facility: CLINIC | Age: 70
End: 2025-05-02
Payer: COMMERCIAL

## 2025-05-02 DIAGNOSIS — M48.062 SPINAL STENOSIS OF LUMBAR REGION WITH NEUROGENIC CLAUDICATION: ICD-10-CM

## 2025-05-02 DIAGNOSIS — M46.1 SACROILIITIS (HCC): ICD-10-CM

## 2025-05-02 DIAGNOSIS — M47.816 LUMBAR SPONDYLOSIS: Primary | ICD-10-CM

## 2025-05-02 PROCEDURE — 97112 NEUROMUSCULAR REEDUCATION: CPT

## 2025-05-02 PROCEDURE — 97110 THERAPEUTIC EXERCISES: CPT

## 2025-05-02 NOTE — PROGRESS NOTES
"Daily Note     Today's date: 2025  Patient name: Sam Peacock  : 1955  MRN: [id number]  Referring provider: Ceci Osborn CRNP  Dx:   Encounter Diagnosis     ICD-10-CM    1. Lumbar spondylosis  M47.816       2. Spinal stenosis of lumbar region with neurogenic claudication  M48.062       3. Sacroiliitis (HCC)  M46.1                      Subjective: Pt reports that she is feeling really good today.      Objective: See treatment diary below      Assessment: Tolerated treatment well. Pt did have L hip pain with DKTC today, although she reports this feels really good for her low back. Lowered leg separately for this and improved the discomfort. Pt reports the paloff press is a challenge for her. She reported to feel good post session. Patient would benefit from continued PT      Plan: Progress treatment as tolerated.       Precautions Hx: DM, HTN, kidney CA (nephrectomy)  HEP: spinal stenosis, spondylosis, [id number]        Manuals 25                                   Neuro Re-Ed         LTR 10\"x10 yrn 10\"x10 yrn 10\"x10 yrn 10\"x10 yrn 10\"x10 yrn   PPT 2x10 5\" X15 5\"   2x10 3\"   DKTC 12x10\" DKTC 12x10\" DKTC 8x10\" DKTC 8x10\" DKTC 12x10\" DKTC   Ball stretch  10x10\" fwd, and each side 10x10\" fwd, and each side 10x10\" fwd, and each side 10x10\" fwd, and each side 10x10\" fwd, and each side   Pball iso with ab brace 5\"x20 5\"x20 with slight crunch lifting head  Ab brace mechanics first and then with pball x15 on exhale for 3-5\" Ab brace 5x then add ball for x20 on exhale  X15-20 3-5\" press in to pball with ab iso   Supine hip flexor stretch on table  With strap 10\"x12 yrn With strap 10\"x12 yrn With strap 10\"x12 yrn With strap 10\"x12 yrn With strap 10\"x12 yrn   Hamstring stretch  10\"x12 yrn edge of table 10\"x12 yrn edge of table 10\"x12 yrn edge of table 10\"x12 yrn edge of table 10\"x12 yrn edge of table           Ther Ex        Nustep/treadmill Speed 2 10' on treadmill  Speed " "2-2.6 10' on treadmill  Speed 2 10' on treadmill  Speed 2 10' on treadmill  Speed 2 10' on treadmill    Hip add 5\"x20 5\"x20 5\" x10  5\"x20 5\"x20   Clamshells  Blue hooklying 3x10 yrn Blue sidelying 3x10 yrn Green hooklying 3x10  Blue hooklying 3x10  Blue hooklying 3x10    Hip marching in hooklying         Pball squats  2x10 with cues  3x10 with cues    2x10 with cues    Ab brace with 90-90 hold         Paloff press Dbl green x20 yrn Dbl green x20 yrn   Dbl green x20 yrn                   Pt education        Ther Activity                        Gait Training                        Modalities        MHP                                 "

## 2025-05-05 ENCOUNTER — TELEPHONE (OUTPATIENT)
Age: 70
End: 2025-05-05

## 2025-05-05 NOTE — TELEPHONE ENCOUNTER
Caller: Patient     Doctor: Dr. Stanford     Reason for call: Patient had mri on 3/11 and states the billed the medicare but should have gone through her [facility] insurance.    Please assist     Call back#: [phone]       [facility] sent to auth team

## 2025-05-06 ENCOUNTER — TELEPHONE (OUTPATIENT)
Dept: PAIN MEDICINE | Facility: CLINIC | Age: 70
End: 2025-05-06

## 2025-05-07 ENCOUNTER — OFFICE VISIT (OUTPATIENT)
Dept: PHYSICAL THERAPY | Facility: CLINIC | Age: 70
End: 2025-05-07
Payer: MEDICARE

## 2025-05-07 DIAGNOSIS — M46.1 SACROILIITIS (HCC): ICD-10-CM

## 2025-05-07 DIAGNOSIS — M48.062 SPINAL STENOSIS OF LUMBAR REGION WITH NEUROGENIC CLAUDICATION: ICD-10-CM

## 2025-05-07 DIAGNOSIS — M47.816 LUMBAR SPONDYLOSIS: Primary | ICD-10-CM

## 2025-05-07 PROCEDURE — 97110 THERAPEUTIC EXERCISES: CPT

## 2025-05-07 PROCEDURE — 97112 NEUROMUSCULAR REEDUCATION: CPT

## 2025-05-07 NOTE — PROGRESS NOTES
"Daily Note     Today's date: 2025  Patient name: Sam ePacock  : 1955  MRN: [id number]  Referring provider: Ceci Osborn CRNP  Dx:   Encounter Diagnosis     ICD-10-CM    1. Lumbar spondylosis  M47.816       2. Spinal stenosis of lumbar region with neurogenic claudication  M48.062       3. Sacroiliitis (HCC)  M46.1                      Subjective: Pt reports that she is still feeling good in regards to her back.       Objective: See treatment diary below      Assessment: Tolerated treatment well. Pt reported no pain with exercises today. She continues to reports that ball squats feel good for her. Minimal cues provided to ensure proper core activation with pball iso. Pt reported to feel fine post session, still feels good with pball roll out for lumbar stretch. Patient would benefit from continued PT      Plan: Progress treatment as tolerated.       Precautions Hx: DM, HTN, kidney CA (nephrectomy)  HEP: spinal stenosis, spondylosis, [id number]        Manuals 25                                   Neuro Re-Ed         LTR 10\"x10 yrn 10\"x10 yrn 10\"x10 yrn 10\"x10 yrn 10\"x10 yrn   PPT 2x10 5\" X15 5\" X15 5\"  2x10 3\"   DKTC 12x10\" DKTC 12x10\" DKTC 12x10\" DKTC 8x10\" DKTC 12x10\" DKTC   Ball stretch  10x10\" fwd, and each side 10x10\" fwd, and each side 10x10\" fwd, and each side 10x10\" fwd, and each side 10x10\" fwd, and each side   Pball iso with ab brace 5\"x20 5\"x20 with slight crunch lifting head  5\"x20 with slight crunch lifting head  Ab brace 5x then add ball for x20 on exhale  X15-20 3-5\" press in to pball with ab iso   Supine hip flexor stretch on table  With strap 10\"x12 yrn With strap 10\"x12 yrn With strap 10\"x12 yrn With strap 10\"x12 yrn With strap 10\"x12 yrn   Hamstring stretch  10\"x12 yrn edge of table 10\"x12 yrn edge of table 10\"x12 yrn edge of table 10\"x12 yrn edge of table 10\"x12 yrn edge of table           Ther Ex        Nustep/treadmill Speed 2 10' on treadmill  " "Speed 2-2.6 10' on treadmill  Speed 2-2.6 10' on treadmill  Speed 2 10' on treadmill  Speed 2 10' on treadmill    Hip add 5\"x20 5\"x20 5\"x20  5\"x20 5\"x20   Clamshells  Blue hooklying 3x10 yrn Blue sidelying 3x10 yrn Blue sidelying 3x10 yrn Blue hooklying 3x10  Blue hooklying 3x10    Hip marching in hooklying    Add NV     Pball squats  2x10 with cues  3x10 with cues  4x10 ball squats   2x10 with cues    Ab brace with 90-90 hold         Paloff press Dbl green x20 yrn Dbl green x20 yrn Dbl green x20 yrn  Dbl green x20 yrn                   Pt education        Ther Activity                        Gait Training                        Modalities        MHP                                   "

## 2025-05-09 ENCOUNTER — OFFICE VISIT (OUTPATIENT)
Dept: PHYSICAL THERAPY | Facility: CLINIC | Age: 70
End: 2025-05-09
Payer: MEDICARE

## 2025-05-09 DIAGNOSIS — M47.816 LUMBAR SPONDYLOSIS: Primary | ICD-10-CM

## 2025-05-09 DIAGNOSIS — M48.062 SPINAL STENOSIS OF LUMBAR REGION WITH NEUROGENIC CLAUDICATION: ICD-10-CM

## 2025-05-09 DIAGNOSIS — M46.1 SACROILIITIS (HCC): ICD-10-CM

## 2025-05-09 PROCEDURE — 97110 THERAPEUTIC EXERCISES: CPT

## 2025-05-09 PROCEDURE — 97112 NEUROMUSCULAR REEDUCATION: CPT

## 2025-05-09 NOTE — PROGRESS NOTES
"Daily Note     Today's date: 2025  Patient name: Sam Peacock  : 1955  MRN: [id number]  Referring provider: Ceci Osborn CRNP  Dx:   Encounter Diagnosis     ICD-10-CM    1. Lumbar spondylosis  M47.816       2. Spinal stenosis of lumbar region with neurogenic claudication  M48.062       3. Sacroiliitis (HCC)  M46.1                      Subjective: Pt reports that she is feeling good still in regards to her back.      Objective: See treatment diary below      Assessment: Tolerated treatment well. Pt tolerated all exercises well. Added heel tap to progress core strengthening. Pt reported to feel good post session. Patient would benefit from continued PT      Plan: Progress treatment as tolerated.       Precautions Hx: DM, HTN, kidney CA (nephrectomy)  HEP: spinal stenosis, spondylosis, [id number]        Manuals 25                                   Neuro Re-Ed         LTR 10\"x10 yrn 10\"x10 ryn 10\"x10 yrn 10\"x10 yrn 10\"x10 yrn   PPT 2x10 5\" X15 5\" X15 5\" X15 5\" 2x10 3\"   DKTC 12x10\" DKTC 12x10\" DKTC 12x10\" DKTC 12x10\" DKTC 12x10\" DKTC   Ball stretch  10x10\" fwd, and each side 10x10\" fwd, and each side 10x10\" fwd, and each side 10x10\" fwd, and each side 10x10\" fwd, and each side   Pball iso with ab brace 5\"x20 5\"x20 with slight crunch lifting head  5\"x20 with slight crunch lifting head  5\"x20 with slight crunch lifting head  X15-20 3-5\" press in to pball with ab iso   Supine hip flexor stretch on table  With strap 10\"x12 yrn With strap 10\"x12 yrn With strap 10\"x12 yrn With strap 10\"x12 yrn With strap 10\"x12 yrn   Hamstring stretch  10\"x12 yrn edge of table 10\"x12 yrn edge of table 10\"x12 yrn edge of table 10\"x12 yrn edge of table 10\"x12 yrn edge of table           Ther Ex        Nustep/treadmill Speed 2 10' on treadmill  Speed 2-2.6 10' on treadmill  Speed 2-2.6 10' on treadmill  Speed 2-2.5 10' on treadmill  Speed 2 10' on treadmill    Hip add 5\"x20 5\"x20 5\"x20  5\"x20 " "yellow MB 5\"x20   Clamshells  Blue hooklying 3x10 yrn Blue sidelying 3x10 yrn Blue sidelying 3x10 yrn Blue sidelying 3x10 yrn Blue hooklying 3x10    Hip marching in hooklying    Add NV 3x5 on each leg  Hooklying heel tap alt pattern     Pball squats  2x10 with cues  3x10 with cues  4x10 ball squats  4x10 ball squats  2x10 with cues    Ab brace with 90-90 hold         Paloff press Dbl green x20 yrn Dbl green x20 yrn Dbl green x20 yrn Dbl green x20 yrn Dbl green x20 yrn                   Pt education        Ther Activity                        Gait Training                        Modalities        MHP                                     "

## 2025-05-12 ENCOUNTER — EVALUATION (OUTPATIENT)
Dept: PHYSICAL THERAPY | Facility: CLINIC | Age: 70
End: 2025-05-12
Payer: MEDICARE

## 2025-05-12 DIAGNOSIS — M47.816 LUMBAR SPONDYLOSIS: Primary | ICD-10-CM

## 2025-05-12 DIAGNOSIS — M48.062 SPINAL STENOSIS OF LUMBAR REGION WITH NEUROGENIC CLAUDICATION: ICD-10-CM

## 2025-05-12 DIAGNOSIS — M46.1 SACROILIITIS (HCC): ICD-10-CM

## 2025-05-12 PROCEDURE — 97112 NEUROMUSCULAR REEDUCATION: CPT

## 2025-05-12 PROCEDURE — 97110 THERAPEUTIC EXERCISES: CPT

## 2025-05-12 NOTE — PROGRESS NOTES
PT Re-Evaluation     Today's date: 2025  Patient name: Sam Peacock  : 1955  MRN: [id number]  Referring provider: Ceci Osborn CRNP  Dx:   Encounter Diagnosis     ICD-10-CM    1. Lumbar spondylosis  M47.816       2. Spinal stenosis of lumbar region with neurogenic claudication  M48.062       3. Sacroiliitis (HCC)  M46.1                      Assessment  Impairments: abnormal gait, abnormal or restricted ROM, abnormal movement, impaired balance, impaired physical strength, pain with function, poor posture , poor body mechanics, participation limitations and endurance  Symptom irritability: moderate    Assessment details: Patient is a 69 year old female presenting to this facility with complaints of low back pain and a hx of spondylosis and stenosis. Recently been experiencing neurogenic claudication. Educated patient on anatomy and pathology of these. Upon evaluation, her signs and symptoms align with referring dx. Patient does like to walk and remain active. She is recently retired and has been limited by the back pain, numbness, and pain into her LE and feet. Upon evaluation, patient presented with decreased lumbar ROM, abdominal strength, postural deficits, and slight deficits in hip strength. Abnormalities noted in gait. Slight leg length discrepancy in supine that changes with long sitting position. Pt does have paraesthesia in ankles and feet. Educated patient on exercise/walking modifications when she experiences the neurogenic claudication such as fwd lean or seated rest and the continuing walk. Recommended frequent activity and positional changes as able. Patient was issued patient education and issued HEP. She would benefit from physical therapy to address deficits noted above in order to decrease pain, improve mobility, and maximize independence with ADLs and leisure activities.       PT Re-Evaluation 25  Patient is a 69 year old female being treated at this facility with  complaints of low back pain and a hx of spondylosis and stenosis with associated sx of neurogenic claudication. Patient has been consistently attending PT and reporting continued improvements in pain and ambulation distance. She reports having good and bad days. She was able to walk 3 laps at the park the other day without stopping, but then the next time she walked, she had to stop and rest a few times. Bending forward still reduces symptoms for her. She remains most limited by L hip pain and numbness into the legs. Pt is deciding if she is going to get her L hip replaced or not given her back complications.  Since starting PT, she has had a L hip injection and then an epidural into her low back. Both of these seemed to help her symptoms but not take her symptoms away. She feels that she made about 40% improvement since starting PT. She would like to get about 20 % more improvement from where she is now, she knows that she wont get to 100%. [facility] to the [employer] and she really wants to be able to do this without having her legs go numb. Upon re-evaluation, patient demonstrated improvements in lumbar ROM, hip strength, and reports improved endurance with long distance walking. Pt continues to demonstrate improved abdominal strength with progressions in PT. Pt did have numbness throughout session that remained constant. She would continue to benefit from additional PT to continue addressing remaining deficits in lumbar ROM, strength, and core stability in order to maximize functional mobility and independence with ADLs and recreational activities.   Understanding of Dx/Px/POC: good     Prognosis: good    Goals  ST.  Decrease pain 50% 6 wk  2.  Increase trunk ROM to minimal-moderate limitations  6 wk  3.  Increase trunk strength to good- 6 wk  4.  Increase BLE strength to 4+/5 6 wk (50% met)  LT.  Pt will report no pain 12 wk  2.  Increase trunk ROM to WNL 12 wk  3.  Increase  trunk strength to  12 wk  4.  Pt will report no limitations with ADL's 12 wk  5.  Pt will report no limitations with ambulation 12 wk (20% met)      Plan  Patient would benefit from: PT eval and skilled physical therapy  Planned modality interventions: cryotherapy and thermotherapy: hydrocollator packs    Planned therapy interventions: ADL retraining, balance/weight bearing training, body mechanics training, flexibility, functional ROM exercises, graded exercise, home exercise program, manual therapy, neuromuscular re-education, patient/caregiver education, postural training, self care, strengthening, stretching, therapeutic activities and therapeutic exercise    Frequency: 1-2x week  Duration in weeks: 12  Treatment plan discussed with: patient  Plan details: Continue with skilled PT at this time to address remaining deficits         Subjective Evaluation    History of Present Illness  Mechanism of injury: Patient is a 69 year old female presenting to this facility with chief complaint of bilateral lower back pain with radiation into the bilateral lower extremities. She does have a hx of chronic pain and was referred by pain management. Her script was initially for aqua therapy, however, pt prefers land based due to location. She reports numbness and weakness in the legs as well as heaviness with about 1 mile of ambulation. Relieved with sitting. This has been going on for about 6 months. She also notes pain down both of her legs and in the L leg she gets pain in the anterior portion of her thigh. Sitting will relieve the pain but if she sits a lot then it makes it worse. She reports that bending over to stretch her back and touch her toes does make it feel better. She seems to do this so often. She notes just retiring and she likes to do stuff. She is active with her boyfriend, they like to stay busy with activities and she has been limited with this pain.   She denies bowel or bladder incontinence. She does  "report that she has numbness in saddle region, described as area that would touch a bicycle seat. This seems to be all day but it will ease up at night when sleeping. She does note degeneration in her lumbar spine. She reports history of fibromyalgia. Also has history of diabetes, high cholesterol, hypertension, CKD. She reports using marijuana daily.   Tries to walk at her local park and after about one mile she has to sit due to the numbness in her feet. Prolonged sitting is notably painful. She reports numbness down the back of the legs and into the feet that increases with walking along with heaviness. Prior to this numbness, she denies any numbness to the bottom of her feet. Other than walking, she likes to garden, swim (4x a week with nice weather) and she will start this . Swimming is her favorite activity. Bending over and leaning fwd, rubbing her back does help. Heat and muscle relaxer seem to help, she takes this at night. She has been taking these for a month. No other regular exercise other than the walk.  low back injection and  hip injection.        Patient Goals  Patient goals for therapy: decreased pain, improved balance, increased motion, increased strength, independence with ADLs/IADLs and return to sport/leisure activities  Patient goal: \"keep up with walking, gardening, and other hobbies\"  Pain  Current pain ratin  At best pain ratin (in bed while on heated waterbed)  At worst pain ratin  Location: middle of low back and down into both legs in the back and LLE groin to knee  Quality: radiating, sharp and discomfort  Relieving factors: heat (stretches)  Aggravating factors: walking and sitting  Progression: worsening    Social Support  Steps to enter house: yes  12  Stairs in house: yes   17      Diagnostic Tests  MRI studies: abnormal  Treatments  Previous treatment: injection treatment (injection for the hip)  Current treatment: medication        Objective     Static " Posture   General Observations  Tilted left.     Lumbar Spine   Decreased lordosis.     Neurological Testing     Additional Neurological Details  Light touch intact throughout LE bilaterally    Once I reached bilateral ankles, pt reports impaired sensations, paraesthesias     Active Range of Motion     Lumbar   Flexion:  WFL  Extension:  Restriction level: minimal  Left lateral flexion:  Restriction level: minimal  Right lateral flexion:  Restriction level: minimal  Left rotation:  with pain Restriction level: moderate  Right rotation:  Restriction level: moderate    Strength/Myotome Testing     Left Hip   Planes of Motion   Flexion: 4+  Extension: 4+  Abduction: 4+  Adduction: 4+    Right Hip   Planes of Motion   Flexion: 4+  Extension: 4+  Abduction: 4+  Adduction: 4+    Left Knee   Flexion: 5  Extension: 5    Right Knee   Flexion: 5  Extension: 5    Left Ankle/Foot   Dorsiflexion: 5  Plantar flexion: 5    Right Ankle/Foot   Dorsiflexion: 5  Plantar flexion: 5    Tests     Lumbar   Positive sacroiliac distraction .     Right Pelvic Girdle/Sacrum Right active SLR test positive: pain in R side low back.    Left Hip   Positive long sit.     Additional Tests Details  Increased tightness in R quad with prone knee bend test as compared to L  Pain with L prone knee bend in L side low back     Ambulation     Comments   L trunk lean with ambulation   Decreased trunk rotation and arm swing     Pt notes to feel wobbly with walking     General Comments:      Lumbar Comments  DKTC- improvement in symptoms   + shopping cart positional response  Pain in L hip with DKTC on release             Improvement in symptoms with LTR      LLE slightly longer in supine that improved with long sitting position     Hip Comments   Increased tightness in L hip with passive flx and IR.     Increased IR tightness R hip, however worse on L hip       Increased tightness posterior chain bilateral LE              Precautions Hx: DM, HTN, kidney CA  "(nephrectomy)  HEP: spinal stenosis, spondylosis, [id number]        Manuals 4-30-25 5-2-25 5-7-25 5-9-25 5-12-25                                   Neuro Re-Ed         LTR 10\"x10 yrn 10\"x10 yrn 10\"x10 yrn 10\"x10 yrn 10\"x10 yrn   PPT 2x10 5\" X15 5\" X15 5\" X15 5\" X15 5\"   DKTC 12x10\" DKTC 12x10\" DKTC 12x10\" DKTC 12x10\" DKTC 12x10\" DKTC   Ball stretch  10x10\" fwd, and each side 10x10\" fwd, and each side 10x10\" fwd, and each side 10x10\" fwd, and each side 10x10\" fwd, and each side   Pball iso with ab brace 5\"x20 5\"x20 with slight crunch lifting head  5\"x20 with slight crunch lifting head  5\"x20 with slight crunch lifting head  5\"x20 with slight crunch lifting head    Supine hip flexor stretch on table  With strap 10\"x12 yrn With strap 10\"x12 yrn With strap 10\"x12 yrn With strap 10\"x12 yrn With strap 10\"x12 yrn   Hamstring stretch  10\"x12 yrn edge of table 10\"x12 yrn edge of table 10\"x12 yrn edge of table 10\"x12 yrn edge of table 10\"x12 yrn edge of table           Ther Ex        Nustep/treadmill Speed 2 10' on treadmill  Speed 2-2.6 10' on treadmill  Speed 2-2.6 10' on treadmill  Speed 2-2.5 10' on treadmill  Speed 2-2.5 10' on treadmill    Hip add 5\"x20 5\"x20 5\"x20  5\"x20 yellow MB 5\"x20 yellow MB   Clamshells  Blue hooklying 3x10 yrn Blue sidelying 3x10 yrn Blue sidelying 3x10 yrn Blue sidelying 3x10 yrn Blue sidelying 3x10 yrn   Hip marching in hooklying    Add NV 3x5 on each leg  Hooklying heel tap alt pattern  2x10    Pball squats  2x10 with cues  3x10 with cues  4x10 ball squats  4x10 ball squats  4x10 ball squats    Ab brace with 90-90 hold         Paloff press Dbl green x20 yrn Dbl green x20 yrn Dbl green x20 yrn Dbl green x20 yrn    Glute bridges      2x10 3\"           Pt education        Ther Activity                        Gait Training                        Modalities        MHP                   "

## 2025-05-14 ENCOUNTER — OFFICE VISIT (OUTPATIENT)
Dept: PHYSICAL THERAPY | Facility: CLINIC | Age: 70
End: 2025-05-14
Payer: MEDICARE

## 2025-05-14 DIAGNOSIS — M46.1 SACROILIITIS (HCC): ICD-10-CM

## 2025-05-14 DIAGNOSIS — M47.816 LUMBAR SPONDYLOSIS: Primary | ICD-10-CM

## 2025-05-14 DIAGNOSIS — M48.062 SPINAL STENOSIS OF LUMBAR REGION WITH NEUROGENIC CLAUDICATION: ICD-10-CM

## 2025-05-14 PROCEDURE — 97110 THERAPEUTIC EXERCISES: CPT

## 2025-05-14 PROCEDURE — 97112 NEUROMUSCULAR REEDUCATION: CPT

## 2025-05-14 NOTE — PROGRESS NOTES
"Daily Note     Today's date: 2025  Patient name: Sam Peacock  : 1955  MRN: [id number]  Referring provider: Ceci Osborn CRNP  Dx:   Encounter Diagnosis     ICD-10-CM    1. Lumbar spondylosis  M47.816       2. Spinal stenosis of lumbar region with neurogenic claudication  M48.062       3. Sacroiliitis (HCC)  M46.1           Start Time: 0947          Subjective: Pt reports that she is doing well today, not really getting too much numbness into the legs. She felt good after last session despite progressions to program.       Objective: See treatment diary below      Assessment: Tolerated treatment well. Pt did well with exercises today. Able to progress glute bridges over roll to full bridges. Decreased reps for DKTC due to pain in L hip. Added small weight to pball squats. After 3x10, patient reported to have minimal dizziness. Took short rest break with drink of water and this eased up but did not go away. Finished session with ball stretch and held paloff press. Pt reported to feel okay after ball stretch, dizziness continued to lessen. Progress NV as able. Patient would benefit from continued PT      Plan: Progress treatment as tolerated.       Precautions Hx: DM, HTN, kidney CA (nephrectomy)  HEP: spinal stenosis, spondylosis, [id number]        Manuals 25                                   Neuro Re-Ed         LTR 10\"x10 yrn 10\"x10 yrn 10\"x10 yrn 10\"x10 yrn 10\"x10 yrn   PPT X15 5\" X15 5\" X15 5\" X15 5\" X15 5\"   DKTC 7x10\" then stopped due to L hip pain 12x10\" DKTC 12x10\" DKTC 12x10\" DKTC 12x10\" DKTC   Ball stretch  10x10\" fwd, and each side 10x10\" fwd, and each side 10x10\" fwd, and each side 10x10\" fwd, and each side 10x10\" fwd, and each side   Pball iso with ab brace 5\"x20 with slight crunch lifting head  5\"x20 with slight crunch lifting head  5\"x20 with slight crunch lifting head  5\"x20 with slight crunch lifting head  5\"x20 with slight crunch lifting head  " "  Supine hip flexor stretch on table  With strap 10\"x12 yrn With strap 10\"x12 yrn With strap 10\"x12 yrn With strap 10\"x12 yrn With strap 10\"x12 yrn   Hamstring stretch  10\"x12 yrn edge of table 10\"x12 yrn edge of table 10\"x12 yrn edge of table 10\"x12 yrn edge of table 10\"x12 yrn edge of table           Ther Ex        Nustep/treadmill Speed 2-2.5 10' on treadmill  Speed 2-2.6 10' on treadmill  Speed 2-2.6 10' on treadmill  Speed 2-2.5 10' on treadmill  Speed 2-2.5 10' on treadmill    Hip add Yellow MB 5\"x20 5\"x20 5\"x20  5\"x20 yellow MB 5\"x20 yellow MB   Clamshells  Blue sidelying 3x10  Blue sidelying 3x10 yrn Blue sidelying 3x10 yrn Blue sidelying 3x10 yrn Blue sidelying 3x10 yrn   Hip marching in hooklying  2x10 yrn  Add NV 3x5 on each leg  Hooklying heel tap alt pattern  2x10    Pball squats  7# DB pball squats 3x10  3x10 with cues  4x10 ball squats  4x10 ball squats  4x10 ball squats    Ab brace with 90-90 hold  90-90 UE iso pushing into thighs 10\" 2x5       Paloff press Hold due to dizziness  Dbl green x20 yrn Dbl green x20 yrn Dbl green x20 yrn    Glute bridges  Regular bridges 2x10. Start with PPT then lift     2x10 3\"           Pt education        Ther Activity                        Gait Training                        Modalities        MHP                      "

## 2025-05-16 ENCOUNTER — OFFICE VISIT (OUTPATIENT)
Dept: PAIN MEDICINE | Facility: CLINIC | Age: 70
End: 2025-05-16
Payer: COMMERCIAL

## 2025-05-16 VITALS — BODY MASS INDEX: 24.98 KG/M2 | WEIGHT: 141 LBS | HEIGHT: 63 IN

## 2025-05-16 DIAGNOSIS — M54.41 CHRONIC BILATERAL LOW BACK PAIN WITH BILATERAL SCIATICA: ICD-10-CM

## 2025-05-16 DIAGNOSIS — M48.062 SPINAL STENOSIS OF LUMBAR REGION WITH NEUROGENIC CLAUDICATION: Primary | ICD-10-CM

## 2025-05-16 DIAGNOSIS — G89.29 CHRONIC BILATERAL LOW BACK PAIN WITH BILATERAL SCIATICA: ICD-10-CM

## 2025-05-16 DIAGNOSIS — M16.12 PRIMARY OSTEOARTHRITIS OF ONE HIP, LEFT: ICD-10-CM

## 2025-05-16 DIAGNOSIS — M54.42 CHRONIC BILATERAL LOW BACK PAIN WITH BILATERAL SCIATICA: ICD-10-CM

## 2025-05-16 PROCEDURE — 99213 OFFICE O/P EST LOW 20 MIN: CPT

## 2025-05-16 NOTE — PROGRESS NOTES
Pain Medicine Follow-Up Note    Assessment:  1. Spinal stenosis of lumbar region with neurogenic claudication    2. Primary osteoarthritis of one hip, left    3. Chronic bilateral low back pain with bilateral sciatica        Plan:    My impressions and treatment recommendations were discussed in detail with the patient who verbalized understanding and had no further questions.      Patient returns to the office stating that overall her pain symptoms are better and currently has a pain score of 2 out of 10 on the verbal numeric pain scale.  On 4/22/2025 patient had a lumbar epidural steroid injection at L4-L5 which the patient reports approximately 50% reduction of her pain symptoms currently which is ongoing.  Patient concerned with the left lateral hip pain, patient has had a left intra-articular hip injection, expressed to the patient that we could do the left greater trochanteric bursa injection if it was more concerning just for over the trochanteric area.  Educated the patient on left intra-articular hip injections on where the medication goes patient felt that it would only help in the groin and not in the side of the back.  Also reviewed patient's MRI showing the severity of her spinal stenosis.  Patient is still very active and currently participating with physical therapy.  Advised the patient that if her pain were to become more moderate and persistent we can offer a repeat epidural injection.  She also continues to use tizanidine 4 mg daily at nighttime to help with muscle cramping which she does find very helpful at this time she does not need a refill.    Follow-up is planned in 6 months time or sooner as warranted.  Discharge instructions were provided. I personally saw and examined the patient and I agree with the above discussed plan of care.    History of Present Illness:    Sam Peacock is a 69 y.o. female who presents to [facility] for interval re-evaluation of the  above stated pain complaints. The patient has a past medical and chronic pain history as outlined in the assessment section. She was last seen on 4/22/2025.    At today's visit patient states that their pain symptoms are better with a pain score of 2/10 on the verbal numeric pain scale.  The patient's pain is worse in the evening.  The patient's pain is evening in nature.  And the quality of the patient's pain is described as burning and numbness.  The patient's pain is located in the bilateral low back and posterior bilateral legs.     Other than as stated above, the patient denies any interval changes in medications, medical condition, mental condition, symptoms, or allergies since the last office visit.         Review of Systems:    Review of Systems   Respiratory:  Negative for shortness of breath.    Cardiovascular:  Negative for chest pain.   Gastrointestinal:  Negative for constipation, diarrhea, nausea and vomiting.   Musculoskeletal:  Positive for arthralgias and back pain. Negative for gait problem, joint swelling and myalgias.   Skin:  Negative for rash.   Neurological:  Positive for numbness. Negative for dizziness, seizures and weakness.   All other systems reviewed and are negative.        Past Medical History:   Diagnosis Date   • Arthritis    • Cancer (HCC) 03/30/2016    Rt Kidney Removed   • Chronic kidney disease    • Diabetes mellitus (HCC)    • Diabetes type 2, controlled (HCC)    • Fallopian tube disorder     fills with fluid   • Hypertension    • Kidney cysts    • Multiple thyroid nodules    • Osteoarthritis    • Stage 3 chronic kidney disease (HCC)        Past Surgical History:   Procedure Laterality Date   • KIDNEY SURGERY Right     removal   • TUBAL LIGATION Right    • WRIST SURGERY Bilateral     basal joint soft tissue interpositional arthroplast L thumb 04/22 , 01/17/2024       Family History   Problem Relation Age of Onset   • Heart disease Mother    • Hypertension Mother    •  "Hypertension Father    • Diabetes Maternal Grandfather    • Lung cancer Brother    • Hypertension Brother    • BRCA2 Positive Cousin    • BRCA1 Positive Cousin    • Breast cancer Cousin         maternal       Social History     Occupational History   • Not on file   Tobacco Use   • Smoking status: Never     Passive exposure: Never   • Smokeless tobacco: Never   Vaping Use   • Vaping status: Never Used   Substance and Sexual Activity   • Alcohol use: Never   • Drug use: Yes     Types: Marijuana   • Sexual activity: Yes     Partners: Male     Birth control/protection: Post-menopausal       Current Medications[1]    Allergies[2]    Physical Exam:    Ht 5' 3\" (1.6 m)   Wt 64 kg (141 lb)   BMI 24.98 kg/m²     Constitutional:normal, well developed, well nourished, alert, in no distress and non-toxic and no overt pain behavior.  Eyes:anicteric  HEENT:grossly intact  Neck:supple, symmetric, trachea midline and no masses   Pulmonary:even and unlabored  Cardiovascular:No edema or pitting edema present  Skin:Normal without rashes or lesions and well hydrated  Psychiatric:Mood and affect appropriate  Neurologic:Cranial Nerves II-XII grossly intact  Musculoskeletal:normal gait      This document was created using speech voice recognition software.   Grammatical errors, random word insertions, pronoun errors, and incomplete sentences are an occasional consequence of this system due to software limitations, ambient noise, and hardware issues.   Any formal questions or concerns about content, text, or information contained within the body of this dictation should be directly addressed to the provider for clarification.           [1]    Current Outpatient Medications:   •  allopurinol (ZYLOPRIM) 300 mg tablet, take 1 tablet by mouth once daily, Disp: 100 tablet, Rfl: 1  •  aspirin 81 mg chewable tablet, Chew 81 mg in the morning, Disp: , Rfl:   •  atenolol (TENORMIN) 50 mg tablet, take 1 tablet by mouth every morning, Disp: 90 " tablet, Rfl: 1  •  atorvastatin (LIPITOR) 10 mg tablet, Take 1 tablet (10 mg total) by mouth every morning, Disp: 100 tablet, Rfl: 1  •  Cetirizine-Pseudoephedrine (ZYRTEC-D ALLERGY & CONGESTION PO), as needed, Disp: , Rfl:   •  cholecalciferol (VITAMIN D3) 25 mcg (1,000 units) tablet, 2,000 Units in the morning., Disp: , Rfl:   •  DULoxetine (CYMBALTA) 60 mg delayed release capsule, take 1 capsule by mouth once daily, Disp: 30 capsule, Rfl: 5  •  fluticasone (FLONASE) 50 mcg/act nasal spray, 2 sprays into each nostril daily, Disp: 51 mL, Rfl: 3  •  Insulin Pen Needle (Droplet Pen Needles) 32G X 4 MM MISC, USE 3 TIMES A DAY AS DIRECTED, Disp: 100 each, Rfl: 5  •  Lancets (OneTouch Delica Plus [id number]) MISC, use 1 LANCET to TEST BLOOD SUGAR once daily, Disp: 100 each, Rfl: 1  •  liraglutide (VICTOZA) injection, inject 0.6 milligram subcutaneously daily, Disp: 6 mL, Rfl: 5  •  Omega-3 Fatty Acids (fish oil) 1,000 mg, , Disp: , Rfl:   •  OneTouch Ultra test strip, TEST 3 TIMES A DAY AS DIRECTED, Disp: 100 strip, Rfl: 1  •  tiZANidine (ZANAFLEX) 4 mg tablet, Take 1 tablet (4 mg total) by mouth daily at bedtime as needed for muscle spasms, Disp: 90 tablet, Rfl: 0  •  Calcium Carbonate-Vit D-Min (CALCIUM 1200 PO), Take 1,200 mg by mouth daily (Patient not taking: Reported on 2/4/2025), Disp: , Rfl: [2]  Allergies  Allergen Reactions   • Nsaids GI Intolerance     Chronic Kidney disease-right total nephrectomy   • Pollen Extract Sneezing

## 2025-05-16 NOTE — PATIENT INSTRUCTIONS
Core Strengthening Exercises   WHAT YOU NEED TO KNOW:   What do I need to know about core strengthening exercises?  Core strengthening exercises help heal and strengthen muscles of your hips, back, and abdomen to prevent reinjury. They are beginning exercises to help support your spine. Ask your healthcare provider if you need to see a physical therapist for more advanced exercises.  Do the exercises on a mat or firm surface  (not on a bed) to support your spine and avoid low back pain.     Do the exercises in the same order every time  to train your muscles to work together. Your healthcare provider will show you how to perform these exercises. Do them every day, or as directed by your healthcare provider.     Move slowly and smoothly.  Avoid fast or jerky motions.     Stop if you feel pain.  It is normal to feel some discomfort at first. Regular exercise will help decrease your discomfort over time.  How do I perform core strengthening exercises safely?  Hold each exercise for 5 seconds. When you can do the exercise without pain for 5 seconds, increase your hold to 10 to 15 seconds. When you can do the exercise without pain for 10 to 15 seconds, add the next exercise. Increase the time you hold each exercise, or repeat the exercises as directed. As you do each exercise, breathe normally. Do not hold your breath.  Abdominal bracing:  Lie on your back with your knees bent and feet flat on the floor. Place your arms in a relaxed position beside your body. Pull your belly button in toward your spine. Do not flatten or arch your back. Tighten the abdominal muscles below your belly button. Hold for 5 seconds. Begin all of your exercises with abdominal bracing. You can also practice abdominal bracing throughout the day while you are sitting or standing.           Bridging:  Lie on your back with your knees bent and feet flat on the floor. Rest your arms at your side. Tighten your buttocks, and then lift your hips 1 inch  off the floor. Hold for 5 seconds. When you can do this exercise without pain for 10 seconds, increase the distance you lift your hips. A good goal is to be able to lift your hips so that your shoulders, hips, and knees are in a straight line.           Curl up:  Lie on your back with your knees bent and feet flat on the floor. Place your hands, palms down, underneath the curve in your lower back. Next, with your elbows on the floor, lift your shoulders and chest 2 to 3 inches. Keep your head in line with your shoulders. Hold this position for 5 seconds. When you can do this exercise without pain for 10 to 15 seconds, you may add a rotation. While your shoulders and chest are lifted off the ground, turn slightly to the left and hold. Repeat on the other side.           Dead bug:  Lie on your back with your knees bent and feet flat on the floor. Place your arms in a relaxed position beside your body. Begin with abdominal bracing. Next, raise one leg, keeping your knee bent. Hold for 5 seconds. Repeat with the other leg. When you can do this exercise without pain for 10 to 15 seconds, you may raise one straight leg and hold. Repeat with the other leg.           Quadruped:  Place your hands and knees on the floor. Keep your wrists directly below your shoulders and your knees directly below your hips. Pull your belly button in toward your spine. Do not flatten or arch your back. Tighten your abdominal muscles below your belly button. Hold for 5 seconds. When you can do this exercise without pain for 10 to 15 seconds, you may extend one arm and hold. Repeat on the other side.           Side Bridge:      Standing side bridge:  Stand next to a wall and extend one arm toward the wall. Place your palm flat on the wall with your fingers pointing upward. Begin with abdominal bracing. Next, without moving your feet, slowly bend your arm to 90 degrees. Hold for 5 seconds. Repeat on the other side. When you can do this exercise  without pain for 10 to 15 seconds, you may do the bent leg side bridge on the floor.           Bent leg side bridge:  Lie on one side with your legs, hips, and shoulders in a straight line. Prop yourself up onto your forearm so your elbow is directly below your shoulder. Bend your knees back to 90 degrees. Begin with abdominal bracing. Next, lift your hips and balance yourself on your forearm and knees. Hold for 5 seconds. Repeat on the other side. When you can do this exercise without pain for 10 to 15 seconds, you may do the straight leg side bridge on the floor.           Straight leg side bridge:  Lie on one side with your legs, hips, and shoulders in a straight line. Prop yourself up onto your forearm so your elbow is directly below your shoulder. Begin with abdominal bracing. Lift your hips off the floor and balance yourself on your forearm and the outside of your flexed foot. Do not let your ankle bend sideways. Hold for 5 seconds. Repeat on the other side. When you can do this exercise without pain for 10 to 15 seconds, ask your healthcare provider for more advanced exercises.       When should I contact my healthcare provider?   Your pain becomes worse.    You have new pain.    You have questions or concerns about your condition, care, or exercise program.  CARE AGREEMENT:   You have the right to help plan your care. Learn about your health condition and how it may be treated. Discuss treatment options with your caregivers to decide what care you want to receive. You always have the right to refuse treatment. The above information is an  only. It is not intended as medical advice for individual conditions or treatments. Talk to your doctor, nurse or pharmacist before following any medical regimen to see if it is safe and effective for you.  © 2016 [employer]. Information is for End User's use only and may not be sold, redistributed or otherwise used for commercial purposes.  All illustrations and images included in CareNotes® are the copyrighted property of [employer] or [employer].     Tizanidine (By mouth)   Tizanidine (cdu-QLM-m-liam)  Treats muscle spasticity.   Brand Name(s): Zanaflex, Zanaflex Capsule   There may be other brand names for this medicine.  When This Medicine Should Not Be Used:   This medicine is not right for everyone. Do not use if you had an allergic reaction to tizanidine.  How to Use This Medicine:   Capsule, Tablet  Take your medicine as directed. Your dose may need to be changed several times to find what works best for you.  You may take this medicine with or without food, but always take it the same way every time. Tizanidine works differently depending on whether you take it on an empty stomach or a full stomach. Talk to your doctor if you have any questions about this.  Missed dose: Take a dose as soon as you remember. If it is almost time for your next dose, wait until then and take a regular dose. Do not take extra medicine to make up for a missed dose.  Store the medicine in a closed container at room temperature, away from heat, moisture, and direct light.  Drugs and Foods to Avoid:   Ask your doctor or pharmacist before using any other medicine, including over-the-counter medicines, vitamins, and herbal products.  Do not use this medicine together with ciprofloxacin or fluvoxamine.  Some foods and medicines can affect how tizanidine works. Tell your doctor if you are using any of the following:  Acyclovir, baclofen, cimetidine, famotidine, ticlopidine, verapamil, zileuton  Birth control pills, blood pressure medicine, medicine for heart rhythm problems (such as amiodarone, mexiletine, propafenone), or medicine to treat an infection (such as levofloxacin, moxifloxacin)  Do not drink alcohol while you are using this medicine.  Tell your doctor if you use anything else that makes you sleepy. Some examples are allergy medicine, narcotic  pain medicine, and alcohol.  Warnings While Using This Medicine:   Tell your doctor if you are pregnant or breastfeeding, or if you have kidney disease or liver disease.  This medicine may cause the following problems:  Low blood pressure  Liver damage  This medicine may make you dizzy or drowsy. Do not drive or do anything else that could be dangerous until you know how this medicine affects you. Stand or sit up slowly if you are dizzy.  Do not stop using this medicine suddenly. Your doctor will need to slowly decrease your dose before you stop it completely.  Your doctor will do lab tests at regular visits to check on the effects of this medicine. Keep all appointments.  Keep all medicine out of the reach of children. Never share your medicine with anyone.  Possible Side Effects While Using This Medicine:   Call your doctor right away if you notice any of these side effects:  Allergic reaction: Itching or hives, swelling in your face or hands, swelling or tingling in your mouth or throat, chest tightness, trouble breathing  Dark urine or pale stools, nausea, vomiting, loss of appetite, stomach pain, yellow skin or eyes  Lightheadedness, dizziness, or fainting  Seeing or hearing things that are not really there  Slow heartbeat  If you notice these less serious side effects, talk with your doctor:   Dry mouth  Drowsiness or sleepiness  Weakness  If you notice other side effects that you think are caused by this medicine, tell your doctor.   Call your doctor for medical advice about side effects. You may report side effects to FDA at 5-505-FDA-7666    © Copyright [employer] 2022 Information is for End User's use only and may not be sold, redistributed or otherwise used for commercial purposes.  The above information is an  only. It is not intended as medical advice for individual conditions or treatments. Talk to your doctor, nurse or pharmacist before following any medical regimen to see if it is  safe and effective for you.

## 2025-05-19 ENCOUNTER — OFFICE VISIT (OUTPATIENT)
Dept: PHYSICAL THERAPY | Facility: CLINIC | Age: 70
End: 2025-05-19
Payer: MEDICARE

## 2025-05-19 DIAGNOSIS — M47.816 LUMBAR SPONDYLOSIS: Primary | ICD-10-CM

## 2025-05-19 DIAGNOSIS — M48.062 SPINAL STENOSIS OF LUMBAR REGION WITH NEUROGENIC CLAUDICATION: ICD-10-CM

## 2025-05-19 DIAGNOSIS — M46.1 SACROILIITIS (HCC): ICD-10-CM

## 2025-05-19 PROCEDURE — 97110 THERAPEUTIC EXERCISES: CPT

## 2025-05-19 PROCEDURE — 97112 NEUROMUSCULAR REEDUCATION: CPT

## 2025-05-19 NOTE — PROGRESS NOTES
"Daily Note     Today's date: 2025  Patient name: Sam Peacock  : 1955  MRN: [id number]  Referring provider: Ceci Osborn CRNP  Dx:   Encounter Diagnosis     ICD-10-CM    1. Lumbar spondylosis  M47.816       2. Spinal stenosis of lumbar region with neurogenic claudication  M48.062       3. Sacroiliitis (HCC)  M46.1                      Subjective: Pt reports that she fell 2 times over the weekend but other than that doing well. The one time she went to sit on a chair and the one leg sunk down and she slid off. The other time she was stepping over something and she hit her foot on a step and tripped. She has some soreness in the right hip and side area but other than that good. Her back was pretty good over the weekend.      Objective: See treatment diary below      Assessment: Tolerated treatment well. No c/o pain/discomfort throughout session. Pt demonstrating improved mechanics with abdominal strengthening exercises. Minimal cues provided to ensure proper technique. Pt reported to feel good post session.  Patient would benefit from continued PT      Plan: Progress treatment as tolerated.       Precautions Hx: DM, HTN, kidney CA (nephrectomy)  HEP: spinal stenosis, spondylosis, [id number]        Manuals 25                                   Neuro Re-Ed         LTR 10\"x10 yrn 10\"x10 yrn 10\"x10 yrn 10\"x10 yrn 10\"x10 yrn   PPT X15 5\" X15 5\" X15 5\" X15 5\" X15 5\"   DKTC 7x10\" then stopped due to L hip pain  12x10\" DKTC 12x10\" DKTC 12x10\" DKTC   Ball stretch  10x10\" fwd, and each side 10x10\" fwd, and each side 10x10\" fwd, and each side 10x10\" fwd, and each side 10x10\" fwd, and each side   Pball iso with ab brace 5\"x20 with slight crunch lifting head  5\"x20 with slight crunch lifting head  5\"x20 with slight crunch lifting head  5\"x20 with slight crunch lifting head  5\"x20 with slight crunch lifting head    Supine hip flexor stretch on table  With strap 10\"x12 yrn With " "strap 10\"x12 yrn With strap 10\"x12 yrn With strap 10\"x12 yrn With strap 10\"x12 yrn   Hamstring stretch  10\"x12 yrn edge of table 10\"x12 yrn edge of table 10\"x12 yrn edge of table 10\"x12 yrn edge of table 10\"x12 yrn edge of table           Ther Ex        Nustep/treadmill Speed 2-2.5 10' on treadmill  Speed 2-2.5 10' on treadmill  Speed 2-2.6 10' on treadmill  Speed 2-2.5 10' on treadmill  Speed 2-2.5 10' on treadmill    Hip add Yellow MB 5\"x20 Yellow MB 5\"x20 5\"x20  5\"x20 yellow MB 5\"x20 yellow MB   Clamshells  Blue sidelying 3x10  Blue sidelying 3x10  Blue sidelying 3x10 yrn Blue sidelying 3x10 yrn Blue sidelying 3x10 yrn   Hip marching in hooklying  2x10 yrn 2x10 yrn Add NV 3x5 on each leg  Hooklying heel tap alt pattern  2x10    Pball squats  7# DB pball squats 3x10  No weight 4x10  4x10 ball squats  4x10 ball squats  4x10 ball squats    Ab brace with 90-90 hold  90-90 UE iso pushing into thighs 10\" 2x5 90-90 UE iso pushing into thighs 10\" 2x6      Paloff press Hold due to dizziness  Dbl blue x20 each way  Dbl green x20 yrn Dbl green x20 yrn    Glute bridges  Regular bridges 2x10. Start with PPT then lift  Regular bridges 2x10. Start with PPT then lift    2x10 3\"           Pt education        Ther Activity                        Gait Training                        Modalities        MHP                        "

## 2025-05-21 ENCOUNTER — APPOINTMENT (OUTPATIENT)
Dept: LAB | Facility: CLINIC | Age: 70
End: 2025-05-21
Payer: COMMERCIAL

## 2025-05-21 ENCOUNTER — OFFICE VISIT (OUTPATIENT)
Dept: PHYSICAL THERAPY | Facility: CLINIC | Age: 70
End: 2025-05-21
Payer: MEDICARE

## 2025-05-21 DIAGNOSIS — M47.816 LUMBAR SPONDYLOSIS: Primary | ICD-10-CM

## 2025-05-21 DIAGNOSIS — M46.1 SACROILIITIS (HCC): ICD-10-CM

## 2025-05-21 DIAGNOSIS — E79.0 HYPERURICEMIA: ICD-10-CM

## 2025-05-21 DIAGNOSIS — E55.9 VITAMIN D DEFICIENCY: ICD-10-CM

## 2025-05-21 DIAGNOSIS — E11.22 TYPE 2 DIABETES MELLITUS WITH STAGE 3B CHRONIC KIDNEY DISEASE, WITHOUT LONG-TERM CURRENT USE OF INSULIN (HCC): ICD-10-CM

## 2025-05-21 DIAGNOSIS — N18.32 TYPE 2 DIABETES MELLITUS WITH STAGE 3B CHRONIC KIDNEY DISEASE, WITHOUT LONG-TERM CURRENT USE OF INSULIN (HCC): ICD-10-CM

## 2025-05-21 DIAGNOSIS — E78.5 HYPERLIPIDEMIA, UNSPECIFIED HYPERLIPIDEMIA TYPE: ICD-10-CM

## 2025-05-21 DIAGNOSIS — M48.062 SPINAL STENOSIS OF LUMBAR REGION WITH NEUROGENIC CLAUDICATION: ICD-10-CM

## 2025-05-21 LAB
25(OH)D3 SERPL-MCNC: 58.2 NG/ML (ref 30–100)
CHOLEST SERPL-MCNC: 183 MG/DL (ref ?–200)
CREAT UR-MCNC: 157.9 MG/DL
ERYTHROCYTE [DISTWIDTH] IN BLOOD BY AUTOMATED COUNT: 17.2 % (ref 11.6–15.1)
EST. AVERAGE GLUCOSE BLD GHB EST-MCNC: 123 MG/DL
HBA1C MFR BLD: 5.9 %
HCT VFR BLD AUTO: 43.8 % (ref 34.8–46.1)
HDLC SERPL-MCNC: 58 MG/DL
HGB BLD-MCNC: 13.2 G/DL (ref 11.5–15.4)
LDLC SERPL CALC-MCNC: 110 MG/DL (ref 0–100)
MCH RBC QN AUTO: 29.6 PG (ref 26.8–34.3)
MCHC RBC AUTO-ENTMCNC: 30.1 G/DL (ref 31.4–37.4)
MCV RBC AUTO: 98 FL (ref 82–98)
MICROALBUMIN UR-MCNC: 72.4 MG/L
MICROALBUMIN/CREAT 24H UR: 46 MG/G CREATININE (ref 0–30)
PLATELET # BLD AUTO: 351 THOUSANDS/UL (ref 149–390)
PMV BLD AUTO: 12.6 FL (ref 8.9–12.7)
RBC # BLD AUTO: 4.46 MILLION/UL (ref 3.81–5.12)
TRIGL SERPL-MCNC: 77 MG/DL (ref ?–150)
URATE SERPL-MCNC: 3.5 MG/DL (ref 2–7.5)
WBC # BLD AUTO: 11.14 THOUSAND/UL (ref 4.31–10.16)

## 2025-05-21 PROCEDURE — 84550 ASSAY OF BLOOD/URIC ACID: CPT

## 2025-05-21 PROCEDURE — 80061 LIPID PANEL: CPT

## 2025-05-21 PROCEDURE — 82043 UR ALBUMIN QUANTITATIVE: CPT

## 2025-05-21 PROCEDURE — 82306 VITAMIN D 25 HYDROXY: CPT

## 2025-05-21 PROCEDURE — 97112 NEUROMUSCULAR REEDUCATION: CPT

## 2025-05-21 PROCEDURE — 82570 ASSAY OF URINE CREATININE: CPT

## 2025-05-21 PROCEDURE — 85027 COMPLETE CBC AUTOMATED: CPT

## 2025-05-21 PROCEDURE — 36415 COLL VENOUS BLD VENIPUNCTURE: CPT

## 2025-05-21 PROCEDURE — 97110 THERAPEUTIC EXERCISES: CPT

## 2025-05-21 PROCEDURE — 83036 HEMOGLOBIN GLYCOSYLATED A1C: CPT

## 2025-05-21 NOTE — PROGRESS NOTES
"Daily Note     Today's date: 2025  Patient name: Sam Peacock  : 1955  MRN: [id number]  Referring provider: Ceci Osborn CRNP  Dx:   Encounter Diagnosis     ICD-10-CM    1. Lumbar spondylosis  M47.816       2. Spinal stenosis of lumbar region with neurogenic claudication  M48.062       3. Sacroiliitis (HCC)  M46.1                      Subjective: Pt reports that her back is feeling good today.      Objective: See treatment diary below      Assessment: Tolerated treatment well.Pt tolerated all exercises well. Challenged with core exercises needing increased breaks due to fatigue. Minimal cues provided for proper abdominal activation and hold with pball iso and paloff press. Pt reported her back felt goo at the end of the session but her L hip was hurting.  Patient would benefit from continued PT      Plan: Progress treatment as tolerated.       Precautions Hx: DM, HTN, kidney CA (nephrectomy)  HEP: spinal stenosis, spondylosis, [id number]        Manuals 25                                   Neuro Re-Ed         LTR 10\"x10 yrn 10\"x10 yrn 10\"x10 yrn 10\"x10 yrn 10\"x10 yrn   PPT X15 5\" X15 5\" X15 5\" X15 5\" X15 5\"   DKTC 7x10\" then stopped due to L hip pain   12x10\" DKTC 12x10\" DKTC   Ball stretch  10x10\" fwd, and each side 10x10\" fwd, and each side 10x10\" fwd, and each side 10x10\" fwd, and each side 10x10\" fwd, and each side   Pball iso with ab brace 5\"x20 with slight crunch lifting head  5\"x20 with slight crunch lifting head  5\"x20 with slight crunch lifting head  5\"x20 with slight crunch lifting head  5\"x20 with slight crunch lifting head    Supine hip flexor stretch on table  With strap 10\"x12 yrn With strap 10\"x12 yrn With strap 10\"x12 yrn With strap 10\"x12 yrn With strap 10\"x12 yrn   Hamstring stretch  10\"x12 yrn edge of table 10\"x12 yrn edge of table 10\"x12 yrn edge of table 10\"x12 yrn edge of table 10\"x12 yrn edge of table           Ther Ex      " "  Nustep/treadmill Speed 2-2.5 10' on treadmill  Speed 2-2.5 10' on treadmill  Speed 2-2.5 10' on treadmill  Speed 2-2.5 10' on treadmill  Speed 2-2.5 10' on treadmill    Hip add Yellow MB 5\"x20 Yellow MB 5\"x20 Yellow MB 5\"x20 5\"x20 yellow MB 5\"x20 yellow MB   Clamshells  Blue sidelying 3x10  Blue sidelying 3x10  Blue sidelying 3x10  Blue sidelying 3x10 yrn Blue sidelying 3x10 yrn   Hip marching in hooklying  2x10 yrn 2x10 yrn 2x10 yrn 3x5 on each leg  Hooklying heel tap alt pattern  2x10    Pball squats  7# DB pball squats 3x10  No weight 4x10  No weight 4x10 4x10 ball squats  4x10 ball squats    Ab brace with 90-90 hold  90-90 UE iso pushing into thighs 10\" 2x5 90-90 UE iso pushing into thighs 10\" 2x6 90-90 UE iso pushing into thighs 10\" x8  X6  x2     Paloff press Hold due to dizziness  Dbl blue x20 each way  Dbl blue x20 each way  Dbl green x20 yrn    Glute bridges  Regular bridges 2x10. Start with PPT then lift  Regular bridges 2x10. Start with PPT then lift  Regular bridges 2x10. Start with PPT then lift   2x10 3\"           Pt education        Ther Activity                        Gait Training                        Modalities        MHP                          "

## 2025-05-27 ENCOUNTER — OFFICE VISIT (OUTPATIENT)
Dept: FAMILY MEDICINE CLINIC | Facility: CLINIC | Age: 70
End: 2025-05-27
Payer: MEDICARE

## 2025-05-27 ENCOUNTER — OFFICE VISIT (OUTPATIENT)
Dept: PHYSICAL THERAPY | Facility: CLINIC | Age: 70
End: 2025-05-27
Payer: MEDICARE

## 2025-05-27 VITALS
HEIGHT: 63 IN | TEMPERATURE: 97 F | WEIGHT: 139 LBS | OXYGEN SATURATION: 96 % | BODY MASS INDEX: 24.63 KG/M2 | DIASTOLIC BLOOD PRESSURE: 76 MMHG | SYSTOLIC BLOOD PRESSURE: 122 MMHG | HEART RATE: 65 BPM

## 2025-05-27 DIAGNOSIS — Z00.00 MEDICARE ANNUAL WELLNESS VISIT, SUBSEQUENT: ICD-10-CM

## 2025-05-27 DIAGNOSIS — J30.9 ALLERGIC RHINITIS, UNSPECIFIED SEASONALITY, UNSPECIFIED TRIGGER: ICD-10-CM

## 2025-05-27 DIAGNOSIS — M48.062 SPINAL STENOSIS OF LUMBAR REGION WITH NEUROGENIC CLAUDICATION: ICD-10-CM

## 2025-05-27 DIAGNOSIS — N18.32 TYPE 2 DIABETES MELLITUS WITH STAGE 3B CHRONIC KIDNEY DISEASE, WITHOUT LONG-TERM CURRENT USE OF INSULIN (HCC): Primary | ICD-10-CM

## 2025-05-27 DIAGNOSIS — M47.816 LUMBAR SPONDYLOSIS: Primary | ICD-10-CM

## 2025-05-27 DIAGNOSIS — E79.0 HYPERURICEMIA: ICD-10-CM

## 2025-05-27 DIAGNOSIS — E11.22 TYPE 2 DIABETES MELLITUS WITH STAGE 3B CHRONIC KIDNEY DISEASE, WITHOUT LONG-TERM CURRENT USE OF INSULIN (HCC): Primary | ICD-10-CM

## 2025-05-27 DIAGNOSIS — M46.1 SACROILIITIS (HCC): ICD-10-CM

## 2025-05-27 DIAGNOSIS — F33.9 DEPRESSION, RECURRENT (HCC): ICD-10-CM

## 2025-05-27 DIAGNOSIS — E78.5 HYPERLIPIDEMIA, UNSPECIFIED HYPERLIPIDEMIA TYPE: ICD-10-CM

## 2025-05-27 DIAGNOSIS — Z12.31 ENCOUNTER FOR SCREENING MAMMOGRAM FOR BREAST CANCER: ICD-10-CM

## 2025-05-27 DIAGNOSIS — I10 PRIMARY HYPERTENSION: ICD-10-CM

## 2025-05-27 DIAGNOSIS — Z78.0 POST-MENOPAUSE: ICD-10-CM

## 2025-05-27 DIAGNOSIS — M54.41 CHRONIC BILATERAL LOW BACK PAIN WITH BILATERAL SCIATICA: ICD-10-CM

## 2025-05-27 DIAGNOSIS — N18.31 STAGE 3A CHRONIC KIDNEY DISEASE (HCC): ICD-10-CM

## 2025-05-27 DIAGNOSIS — E55.9 VITAMIN D DEFICIENCY: ICD-10-CM

## 2025-05-27 DIAGNOSIS — G89.29 CHRONIC BILATERAL LOW BACK PAIN WITH BILATERAL SCIATICA: ICD-10-CM

## 2025-05-27 DIAGNOSIS — Z90.5 HX OF UNILATERAL NEPHRECTOMY: ICD-10-CM

## 2025-05-27 DIAGNOSIS — M54.42 CHRONIC BILATERAL LOW BACK PAIN WITH BILATERAL SCIATICA: ICD-10-CM

## 2025-05-27 PROBLEM — Z72.0 TOBACCO USE: Status: RESOLVED | Noted: 2022-12-01 | Resolved: 2025-05-27

## 2025-05-27 LAB
LEFT EYE DIABETIC RETINOPATHY: NORMAL
LEFT EYE IMAGE QUALITY: NORMAL
LEFT EYE MACULAR EDEMA: NORMAL
LEFT EYE OTHER RETINOPATHY: NORMAL
RIGHT EYE DIABETIC RETINOPATHY: NORMAL
RIGHT EYE IMAGE QUALITY: NORMAL
RIGHT EYE MACULAR EDEMA: NORMAL
RIGHT EYE OTHER RETINOPATHY: NORMAL
SEVERITY (EYE EXAM): NORMAL

## 2025-05-27 PROCEDURE — 97112 NEUROMUSCULAR REEDUCATION: CPT

## 2025-05-27 PROCEDURE — 92250 FUNDUS PHOTOGRAPHY W/I&R: CPT | Performed by: FAMILY MEDICINE

## 2025-05-27 PROCEDURE — G0402 INITIAL PREVENTIVE EXAM: HCPCS | Performed by: FAMILY MEDICINE

## 2025-05-27 PROCEDURE — 97110 THERAPEUTIC EXERCISES: CPT

## 2025-05-27 PROCEDURE — 99214 OFFICE O/P EST MOD 30 MIN: CPT | Performed by: FAMILY MEDICINE

## 2025-05-27 PROCEDURE — G2211 COMPLEX E/M VISIT ADD ON: HCPCS | Performed by: FAMILY MEDICINE

## 2025-05-27 NOTE — ASSESSMENT & PLAN NOTE
Lab Results   Component Value Date    EGFR 48 01/22/2025    EGFR 48 08/22/2024    EGFR 48 07/19/2024    CREATININE 1.15 01/22/2025    CREATININE 1.15 08/22/2024    CREATININE 1.15 07/19/2024       Orders:    UA (URINE) with reflex to Scope; Future    Phosphorus; Future    Phosphorus; Future    Protein / creatinine ratio, urine; Future

## 2025-05-27 NOTE — PATIENT INSTRUCTIONS
Medicare Preventive Visit Patient Instructions  Thank you for completing your Welcome to Medicare Visit or Medicare Annual Wellness Visit today. Your next wellness visit will be due in one year (5/28/2026).  The screening/preventive services that you may require over the next 5-10 years are detailed below. Some tests may not apply to you based off risk factors and/or age. Screening tests ordered at today's visit but not completed yet may show as past due. Also, please note that scanned in results may not display below.  Preventive Screenings:  Service Recommendations Previous Testing/Comments   Colorectal Cancer Screening  * Colonoscopy    * Fecal Occult Blood Test (FOBT)/Fecal Immunochemical Test (FIT)  * Fecal DNA/Cologuard Test  * Flexible Sigmoidoscopy Age: 45-75 years old   Colonoscopy: every 10 years (may be performed more frequently if at higher risk)  OR  FOBT/FIT: every 1 year  OR  Cologuard: every 3 years  OR  Sigmoidoscopy: every 5 years  Screening may be recommended earlier than age 45 if at higher risk for colorectal cancer. Also, an individualized decision between you and your healthcare provider will decide whether screening between the ages of 76-85 would be appropriate. Colonoscopy: Not on file  FOBT/FIT: Not on file  Cologuard: 08/02/2023  Sigmoidoscopy: Not on file    Screening Current     Breast Cancer Screening Age: 40+ years old  Frequency: every 1-2 years  Not required if history of left and right mastectomy Mammogram: 05/16/2024    Screening Current   Cervical Cancer Screening Between the ages of 21-29, pap smear recommended once every 3 years.   Between the ages of 30-65, can perform pap smear with HPV co-testing every 5 years.   Recommendations may differ for women with a history of total hysterectomy, cervical cancer, or abnormal pap smears in past. Pap Smear: 02/14/2023    Screening Not Indicated   Hepatitis C Screening Once for adults born between 1945 and 1965  More frequently in  patients at high risk for Hepatitis C Hep C Antibody: 12/09/2022    Screening Current   Diabetes Screening 1-2 times per year if you're at risk for diabetes or have pre-diabetes Fasting glucose: 104 mg/dL (1/22/2025)  A1C: 5.9 % (5/21/2025)  Screening Not Indicated  History Diabetes   Cholesterol Screening Once every 5 years if you don't have a lipid disorder. May order more often based on risk factors. Lipid panel: 05/21/2025    Screening Not Indicated  History Lipid Disorder     Other Preventive Screenings Covered by Medicare:  Abdominal Aortic Aneurysm (AAA) Screening: covered once if your at risk. You're considered to be at risk if you have a family history of AAA.  Lung Cancer Screening: covers low dose CT scan once per year if you meet all of the following conditions: (1) Age 55-77; (2) No signs or symptoms of lung cancer; (3) Current smoker or have quit smoking within the last 15 years; (4) You have a tobacco smoking history of at least 20 pack years (packs per day multiplied by number of years you smoked); (5) You get a written order from a healthcare provider.  Glaucoma Screening: covered annually if you're considered high risk: (1) You have diabetes OR (2) Family history of glaucoma OR (3)  aged 50 and older OR (4)  American aged 65 and older  Osteoporosis Screening: covered every 2 years if you meet one of the following conditions: (1) You're estrogen deficient and at risk for osteoporosis based off medical history and other findings; (2) Have a vertebral abnormality; (3) On glucocorticoid therapy for more than 3 months; (4) Have primary hyperparathyroidism; (5) On osteoporosis medications and need to assess response to drug therapy.   Last bone density test (DXA Scan): 03/09/2023.  HIV Screening: covered annually if you're between the age of 15-65. Also covered annually if you are younger than 15 and older than 65 with risk factors for HIV infection. For pregnant patients, it is  covered up to 3 times per pregnancy.    Immunizations:  Immunization Recommendations   Influenza Vaccine Annual influenza vaccination during flu season is recommended for all persons aged >= 6 months who do not have contraindications   Pneumococcal Vaccine   * Pneumococcal conjugate vaccine = PCV13 (Prevnar 13), PCV15 (Vaxneuvance), PCV20 (Prevnar 20)  * Pneumococcal polysaccharide vaccine = PPSV23 (Pneumovax) Adults 19-63 yo with certain risk factors or if 65+ yo  If never received any pneumonia vaccine: recommend Prevnar 20 (PCV20)  Give PCV20 if previously received 1 dose of PCV13 or PPSV23   Hepatitis B Vaccine 3 dose series if at intermediate or high risk (ex: diabetes, end stage renal disease, liver disease)   Respiratory syncytial virus (RSV) Vaccine - COVERED BY MEDICARE PART D  * RSVPreF3 (Arexvy) CDC recommends that adults 60 years of age and older may receive a single dose of RSV vaccine using shared clinical decision-making (SCDM)   Tetanus (Td) Vaccine - COST NOT COVERED BY MEDICARE PART B Following completion of primary series, a booster dose should be given every 10 years to maintain immunity against tetanus. Td may also be given as tetanus wound prophylaxis.   Tdap Vaccine - COST NOT COVERED BY MEDICARE PART B Recommended at least once for all adults. For pregnant patients, recommended with each pregnancy.   Shingles Vaccine (Shingrix) - COST NOT COVERED BY MEDICARE PART B  2 shot series recommended in those 19 years and older who have or will have weakened immune systems or those 50 years and older     Health Maintenance Due:      Topic Date Due   • Breast Cancer Screening: Mammogram  05/16/2025   • Colorectal Cancer Screening  08/02/2026   • Hepatitis C Screening  Completed     Immunizations Due:      Topic Date Due   • Pneumococcal Vaccine: 65+ Years (1 of 2 - PCV) Never done     Advance Directives   What are advance directives?  Advance directives are legal documents that state your wishes and  plans for medical care. These plans are made ahead of time in case you lose your ability to make decisions for yourself. Advance directives can apply to any medical decision, such as the treatments you want, and if you want to donate organs.   What are the types of advance directives?  There are many types of advance directives, and each state has rules about how to use them. You may choose a combination of any of the following:  Living will:  This is a written record of the treatment you want. You can also choose which treatments you do not want, which to limit, and which to stop at a certain time. This includes surgery, medicine, IV fluid, and tube feedings.   Durable power of  for healthcare (DPAHC):  This is a written record that states who you want to make healthcare choices for you when you are unable to make them for yourself. This person, called a proxy, is usually a family member or a friend. You may choose more than 1 proxy.  Do not resuscitate (DNR) order:  A DNR order is used in case your heart stops beating or you stop breathing. It is a request not to have certain forms of treatment, such as CPR. A DNR order may be included in other types of advance directives.  Medical directive:  This covers the care that you want if you are in a coma, near death, or unable to make decisions for yourself. You can list the treatments you want for each condition. Treatment may include pain medicine, surgery, blood transfusions, dialysis, IV or tube feedings, and a ventilator (breathing machine).  Values history:  This document has questions about your views, beliefs, and how you feel and think about life. This information can help others choose the care that you would choose.  Why are advance directives important?  An advance directive helps you control your care. Although spoken wishes may be used, it is better to have your wishes written down. Spoken wishes can be misunderstood, or not followed. Treatments  may be given even if you do not want them. An advance directive may make it easier for your family to make difficult choices about your care.   Fall Prevention    Fall prevention  includes ways to make your home and other areas safer. It also includes ways you can move more carefully to prevent a fall. Health conditions that cause changes in your blood pressure, vision, or muscle strength and coordination may increase your risk for falls. Medicines may also increase your risk for falls if they make you dizzy, weak, or sleepy.   Fall prevention tips:   Stand or sit up slowly.    Use assistive devices as directed.    Wear shoes that fit well and have soles that .    Wear a personal alarm.    Stay active.    Manage your medical conditions.    Home Safety Tips:  Add items to prevent falls in the bathroom.    Keep paths clear.    Install bright lights in your home.    Keep items you use often on shelves within reach.    Paint or place reflective tape on the edges of your stairs.     © Copyright [employer] 2018 Information is for End User's use only and may not be sold, redistributed or otherwise used for commercial purposes. All illustrations and images included in CareNotes® are the copyrighted property of [employer]., Inc. or [employer]

## 2025-05-27 NOTE — PROGRESS NOTES
Name: Sam Peacock      : 1955      MRN: [id number]  Encounter Provider: Liang Milligan MD  Encounter Date: 2025   Encounter department: [place] [id number] [address]  :  Assessment & Plan  Type 2 diabetes mellitus with stage 3b chronic kidney disease, without long-term current use of insulin (HCC)  Continue Victoza 0.6 mg daily  Lab Results   Component Value Date    HGBA1C 5.9 (H) 2025       Orders:    IRIS Diabetic eye exam    Hemoglobin A1C; Future    Albumin / creatinine urine ratio; Future    Medicare annual wellness visit, subsequent  Return visit in 4 months with fasting blood prior to visit       Primary hypertension  Continue atenolol 50 mg daily       Stage 3a chronic kidney disease (HCC)  Lab Results   Component Value Date    EGFR 48 2025    EGFR 48 2024    EGFR 48 2024    CREATININE 1.15 2025    CREATININE 1.15 2024    CREATININE 1.15 2024       Orders:    UA (URINE) with reflex to Scope; Future    Phosphorus; Future    Phosphorus; Future    Protein / creatinine ratio, urine; Future    Depression, recurrent (HCC)  Continue Cymbalta 60 mg daily         Hyperlipidemia, unspecified hyperlipidemia type  Continue Lipitor 10 mg daily  Orders:    Comprehensive metabolic panel; Future    CBC and differential; Future    Lipid Panel with Direct LDL reflex; Future    Vitamin D deficiency  Vitamin D3 1000 units daily  Orders:    Vitamin D 25 hydroxy; Future    Hyperuricemia  Continue allopurinol 300 mg daily  Orders:    Uric acid; Future    Hx of unilateral nephrectomy         Chronic bilateral low back pain with bilateral sciatica         Allergic rhinitis, unspecified seasonality, unspecified trigger         Post-menopause    Orders:    DXA bone density spine hip and pelvis; Future    Encounter for screening mammogram for breast cancer    Orders:    Mammo screening bilateral w 3d and cad; Future       Preventive health issues  were discussed with patient, and age appropriate screening tests were ordered as noted in patient's After Visit Summary. Personalized health advice and appropriate referrals for health education or preventive services given if needed, as noted in patient's After Visit Summary.    History of Present Illness     HPI   Patient Care Team:  Liang Milligan MD as PCP - General (Family Medicine)  Catalina Banks MD (Nephrology)    Review of Systems  Medical History Reviewed by provider this encounter:       Annual Wellness Visit Questionnaire   Sam is here for her Subsequent Wellness visit. Last Medicare Wellness visit information reviewed, patient interviewed, no change since last AWV.     Health Risk Assessment:   Patient rates overall health as good. Patient feels that their physical health rating is slightly better. Patient is satisfied with their life. Eyesight was rated as same. Hearing was rated as same. Patient feels that their emotional and mental health rating is same. Patients states they are never, rarely angry. Patient states they are often unusually tired/fatigued. Pain experienced in the last 7 days has been some. Patient's pain rating has been 3/10. Patient states that she has experienced no weight loss or gain in last 6 months.     Depression Screening:   PHQ-9 Score: 0      Fall Risk Screening:   In the past year, patient has experienced: history of falling in past year    Number of falls: 2 or more  Injured during fall?: No    Feels unsteady when standing or walking?: No    Worried about falling?: No      Home Safety:  Patient has trouble with stairs inside or outside of their home. Patient has working smoke alarms and has working carbon monoxide detector. Home safety hazards include: none.     Nutrition:   Current diet is Regular.     Medications:   Patient is currently taking over-the-counter supplements. OTC medications include: see medication list. Patient is able to manage medications.     Activities  of Daily Living (ADLs)/Instrumental Activities of Daily Living (IADLs):   Walk and transfer into and out of bed and chair?: Yes  Dress and groom yourself?: Yes    Bathe or shower yourself?: Yes    Feed yourself? Yes  Do your laundry/housekeeping?: Yes  Manage your money, pay your bills and track your expenses?: Yes  Make your own meals?: Yes    Do your own shopping?: Yes    Previous Hospitalizations:   Any hospitalizations or ED visits within the last 12 months?: No      Advance Care Planning:   Living will: Yes    Durable POA for healthcare: Yes    Advanced directive: Yes    Advanced directive counseling given: Yes    Five wishes given: No    End of Life Decisions reviewed with patient: Yes    Provider agrees with end of life decisions: Yes      Preventive Screenings      Cardiovascular Screening:    General: Screening Not Indicated, History Lipid Disorder and Screening Current      Diabetes Screening:     General: Screening Not Indicated, History Diabetes and Screening Current      Colorectal Cancer Screening:     General: Screening Current      Breast Cancer Screening:     General: Screening Current      Cervical Cancer Screening:    General: Screening Not Indicated      Osteoporosis Screening:    General: Risks and Benefits Discussed    Due for: DXA Axial      Abdominal Aortic Aneurysm (AAA) Screening:        General: Screening Not Indicated      Lung Cancer Screening:     General: Screening Not Indicated      Hepatitis C Screening:    General: Screening Current    Immunizations:  - Immunizations due: Prevnar 20 and Zoster (Shingrix)    Screening, Brief Intervention, and Referral to Treatment (SBIRT)     Screening  Typical number of drinks in a day: 0  Typical number of drinks in a week: 0  Interpretation: Low risk drinking behavior.    Single Item Drug Screening:  How often have you used an illegal drug (including marijuana) or a prescription medication for non-medical reasons in the past year? never    Single  "Item Drug Screen Score: 0  Interpretation: Negative screen for possible drug use disorder    Social Drivers of Health     Financial Resource Strain: Low Risk  (4/3/2023)    Overall Financial Resource Strain (CARDIA)     Difficulty of Paying Living Expenses: Not hard at all   Food Insecurity: No Food Insecurity (5/27/2025)    Nursing - Inadequate Food Risk Classification     Worried About Running Out of Food in the Last Year: Never true     Ran Out of Food in the Last Year: Never true   Transportation Needs: No Transportation Needs (5/27/2025)    PRAPARE - Transportation     Lack of Transportation (Medical): No     Lack of Transportation (Non-Medical): No   Housing Stability: Low Risk  (5/27/2025)    Housing Stability Vital Sign     Unable to Pay for Housing in the Last Year: No     Number of Times Moved in the Last Year: 0     Homeless in the Last Year: No   Utilities: Not At Risk (5/27/2025)    [facility] Utilities     Threatened with loss of utilities: No     No results found.    Objective   /76   Pulse 65   Temp (!) 97 °F (36.1 °C)   Ht 5' 3\" (1.6 m)   Wt 63 kg (139 lb)   SpO2 96%   BMI 24.62 kg/m²     Physical Exam    "

## 2025-05-27 NOTE — ASSESSMENT & PLAN NOTE
Continue Lipitor 10 mg daily  Orders:    Comprehensive metabolic panel; Future    CBC and differential; Future    Lipid Panel with Direct LDL reflex; Future

## 2025-05-27 NOTE — PROGRESS NOTES
"Daily Note     Today's date: 2025  Patient name: Sam Peacock  : 1955  MRN: [id number]  Referring provider: Ceci Osborn CRNP  Dx:   Encounter Diagnosis     ICD-10-CM    1. Lumbar spondylosis  M47.816       2. Spinal stenosis of lumbar region with neurogenic claudication  M48.062       3. Sacroiliitis (HCC)  M46.1                      Subjective: Erma reports walking over the weekend and doing well. She still has good and bad days in reference to yrn tingling in LE.       Objective: See treatment diary below      Assessment: Visual and VC to ensure correct exercise technique. Fatigue with ab brace with 90-90 hold. Denied any increases in pain or increases in neurogenic claudication symptoms with exercises performed. Pt would continue to benefit from skilled PT. Progress as able.       Plan: Continue with current POC to address pt deficits.      Precautions Hx: DM, HTN, kidney CA (nephrectomy)  HEP: spinal stenosis, spondylosis, [id number]        Manuals 25                                   Neuro Re-Ed         LTR 10\"x10 yrn 10\"x10 yrn 10\"x10 yrn 10\"x10 yrn  10\"x10 yrn   PPT X15 5\" X15 5\" X15 5\" X15 5\"  X15 5\"   DKTC 7x10\" then stopped due to L hip pain    12x10\" DKTC   Ball stretch  10x10\" fwd, and each side 10x10\" fwd, and each side 10x10\" fwd, and each side 10x10\" fwd, and each side  10x10\" fwd, and each side   Pball iso with ab brace 5\"x20 with slight crunch lifting head  5\"x20 with slight crunch lifting head  5\"x20 with slight crunch lifting head  5\"x20 with slight crunch lifting head  5\"x20 with slight crunch lifting head    Supine hip flexor stretch on table  With strap 10\"x12 yrn With strap 10\"x12 yrn With strap 10\"x12 yrn With strap 10\"x12 yrn  With strap 10\"x12 yrn   Hamstring stretch  10\"x12 yrn edge of table 10\"x12 yrn edge of table 10\"x12 yrn edge of table 10\"x12 yrn edge of table 10\"x12 yrn edge of table           Ther Ex        Nustep/treadmill " "Speed 2-2.5 10' on treadmill  Speed 2-2.5 10' on treadmill  Speed 2-2.5 10' on treadmill  Speed 2-2.5 10' on treadmill  Speed 2-2.5 10' on treadmill    Hip add Yellow MB 5\"x20 Yellow MB 5\"x20 Yellow MB 5\"x20 Yellow MB 5\" x20 5\"x20 yellow MB   Clamshells  Blue sidelying 3x10  Blue sidelying 3x10  Blue sidelying 3x10  Blue SL 3x10 yrn  Blue sidelying 3x10 yrn   Hip marching in hooklying  2x10 yrn 2x10 yrn 2x10 yrn 2x10 yrn  2x10    Pball squats  7# DB pball squats 3x10  No weight 4x10  No weight 4x10 No weight 4x10 4x10 ball squats    Ab brace with 90-90 hold  90-90 UE iso pushing into thighs 10\" 2x5 90-90 UE iso pushing into thighs 10\" 2x6 90-90 UE iso pushing into thighs 10\" x8  X6  x2 90-90 UE iso pushing into thighs    Paloff press Hold due to dizziness  Dbl blue x20 each way  Dbl blue x20 each way  Dbl blue x20 yrn     Glute bridges  Regular bridges 2x10. Start with PPT then lift  Regular bridges 2x10. Start with PPT then lift  Regular bridges 2x10. Start with PPT then lift  Regular bridges 2x10. Start with PPT then lift  2x10 3\"           Pt education        Ther Activity                        Gait Training                        Modalities        MHP                            "

## 2025-05-27 NOTE — ASSESSMENT & PLAN NOTE
Continue Victoza 0.6 mg daily  Lab Results   Component Value Date    HGBA1C 5.9 (H) 05/21/2025       Orders:    IRIS Diabetic eye exam    Hemoglobin A1C; Future    Albumin / creatinine urine ratio; Future

## 2025-05-28 ENCOUNTER — OFFICE VISIT (OUTPATIENT)
Dept: PHYSICAL THERAPY | Facility: CLINIC | Age: 70
End: 2025-05-28
Payer: MEDICARE

## 2025-05-28 DIAGNOSIS — M47.816 LUMBAR SPONDYLOSIS: Primary | ICD-10-CM

## 2025-05-28 DIAGNOSIS — M46.1 SACROILIITIS (HCC): ICD-10-CM

## 2025-05-28 DIAGNOSIS — M48.062 SPINAL STENOSIS OF LUMBAR REGION WITH NEUROGENIC CLAUDICATION: ICD-10-CM

## 2025-05-28 PROCEDURE — 97110 THERAPEUTIC EXERCISES: CPT

## 2025-05-28 PROCEDURE — 97112 NEUROMUSCULAR REEDUCATION: CPT

## 2025-05-28 NOTE — PROGRESS NOTES
"Daily Note     Today's date: 2025  Patient name: Sam Peacock  : 1955  MRN: [id number]  Referring provider: Ceci Osborn CRNP  Dx:   Encounter Diagnosis     ICD-10-CM    1. Lumbar spondylosis  M47.816       2. Spinal stenosis of lumbar region with neurogenic claudication  M48.062       3. Sacroiliitis (HCC)  M46.1                      Subjective: Erma reports doing fine in therapy session yesterday but then did experience an increase in LBP last night which has since subsided today.       Objective: See treatment diary below      Assessment: Visual and VC to ensure correct exercise technique. L hip bothersome with hamstring stretches but pt was able to tolerate to complete. Pt reports enjoying pball squats and denies any discomfort in LB or L hip and feels relief. Denied any increases in pain with exercises performed. Pt would continue to benefit from skilled PT.       Plan: Continue with current POC to address pt deficits.      Precautions Hx: DM, HTN, kidney CA (nephrectomy)  HEP: spinal stenosis, spondylosis, [id number]        Manuals 25                                   Neuro Re-Ed         LTR 10\"x10 yrn 10\"x10 yrn 10\"x10 yrn 10\"x10 yrn  10\"x10 yrn    PPT X15 5\" X15 5\" X15 5\" X15 5\"  X15  5\"    DKTC 7x10\" then stopped due to L hip pain       Ball stretch  10x10\" fwd, and each side 10x10\" fwd, and each side 10x10\" fwd, and each side 10x10\" fwd, and each side  10x10\" fwd, and each side    Pball iso with ab brace 5\"x20 with slight crunch lifting head  5\"x20 with slight crunch lifting head  5\"x20 with slight crunch lifting head  5\"x20 with slight crunch lifting head  5\"x20 with slight crunch lifting head    Supine hip flexor stretch on table  With strap 10\"x12 yrn With strap 10\"x12 yrn With strap 10\"x12 yrn With strap 10\"x12 yrn  With strap 10\"x12 yrn    Hamstring stretch  10\"x12 yrn edge of table 10\"x12 yrn edge of table 10\"x12 yrn edge of table 10\"x12 yrn " "edge of table 10\"x12 yrn edge of table            Ther Ex        Nustep/treadmill Speed 2-2.5 10' on treadmill  Speed 2-2.5 10' on treadmill  Speed 2-2.5 10' on treadmill  Speed 2-2.5 10' on treadmill  Speed 2-2.5 10' on treadmill    Hip add Yellow MB 5\"x20 Yellow MB 5\"x20 Yellow MB 5\"x20 Yellow MB 5\" x20 Yellow MB 5\" x20   Clamshells  Blue sidelying 3x10  Blue sidelying 3x10  Blue sidelying 3x10  Blue SL 3x10 yrn  Blue SL 3x10 yrn    Hip marching in hooklying  2x10 yrn 2x10 yrn 2x10 yrn 2x10 yrn  2x10 yrn    Pball squats  7# DB pball squats 3x10  No weight 4x10  No weight 4x10 No weight 4x10 No weight 4x10   Ab brace with 90-90 hold  90-90 UE iso pushing into thighs 10\" 2x5 90-90 UE iso pushing into thighs 10\" 2x6 90-90 UE iso pushing into thighs 10\" x8  X6  x2 90-90 UE iso pushing into thighs 90-90 UE iso pushing into thighs 10\"x16   Paloff press Hold due to dizziness  Dbl blue x20 each way  Dbl blue x20 each way  Dbl blue x20 yrn  Dbl blue x20 yrn    Glute bridges  Regular bridges 2x10. Start with PPT then lift  Regular bridges 2x10. Start with PPT then lift  Regular bridges 2x10. Start with PPT then lift  Regular bridges 2x10. Start with PPT then lift  Regular bridges 2x10. Start with PPT then lift            Pt education        Ther Activity                        Gait Training                        Modalities        MHP                              "

## 2025-05-30 ENCOUNTER — RESULTS FOLLOW-UP (OUTPATIENT)
Dept: FAMILY MEDICINE CLINIC | Facility: CLINIC | Age: 70
End: 2025-05-30

## 2025-05-30 DIAGNOSIS — N18.32 TYPE 2 DIABETES MELLITUS WITH STAGE 3B CHRONIC KIDNEY DISEASE, WITHOUT LONG-TERM CURRENT USE OF INSULIN (HCC): Primary | ICD-10-CM

## 2025-05-30 DIAGNOSIS — E11.22 TYPE 2 DIABETES MELLITUS WITH STAGE 3B CHRONIC KIDNEY DISEASE, WITHOUT LONG-TERM CURRENT USE OF INSULIN (HCC): Primary | ICD-10-CM

## 2025-06-03 ENCOUNTER — APPOINTMENT (OUTPATIENT)
Dept: PHYSICAL THERAPY | Facility: CLINIC | Age: 70
End: 2025-06-03
Payer: MEDICARE

## 2025-06-04 ENCOUNTER — HOSPITAL ENCOUNTER (OUTPATIENT)
Age: 70
Discharge: HOME | End: 2025-06-04
Payer: MEDICARE

## 2025-06-04 VITALS — HEIGHT: 62 IN | BODY MASS INDEX: 25.42 KG/M2

## 2025-06-04 DIAGNOSIS — Z78.0 POST-MENOPAUSE: ICD-10-CM

## 2025-06-04 PROCEDURE — 77080 DXA BONE DENSITY AXIAL: CPT

## 2025-06-05 ENCOUNTER — OFFICE VISIT (OUTPATIENT)
Dept: PHYSICAL THERAPY | Facility: CLINIC | Age: 70
End: 2025-06-05
Payer: MEDICARE

## 2025-06-05 DIAGNOSIS — M46.1 SACROILIITIS (HCC): ICD-10-CM

## 2025-06-05 DIAGNOSIS — M48.062 SPINAL STENOSIS OF LUMBAR REGION WITH NEUROGENIC CLAUDICATION: ICD-10-CM

## 2025-06-05 DIAGNOSIS — M47.816 LUMBAR SPONDYLOSIS: Primary | ICD-10-CM

## 2025-06-05 PROCEDURE — 97110 THERAPEUTIC EXERCISES: CPT

## 2025-06-05 PROCEDURE — 97112 NEUROMUSCULAR REEDUCATION: CPT

## 2025-06-05 NOTE — PROGRESS NOTES
"Daily Note     Today's date: 2025  Patient name: Sam Peacock  : 1955  MRN: [id number]  Referring provider: Ceci Osborn CRNP  Dx:   Encounter Diagnosis     ICD-10-CM    1. Lumbar spondylosis  M47.816       2. Spinal stenosis of lumbar region with neurogenic claudication  M48.062       3. Sacroiliitis (HCC)  M46.1                      Subjective: Pt reports that she had to cancel her last session because the day before she went to the zoo in [place] and she [place] about 5 miles just in the zoo. She reports that her back actually felt pretty good and she did not have to stop and rest from pain. She walked up with big hill initially and then worked her way down through the zoo. Overall feeling good, the next day she was just exhausted.       Objective: See treatment diary below      Assessment: Tolerated treatment well. Pt demonstrating good technique with exercises, only requiring minimal cues for technique. Pt reported to feel good post session. Plan for d/c of her back next session and then transition to tx for her neck as this has been more bothersome for her and she is more confident with managing her back pan. Patient would benefit from continued PT      Plan: Plan for d/c after next treatment session      Precautions Hx: DM, HTN, kidney CA (nephrectomy)  HEP: spinal stenosis, spondylosis, [id number]        Manuals 25                                   Neuro Re-Ed         LTR 10\"x10 yrn 10\"x10 yrn 10\"x10 yrn 10\"x10 yrn  10\"x10 yrn    PPT 5\"x15  X15 5\" X15 5\" X15 5\"  X15  5\"    DKTC        Ball stretch  10x10\" fwd, and each side  10x10\" fwd, and each side 10x10\" fwd, and each side 10x10\" fwd, and each side  10x10\" fwd, and each side    Pball iso with ab brace 5\"x20 with slight crunch lifting head  5\"x20 with slight crunch lifting head  5\"x20 with slight crunch lifting head  5\"x20 with slight crunch lifting head  5\"x20 with slight crunch lifting head    Supine hip " "flexor stretch on table  With strap 10\"x12 yrn  With strap 10\"x12 yrn With strap 10\"x12 yrn With strap 10\"x12 yrn  With strap 10\"x12 yrn    Hamstring stretch  10\"x12 yrn edge of table  10\"x12 yrn edge of table 10\"x12 yrn edge of table 10\"x12 yrn edge of table 10\"x12 yrn edge of table            Ther Ex        treadmill Speed 2-2.5 10' on treadmill  Speed 2-2.5 10' on treadmill  Speed 2-2.5 10' on treadmill  Speed 2-2.5 10' on treadmill  Speed 2-2.5 10' on treadmill    Hip add Blue MB 5\"x20  Yellow MB 5\"x20 Yellow MB 5\"x20 Yellow MB 5\" x20 Yellow MB 5\" x20   Clamshells  Blue SL 3x10 yrn  Blue sidelying 3x10  Blue sidelying 3x10  Blue SL 3x10 yrn  Blue SL 3x10 yrn    Hip marching in hooklying  2x10 yrn 2x10 yrn 2x10 yrn 2x10 yrn  2x10 yrn    Pball squats  No weight 4x10 No weight 4x10  No weight 4x10 No weight 4x10 No weight 4x10   Ab brace with 90-90 hold  90-90 UE iso pushing into thighs 10\"x10 90-90 UE iso pushing into thighs 10\" 2x6 90-90 UE iso pushing into thighs 10\" x8  X6  x2 90-90 UE iso pushing into thighs 90-90 UE iso pushing into thighs 10\"x16   Paloff press Pt deferred due to wrist pain  Dbl blue x20 each way  Dbl blue x20 each way  Dbl blue x20 yrn  Dbl blue x20 yrn    Glute bridges  Regular bridges 3x10. Start with PPT then lift  Regular bridges 2x10. Start with PPT then lift  Regular bridges 2x10. Start with PPT then lift  Regular bridges 2x10. Start with PPT then lift  Regular bridges 2x10. Start with PPT then lift            Pt education        Ther Activity                        Gait Training                        Modalities        MHP                                "

## 2025-06-09 ENCOUNTER — OFFICE VISIT (OUTPATIENT)
Dept: PHYSICAL THERAPY | Facility: CLINIC | Age: 70
End: 2025-06-09
Payer: MEDICARE

## 2025-06-09 DIAGNOSIS — M50.30 DDD (DEGENERATIVE DISC DISEASE), CERVICAL: Primary | ICD-10-CM

## 2025-06-09 DIAGNOSIS — M48.062 SPINAL STENOSIS OF LUMBAR REGION WITH NEUROGENIC CLAUDICATION: ICD-10-CM

## 2025-06-09 DIAGNOSIS — M46.1 SACROILIITIS (HCC): ICD-10-CM

## 2025-06-09 DIAGNOSIS — M47.816 LUMBAR SPONDYLOSIS: Primary | ICD-10-CM

## 2025-06-09 PROCEDURE — 97112 NEUROMUSCULAR REEDUCATION: CPT

## 2025-06-09 PROCEDURE — 97110 THERAPEUTIC EXERCISES: CPT

## 2025-06-09 NOTE — PROGRESS NOTES
"Daily Note     Today's date: 2025  Patient name: Sam Peacock  : 1955  MRN: [id number]  Referring provider: Ceci Osborn CRNP  Dx:   Encounter Diagnosis     ICD-10-CM    1. Lumbar spondylosis  M47.816       2. Spinal stenosis of lumbar region with neurogenic claudication  M48.062       3. Sacroiliitis (HCC)  M46.1           Start Time: 0904          Subjective: Pt reports that she is doing well, she walked at the park yesterday and she got in 4 rounds before she felt the need to stretch her back out.       Objective: See treatment diary below      Assessment: Tolerated treatment well. Discussed current HEP with patient and she feels fully independent with this. Pt felt good post session. Patient exhibited good technique with therapeutic exercises      Plan: Plan for d/c of back tx after todays session, continue with HEP and transition to tx for her neck as this has been more bothersome. Will eval her neck NV.     Precautions Hx: DM, HTN, kidney CA (nephrectomy)  HEP: spinal stenosis, spondylosis, [id number]        Manuals 25                                   Neuro Re-Ed         LTR 10\"x10 yrn 10\"x10 yrn 10\"x10 yrn 10\"x10 yrn  10\"x10 yrn    PPT 5\"x15  5\"x20 X15 5\" X15 5\"  X15  5\"    DKTC        Ball stretch  10x10\" fwd, and each side  10x10\" fwd, and each side  10x10\" fwd, and each side 10x10\" fwd, and each side  10x10\" fwd, and each side    Pball iso with ab brace 5\"x20 with slight crunch lifting head  5\"x20 with slight crunch lifting head  5\"x20 with slight crunch lifting head  5\"x20 with slight crunch lifting head  5\"x20 with slight crunch lifting head    Supine hip flexor stretch on table  With strap 10\"x12 yrn  With strap 10\"x12 yrn  With strap 10\"x12 yrn With strap 10\"x12 yrn  With strap 10\"x12 yrn    Hamstring stretch  10\"x12 yrn edge of table  10\"x12 yrn edge of table  10\"x12 yrn edge of table 10\"x12 yrn edge of table 10\"x12 yrn edge of table          " "  Ther Ex        treadmill Speed 2-2.5 10' on treadmill  Speed 2-2.5 10' on treadmill  Speed 2-2.5 10' on treadmill  Speed 2-2.5 10' on treadmill  Speed 2-2.5 10' on treadmill    Hip add Blue MB 5\"x20  Blue MB 5\"x20  Yellow MB 5\"x20 Yellow MB 5\" x20 Yellow MB 5\" x20   Clamshells  Blue SL 3x10 yrn  SL Blk 3x10 yrn Blue sidelying 3x10  Blue SL 3x10 yrn  Blue SL 3x10 yrn    Hip marching in hooklying  2x10 yrn 3x10 yrn 2x10 yrn 2x10 yrn  2x10 yrn    Pball squats  No weight 4x10 4x10  No weight 4x10 No weight 4x10 No weight 4x10   Ab brace with 90-90 hold  90-90 UE iso pushing into thighs 10\"x10 90-90 UE iso pushing into thighs 10\"x10 90-90 UE iso pushing into thighs 10\" x8  X6  x2 90-90 UE iso pushing into thighs 90-90 UE iso pushing into thighs 10\"x16   Paloff press Pt deferred due to wrist pain  Hold  Dbl blue x20 each way  Dbl blue x20 yrn  Dbl blue x20 yrn    Glute bridges  Regular bridges 3x10. Start with PPT then lift  Reg bridges with blue band 3x10 with PPT Regular bridges 2x10. Start with PPT then lift  Regular bridges 2x10. Start with PPT then lift  Regular bridges 2x10. Start with PPT then lift            Pt education        Ther Activity                        Gait Training                        Modalities        MHP                                  "

## 2025-06-13 ENCOUNTER — EVALUATION (OUTPATIENT)
Dept: PHYSICAL THERAPY | Facility: CLINIC | Age: 70
End: 2025-06-13
Payer: MEDICARE

## 2025-06-13 DIAGNOSIS — G89.29 CHRONIC NECK PAIN: Primary | ICD-10-CM

## 2025-06-13 DIAGNOSIS — M54.2 CHRONIC NECK PAIN: Primary | ICD-10-CM

## 2025-06-13 DIAGNOSIS — M50.30 DDD (DEGENERATIVE DISC DISEASE), CERVICAL: ICD-10-CM

## 2025-06-13 PROCEDURE — 97112 NEUROMUSCULAR REEDUCATION: CPT

## 2025-06-13 PROCEDURE — 97162 PT EVAL MOD COMPLEX 30 MIN: CPT

## 2025-06-13 PROCEDURE — 97110 THERAPEUTIC EXERCISES: CPT

## 2025-06-13 NOTE — PROGRESS NOTES
PT Evaluation     Today's date: 2025  Patient name: Sam Peacock  : 1955  MRN: [id number]  Referring provider: Liang Milligan MD  Dx:   Encounter Diagnosis     ICD-10-CM    1. Chronic neck pain  M54.2     G89.29       2. DDD (degenerative disc disease), cervical  M50.30                      Assessment  Impairments: abnormal muscle tone, abnormal or restricted ROM, impaired physical strength, lacks appropriate home exercise program, pain with function, poor posture , poor body mechanics and activity limitations  Symptom irritability: moderate    Assessment details: Patient is a 69 year old female presenting to this facility with complaints of neck pain that has been going on 3 years in duration. No radicular symptoms. Pt was recently treated here for her low back but this has improved and she is transitioning to PT for her cervical spine. Pt enjoys walking 3-4 times a week and gardening. Upon evaluation, patient demonstrated good shoulder ROM and neck strength. Deficits in cervical ROM. Increased straightening of cervical lordosis. Increased stiffness in upper thoracic spine and slightly rounded. Increased tenderness over lower cervical and upper thoracic psp and SP with increased tension. Rounded shoulders in seated. Pt was provided with neck pain handout and ergonomics handout while on computer. Pt was also issued HEP to begin targeting deficits noted above. Pt expressed verbal understanding of all exercises and recommendations. Pt would benefit from skilled PT to address deficits above in order to decrease pain, improve functional mobility, and to maximize independence with ADLs.   Understanding of Dx/Px/POC: good     Prognosis: good    Goals  ST.  Decreased pain 50% 6 wk   2.  Pt will report 50% decrease in tightness midday 6 wk  3.  Increase c-spine ROM 8 degrees 6wk  4.  Increased c-spine strength to 5/5 or good +6 wk   5. Pt will report no difficulty with light ADL's 6 wk    LT.  Pt  will report no pain 12 wk  2.   Increase c-spine AROM to WNL 12 wk  3.  Increase c-spine strength to WNL 12 wk  5.  Pt will report no limitations with ADL's 12 wk      Plan  Patient would benefit from: PT eval and skilled physical therapy  Planned modality interventions: cryotherapy and thermotherapy: hydrocollator packs    Planned therapy interventions: ADL retraining, body mechanics training, flexibility, functional ROM exercises, graded exercise, home exercise program, manual therapy, neuromuscular re-education, patient/caregiver education, postural training, self care, strengthening, stretching, therapeutic exercise and therapeutic activities    Frequency: 2x week  Duration in weeks: 12  Treatment plan discussed with: patient        Subjective Evaluation    History of Present Illness  Mechanism of injury: Patient is a 69 year old female presenting to this facility with complaints of pain in her neck that has been going on for 3 years in duration. She feels that it is getting worse recently. Pain is located more toward her lower neck over the spine in middle and on both sides in the muscles. She feels like her neck is out of place. She thought about going to a chiropractor but she is too scared to go. She just feels that if she goes up her spine, the top L of her neck is out a little bit. She has not tried any tx for it yet other than heating pad and self massage.   She usually falls asleep with the heating pad on and this seems to help. It does not take the pain away. Pain is described as tightness all the time. It hurts her all of the time as well just different intensities.  Even though she feels like the left side is out and pushed over, she has pain both sides. She is unsure if there is any numbness or tingling associated with this. Not at first but lately she finds that her hands are shaky at times. Not sure if this is from the neck or something else. Denies any abnormal weakness in the hands. Sensation  side to side in hands and arms feels normal. Denies radiating pain. Pt does enjoy walking regularly and garden. She feels that she can do most of her hobbies and ADLs without limitations from her neck, if anything she gets limited by the back at times. Some heavier things may give her difficulty such as lifting a box overhead. She has increased pain with driving. Pain is better when she first gets up and worsens throughout the day.   When she brings her head backwards and rolls it, this tends to feel good for it but then coming back up makes it feel tight. Sitting up straight usually makes it feel better too. She does spend a lot of time on the computer, she used to do this for her job too.     Quality of life: good    Patient Goals  Patient goals for therapy: decreased pain, increased motion, increased strength and independence with ADLs/IADLs  Patient goal: get throught the day with less pain and stiffness  Pain  Current pain rating: 3  At best pain ratin  At worst pain ratin  Location: lower neck both sides and right over the spine  Quality: tight, discomfort and dull ache  Relieving factors: heat, change in position and rest (self massage)  Exacerbated by: gets worse with time throughout day, driving.  Progression: worsening      Diagnostic Tests  Abnormal x-ray:  degenerative changes.  Treatments  No previous or current treatments        Objective     Static Posture     Head  Forward.    Shoulders  Rounded.    Postural Observations  Seated posture: fair  Standing posture: fair      Palpation   Left   Hypertonic in the levator scapulae.   Tenderness of the cervical paraspinals, levator scapulae, lower trapezius, middle trapezius, scalenes, thoracic paraspinals and upper trapezius.     Right   Hypertonic in the levator scapulae.   Tenderness of the cervical paraspinals, levator scapulae, lower trapezius, middle trapezius, scalenes, thoracic paraspinals and upper trapezius.     Left Shoulder  Comments  Left thoracic paraspinals: upper.     Right Shoulder Comments  Right triceps: upper.     Tenderness   Cervical Spine   Tenderness in the spinous process.     Additional Tenderness Details  C7-T4    Neurological Testing     Sensation   Cervical/Thoracic   Left   Intact: light touch    Right   Intact: light touch    Active Range of Motion   Cervical/Thoracic Spine       Cervical    Flexion:  WFL  Extension: 30 degrees      Left lateral flexion: 30 degrees     with pain  Right lateral flexion: 20 degrees      Left rotation: 60 degrees with pain  Right rotation: 60 degrees    with pain  Left Shoulder   Normal active range of motion    Right Shoulder   Normal active range of motion    Strength/Myotome Testing   Cervical Spine   Neck extension: 5  Neck flexion: 4    Left   Neck lateral flexion (C3): 4+    Right   Neck lateral flexion (C3): 4+    Left Shoulder     Isolated Muscles   Upper trapezius: 5     Right Shoulder     Isolated Muscles   Upper trapezius: 5     Tests     Additional Tests Details  No special testing today, no reports of any radicular sx, not needed at this time.   Graphical documentation:           Flowsheet Rows      Flowsheet Row Most Recent Value   PT/OT G-Codes    Current Score 51   Projected Score 60                  Precautions Hx: CA, Diabetes, CKD, OA  [id number]        Manuals Eval 6-13-25       STM to upper trap and lower c-spine and upper t-spine psp                                Neuro Re-Ed         Levator stretch         T-spine extension over roll         Foam roll up wall         Scap squeeze         Doorway arms low         Shoulder rolls                 Ther Ex        UBE        MTP/LTP        Banded pull apart        Lat pull down         Rows on machine         Push ups off handrail                 Pt edu Pt edu provided on neck pain, ergonomics, and issued HEP       Ther Activity                        Gait Training                        Modalities

## 2025-06-16 ENCOUNTER — HOSPITAL ENCOUNTER (OUTPATIENT)
Dept: MAMMOGRAPHY | Facility: CLINIC | Age: 70
Discharge: HOME | End: 2025-06-16
Payer: MEDICARE

## 2025-06-16 DIAGNOSIS — Z12.31 ENCOUNTER FOR SCREENING MAMMOGRAM FOR BREAST CANCER: ICD-10-CM

## 2025-06-16 PROCEDURE — 77067 SCR MAMMO BI INCL CAD: CPT

## 2025-06-16 PROCEDURE — 77063 BREAST TOMOSYNTHESIS BI: CPT

## 2025-06-18 ENCOUNTER — OFFICE VISIT (OUTPATIENT)
Dept: PHYSICAL THERAPY | Facility: CLINIC | Age: 70
End: 2025-06-18
Payer: MEDICARE

## 2025-06-18 DIAGNOSIS — M50.30 DDD (DEGENERATIVE DISC DISEASE), CERVICAL: ICD-10-CM

## 2025-06-18 DIAGNOSIS — G89.29 CHRONIC BILATERAL LOW BACK PAIN, UNSPECIFIED WHETHER SCIATICA PRESENT: ICD-10-CM

## 2025-06-18 DIAGNOSIS — G89.29 CHRONIC NECK PAIN: Primary | ICD-10-CM

## 2025-06-18 DIAGNOSIS — I10 PRIMARY HYPERTENSION: ICD-10-CM

## 2025-06-18 DIAGNOSIS — M54.50 CHRONIC BILATERAL LOW BACK PAIN, UNSPECIFIED WHETHER SCIATICA PRESENT: ICD-10-CM

## 2025-06-18 DIAGNOSIS — M54.2 CHRONIC NECK PAIN: Primary | ICD-10-CM

## 2025-06-18 PROCEDURE — 97112 NEUROMUSCULAR REEDUCATION: CPT

## 2025-06-18 PROCEDURE — 97110 THERAPEUTIC EXERCISES: CPT

## 2025-06-18 PROCEDURE — 97140 MANUAL THERAPY 1/> REGIONS: CPT

## 2025-06-18 RX ORDER — ATENOLOL 50 MG/1
50 TABLET ORAL EVERY MORNING
Qty: 90 TABLET | Refills: 1 | Status: SHIPPED | OUTPATIENT
Start: 2025-06-18

## 2025-06-18 NOTE — PROGRESS NOTES
"Daily Note     Today's date: 2025  Patient name: Sam Peacock  : 1955  MRN: [id number]  Referring provider: Ceci Osborn CRNP  Dx:   Encounter Diagnosis     ICD-10-CM    1. Chronic neck pain  M54.2     G89.29       2. DDD (degenerative disc disease), cervical  M50.30                      Subjective: Erma reports not being compliant with HEP as of yet. She reports continued pain to cervical spine and feels L side is out of place. She notes pain at rest and with movement.       Objective: See treatment diary below      Assessment: Initiated pt POC this visit with visual,verbal,tactile cueing to ensure correct exercise technique in order to facilitate appropriate musculature. Improvement in soft tissue quality to yrn cerv psp, yrn UT, yrn levator scap, yrn thoracic psp following STM. Less pain with levator scap stretch t/o handheld support. Tactile cues to ensure posterior tilt of yrn scap during strengthening and postural exercises. Pt would continue to benefit from skilled PT.       Plan: Continue with current POC to address pt deficits.      Manuals Eval 25      STM to upper trap and lower c-spine and upper t-spine psp  12' w/cream                               Neuro Re-Ed         Levator stretch   10\"x12 yrn UE initially then no UE due to p! Less pain without HH support      T-spine extension over roll   5\"x15      Foam roll up wall   5-10\"x10      Scap squeeze   5\" x20      Doorway arms low   10\"x12      Shoulder rolls   Retro x20              Ther Ex        UBE  6' alt       MTP/LTP  Grn 2x10 ea       Banded pull apart        Lat pull down         Rows on machine         Push ups off handrail                 Pt edu Pt edu provided on neck pain, ergonomics, and issued HEP       Ther Activity                        Gait Training                        Modalities        MHP  MHP 10' c-spine                    "

## 2025-06-20 ENCOUNTER — OFFICE VISIT (OUTPATIENT)
Dept: PHYSICAL THERAPY | Facility: CLINIC | Age: 70
End: 2025-06-20
Payer: MEDICARE

## 2025-06-20 DIAGNOSIS — G89.29 CHRONIC NECK PAIN: Primary | ICD-10-CM

## 2025-06-20 DIAGNOSIS — M48.062 SPINAL STENOSIS OF LUMBAR REGION WITH NEUROGENIC CLAUDICATION: ICD-10-CM

## 2025-06-20 DIAGNOSIS — M46.1 SACROILIITIS (HCC): ICD-10-CM

## 2025-06-20 DIAGNOSIS — M54.2 CHRONIC NECK PAIN: Primary | ICD-10-CM

## 2025-06-20 DIAGNOSIS — M47.816 LUMBAR SPONDYLOSIS: ICD-10-CM

## 2025-06-20 DIAGNOSIS — M50.30 DDD (DEGENERATIVE DISC DISEASE), CERVICAL: ICD-10-CM

## 2025-06-20 PROCEDURE — 97110 THERAPEUTIC EXERCISES: CPT

## 2025-06-20 PROCEDURE — 97140 MANUAL THERAPY 1/> REGIONS: CPT

## 2025-06-20 NOTE — PROGRESS NOTES
"Daily Note     Today's date: 2025  Patient name: Sam Peacock  : 1955  MRN: [id number]  Referring provider: Ceci Osborn CRNP  Dx:   Encounter Diagnosis     ICD-10-CM    1. Chronic neck pain  M54.2     G89.29       2. DDD (degenerative disc disease), cervical  M50.30       3. Lumbar spondylosis  M47.816       4. Spinal stenosis of lumbar region with neurogenic claudication  M48.062       5. Sacroiliitis (HCC)  M46.1                      Subjective: Pt reports that her neck is sore today, about the same as usual. She did feel relief in her neck pain after her first session but then it eventually came back.      Objective: See treatment diary below      Assessment: Tolerated treatment well. Pt tolerated all exercises well with no complaints of pain or discomfort. Able to progress program today. Verbal and visual cues provided to ensure proper mechanics. Pt reported to feel good post session. Patient would benefit from continued PT      Plan: Progress treatment as tolerated.         Manuals Eval 25     STM to upper trap and lower c-spine and upper t-spine psp  12' w/cream  12' w/cream                              Neuro Re-Ed         Levator stretch   10\"x12 yrn UE initially then no UE due to p! Less pain without HH support 10\"x12 yrn levator with overpressure      T-spine extension over roll   5\"x15 5\"x10 at 2 locations      Foam roll up wall   5-10\"x10 X15 lean in at top     Scap squeeze   5\" x20 5\"x20     Doorway arms low   10\"x12 10\"x12 arms low and elbows extended      Shoulder rolls   Retro x20 Bwd x20              Ther Ex        UBE  6' alt  8' alt      MTP/LTP  Grn 2x10 ea  Blue 3x10 each      Banded pull apart   Green 2x10      Lat pull down         Rows on machine         Push ups off handrail    Push up off plinth 2x8             Pt edu Pt edu provided on neck pain, ergonomics, and issued HEP       Ther Activity                        Gait Training                     "    Modalities        MHP  MHP 10' c-spine  Deferred

## 2025-06-24 ENCOUNTER — APPOINTMENT (OUTPATIENT)
Dept: PHYSICAL THERAPY | Facility: CLINIC | Age: 70
End: 2025-06-24
Payer: MEDICARE

## 2025-06-26 ENCOUNTER — OFFICE VISIT (OUTPATIENT)
Dept: PHYSICAL THERAPY | Facility: CLINIC | Age: 70
End: 2025-06-26
Payer: MEDICARE

## 2025-06-26 DIAGNOSIS — G89.29 CHRONIC NECK PAIN: Primary | ICD-10-CM

## 2025-06-26 DIAGNOSIS — M54.2 CHRONIC NECK PAIN: Primary | ICD-10-CM

## 2025-06-26 DIAGNOSIS — M47.816 LUMBAR SPONDYLOSIS: ICD-10-CM

## 2025-06-26 DIAGNOSIS — M50.30 DDD (DEGENERATIVE DISC DISEASE), CERVICAL: ICD-10-CM

## 2025-06-26 DIAGNOSIS — M46.1 SACROILIITIS (HCC): ICD-10-CM

## 2025-06-26 DIAGNOSIS — M48.062 SPINAL STENOSIS OF LUMBAR REGION WITH NEUROGENIC CLAUDICATION: ICD-10-CM

## 2025-06-26 PROCEDURE — 97110 THERAPEUTIC EXERCISES: CPT

## 2025-06-26 PROCEDURE — 97140 MANUAL THERAPY 1/> REGIONS: CPT

## 2025-06-26 NOTE — PROGRESS NOTES
"Daily Note     Today's date: 2025  Patient name: Sam Peacock  : 1955  MRN: [id number]  Referring provider: Ceci Osborn CRNP  Dx:   Encounter Diagnosis     ICD-10-CM    1. Chronic neck pain  M54.2     G89.29       2. DDD (degenerative disc disease), cervical  M50.30       3. Lumbar spondylosis  M47.816       4. Spinal stenosis of lumbar region with neurogenic claudication  M48.062       5. Sacroiliitis (HCC)  M46.1                      Subjective: Pt reports that her neck has actually been feeling good and less achy. She might have to come for her hip next.      Objective: See treatment diary below      Assessment: Tolerated treatment well. Pt tolerated all exercises well. Less tightness and tension noted with manuals today. Pt reported to feel good post exercise and deferred MHP, she will do it at home. Minimal cues provided to ensure proper mechanics. Patient would benefit from continued PT      Plan: Progress treatment as tolerated.         Manuals Eval 25    STM to upper trap and lower c-spine and upper t-spine psp  12' w/cream  12' w/cream  8' with cream                            Neuro Re-Ed         Levator stretch   10\"x12 yrn UE initially then no UE due to p! Less pain without HH support 10\"x12 yrn levator with overpressure  10\"x12 yrn levator with overpressure     T-spine extension over roll   5\"x15 5\"x10 at 2 locations  5\"x10 at 2 locations     Foam roll up wall   5-10\"x10 X15 lean in at top X15 lean in at top    Scap squeeze   5\" x20 5\"x20 5\"x20    Doorway arms low   10\"x12 10\"x12 arms low and elbows extended  5\"x20    Shoulder rolls   Retro x20 Bwd x20  Bwd x20             Ther Ex        UBE  6' alt  8' alt  8' alt     MTP/LTP  Grn 2x10 ea  Blue 3x10 each  Blue 3x10 each     Banded pull apart   Green 2x10  Green 2x10     Lat pull down         Rows on machine         Push ups off handrail    Push up off plinth 2x8 Push up off plinth 2x8            Pt edu " Pt edu provided on neck pain, ergonomics, and issued HEP       Ther Activity                        Gait Training                        Modalities        MHP  MHP 10' c-spine  Deferred

## 2025-07-01 ENCOUNTER — OFFICE VISIT (OUTPATIENT)
Dept: PHYSICAL THERAPY | Facility: CLINIC | Age: 70
End: 2025-07-01
Payer: MEDICARE

## 2025-07-01 DIAGNOSIS — M54.2 CHRONIC NECK PAIN: Primary | ICD-10-CM

## 2025-07-01 DIAGNOSIS — G89.29 CHRONIC NECK PAIN: Primary | ICD-10-CM

## 2025-07-01 DIAGNOSIS — M50.30 DDD (DEGENERATIVE DISC DISEASE), CERVICAL: ICD-10-CM

## 2025-07-01 DIAGNOSIS — M46.1 SACROILIITIS (HCC): ICD-10-CM

## 2025-07-01 PROCEDURE — 97112 NEUROMUSCULAR REEDUCATION: CPT

## 2025-07-01 PROCEDURE — 97110 THERAPEUTIC EXERCISES: CPT

## 2025-07-01 NOTE — PROGRESS NOTES
"Daily Note     Today's date: 2025  Patient name: Sam Peacock  : 1955  MRN: [id number]  Referring provider: Ceci Osborn CRNP  Dx:   Encounter Diagnosis     ICD-10-CM    1. Chronic neck pain  M54.2     G89.29       2. DDD (degenerative disc disease), cervical  M50.30       3. Lumbar spondylosis  M47.816       4. Spinal stenosis of lumbar region with neurogenic claudication  M48.062       5. Sacroiliitis (HCC)  M46.1                      Subjective: Pt reports that she is still feeling pretty good in regards to her neck. She has some soreness near the middle of her shoulder blades but much better than it was. She just cam back from a vacation.       Objective: See treatment diary below      Assessment: Tolerated treatment well. Pt did well with all exercises today. Added progressions to program today with no complaints of increased pain. Pt reported to feel good post session. Continue with current program and progress as able.  Patient would benefit from continued PT      Plan: Progress treatment as tolerated.         Manuals Eval 25   STM to upper trap and lower c-spine and upper t-spine psp  12' w/cream  12' w/cream  8' with cream 6' STM with cream to upper and middle traps and minimal to upper c/s psp SMT                           Neuro Re-Ed         Levator stretch   10\"x12 yrn UE initially then no UE due to p! Less pain without HH support 10\"x12 yrn levator with overpressure  10\"x12 yrn levator with overpressure  10\"x12 yrn levator with overpressure    T-spine extension over roll   5\"x15 5\"x10 at 2 locations  5\"x10 at 2 locations  5\"x10 at 2 locations    Foam roll up wall   5-10\"x10 X15 lean in at top X15 lean in at top X15 lean in at top   Scap squeeze   5\" x20 5\"x20 5\"x20 5\"x20   Doorway arms low   10\"x12 10\"x12 arms low and elbows extended  10\"x12 10\"x12 arms low    Shoulder rolls   Retro x20 Bwd x20  Bwd x20  Bwd x20            Ther Ex        UBE  6' " alt  8' alt  8' alt  10' alt Lv1   MTP/LTP  Grn 2x10 ea  Blue 3x10 each  Blue 3x10 each  Blue 3x10 each    Banded pull apart   Green 2x10  Green 2x10  Green 3x10    Lat pull down      25# 3x10    Rows on machine         Push ups off handrail    Push up off plinth 2x8 Push up off plinth 2x8 Push up off plinth 2x10           Pt edu Pt edu provided on neck pain, ergonomics, and issued HEP       Ther Activity                        Gait Training                        Modalities        MHP  MHP 10' c-spine  Deferred

## 2025-07-03 ENCOUNTER — OFFICE VISIT (OUTPATIENT)
Dept: PHYSICAL THERAPY | Facility: CLINIC | Age: 70
End: 2025-07-03
Payer: MEDICARE

## 2025-07-03 DIAGNOSIS — G89.29 CHRONIC NECK PAIN: Primary | ICD-10-CM

## 2025-07-03 DIAGNOSIS — M48.062 SPINAL STENOSIS OF LUMBAR REGION WITH NEUROGENIC CLAUDICATION: ICD-10-CM

## 2025-07-03 DIAGNOSIS — M47.816 LUMBAR SPONDYLOSIS: ICD-10-CM

## 2025-07-03 DIAGNOSIS — M46.1 SACROILIITIS (HCC): ICD-10-CM

## 2025-07-03 DIAGNOSIS — M50.30 DDD (DEGENERATIVE DISC DISEASE), CERVICAL: ICD-10-CM

## 2025-07-03 DIAGNOSIS — M54.2 CHRONIC NECK PAIN: Primary | ICD-10-CM

## 2025-07-03 PROCEDURE — 97140 MANUAL THERAPY 1/> REGIONS: CPT

## 2025-07-03 PROCEDURE — 97110 THERAPEUTIC EXERCISES: CPT

## 2025-07-03 NOTE — PROGRESS NOTES
"Daily Note     Today's date: 7/3/2025  Patient name: Sam Peacock  : 1955  MRN: [id number]  Referring provider: Ceci Osborn CRNP  Dx:   Encounter Diagnosis     ICD-10-CM    1. Chronic neck pain  M54.2     G89.29       2. DDD (degenerative disc disease), cervical  M50.30       3. Sacroiliitis (HCC)  M46.1       4. Lumbar spondylosis  M47.816       5. Spinal stenosis of lumbar region with neurogenic claudication  M48.062           Start Time: 815  Stop Time: 910  Total time in clinic (min): 55 minutes    Subjective: The patient states that she is feeling good today. She states that her chief complaint today is tightness along her spine in her neck and upper thoracic region.       Objective: See treatment diary below      Assessment: Tolerated treatment well. The patient was able to complete all exercises with no increased complaints of pain. Patient had significant tightness in her bilateral UT's and MT's, which decreased after manual therapy. Patient exhibited good technique with therapeutic exercises and would benefit from continued PT      Plan: Continue per plan of care.        Manuals 7-3-25 6-1825   STM to upper trap and lower c-spine and upper t-spine psp 10' STM with cream to UT's and MT's 12' w/cream  12' w/cream  8' with cream 6' STM with cream to upper and middle traps and minimal to upper c/s psp SMT                           Neuro Re-Ed         Levator stretch  10\"x12 yrn levator with overpressure 10\"x12 yrn UE initially then no UE due to p! Less pain without HH support 10\"x12 yrn levator with overpressure  10\"x12 yrn levator with overpressure  10\"x12 yrn levator with overpressure    T-spine extension over roll  5\"x10 at 2 locations 5\"x15 5\"x10 at 2 locations  5\"x10 at 2 locations  5\"x10 at 2 locations    Foam roll up wall  X15 lean in at top 5-10\"x10 X15 lean in at top X15 lean in at top X15 lean in at top   Scap squeeze  5\" x20 5\" x20 5\"x20 5\"x20 5\"x20 " "  Doorway arms low  10\"x12 arms low 10\"x12 10\"x12 arms low and elbows extended  10\"x12 10\"x12 arms low    Shoulder rolls  Retro 20x Retro x20 Bwd x20  Bwd x20  Bwd x20            Ther Ex        UBE 10' alt Lv1 6' alt  8' alt  8' alt  10' alt Lv1   MTP/LTP Blue 3x10 ea Grn 2x10 ea  Blue 3x10 each  Blue 3x10 each  Blue 3x10 each    Banded pull apart Green 3x10  Green 2x10  Green 2x10  Green 3x10    Lat pull down  25# 3x10    25# 3x10    Rows on machine         Push ups off handrail  Push up off plinth 2x10  Push up off plinth 2x8 Push up off plinth 2x8 Push up off plinth 2x10           Pt edu        Ther Activity                        Gait Training                        Modalities        MHP  MHP 10' c-spine  Deferred                           "

## 2025-07-08 ENCOUNTER — OFFICE VISIT (OUTPATIENT)
Dept: OBGYN CLINIC | Facility: CLINIC | Age: 70
End: 2025-07-08
Payer: MEDICARE

## 2025-07-08 VITALS — BODY MASS INDEX: 25.03 KG/M2 | HEIGHT: 62 IN | WEIGHT: 136 LBS

## 2025-07-08 DIAGNOSIS — M65.4 DE QUERVAIN'S DISEASE (TENOSYNOVITIS): Primary | ICD-10-CM

## 2025-07-08 PROCEDURE — 99213 OFFICE O/P EST LOW 20 MIN: CPT | Performed by: ORTHOPAEDIC SURGERY

## 2025-07-08 RX ORDER — CEFAZOLIN SODIUM 2 G/50ML
2000 SOLUTION INTRAVENOUS ONCE
OUTPATIENT
Start: 2025-07-08 | End: 2025-07-08

## 2025-07-08 RX ORDER — CHLORHEXIDINE GLUCONATE ORAL RINSE 1.2 MG/ML
15 SOLUTION DENTAL ONCE
OUTPATIENT
Start: 2025-07-08 | End: 2025-07-08

## 2025-07-08 RX ORDER — CHLORHEXIDINE GLUCONATE 40 MG/ML
SOLUTION TOPICAL DAILY PRN
OUTPATIENT
Start: 2025-07-08

## 2025-07-08 RX ORDER — SODIUM CHLORIDE, SODIUM LACTATE, POTASSIUM CHLORIDE, CALCIUM CHLORIDE 600; 310; 30; 20 MG/100ML; MG/100ML; MG/100ML; MG/100ML
20 INJECTION, SOLUTION INTRAVENOUS CONTINUOUS
OUTPATIENT
Start: 2025-07-08

## 2025-07-08 NOTE — PROGRESS NOTES
Assessment & Plan  De Quervain's disease (tenosynovitis)  The patient was seen and examined.  She states that her symptoms are worsening and starting to affect her quality of life.  After exhausting conservative treatment, the risks and benefits of surgery were discussed with the patient.  She decided on an elective right first dorsal compartment release.  The patient surgery is tentatively scheduled for August 20, 2025.  Orders:    Case request operating room: RELEASE DEQUERVAINS; Standing          The patient has symptomatic right first dorsal compartment tenosynovitis.  Treatment options were discussed.  She is opted for elective right first dorsal compartment release.  All risk, complications, benefits were discussed with the patient in great detail including bleeding, infection, blood clots, pain, stiffness, neurovascular damage, fractures, dislocations, the possibility less likely surgery, heart attack, stroke, wound problems, tendon and ligament damage, etc.  Her surgery scheduled for August 20, 2025    Return for surgery.      _____________________________________________________  CHIEF COMPLAINT:  Chief Complaint   Patient presents with    Right Wrist - Pain         SUBJECTIVE:  Sam Peacock is a 69 y.o. female who presents to our office for a follow-up visit.  The patient has a history of de Quervain's tenosynovitis of bilateral wrist, worse along the right.  In the past, she has had corticosteroid injections.  She states that her right wrist pain is worsening.  She denies any fever or chills.    The following portions of the patient's history were reviewed and updated as appropriate: allergies, current medications, past family history, past medical history, past social history, past surgical history and problem list.    PAST MEDICAL HISTORY:  Past Medical History[1]    PAST SURGICAL HISTORY:  Past Surgical History[2]    FAMILY HISTORY:  Family History[3]    SOCIAL HISTORY:  Social  "History[4]    MEDICATIONS:  Current Medications[5]    ALLERGIES:  Allergies[6]    ROS:  Review of Systems     Constitutional: Negative for fatigue, fever or loss of appetite.   HENT: Negative.    Respiratory: Negative for shortness of breath, dyspnea.    Cardiovascular: Negative for chest pain/tightness.   Gastrointestinal: Negative for abdominal pain, N/V.   Endocrine: Negative for cold/heat intolerance, unexplained weight loss/gain.   Genitourinary: Negative for flank pain, dysuria, hematuria.   Musculoskeletal: Positive for arthralgia   Skin: Negative for rash.    Neurological: Negative for numbness or tingling  Psychiatric/Behavioral: Negative for agitation.  _____________________________________________________  PHYSICAL EXAMINATION:    Height 5' 2\" (1.575 m), weight 61.7 kg (136 lb).    Constitutional: Oriented to person, place, and time. Appears well-developed and well-nourished. No distress.   HENT:   Head: Normocephalic.   Eyes: Conjunctivae are normal. Right eye exhibits no discharge. Left eye exhibits no discharge. No scleral icterus.   Cardiovascular: Normal rate.    Pulmonary/Chest: Effort normal.   Neurological: Alert and oriented to person, place, and time.   Skin: Skin is warm and dry. No rash noted. Not diaphoretic. No erythema. No pallor.   Psychiatric: Normal mood and affect. Behavior is normal. Judgment and thought content normal.      MUSCULOSKELETAL EXAMINATION:   Physical Exam  Ortho Exam    Right upper extremity is neurovascularly intact  Fingers are pink and mobile  Compartments are soft  Positive Finkelstein  Brisk cap refill  Sensation intact  Cardiovascular: Normal rate    Pulmonary: Effort normal  Abdomen: Soft, nontender, nondistended   Objective:  BP Readings from Last 1 Encounters:   05/27/25 122/76      Wt Readings from Last 1 Encounters:   07/08/25 61.7 kg (136 lb)        BMI:   Estimated body mass index is 24.87 kg/m² as calculated from the following:    Height as of this " "encounter: 5' 2\" (1.575 m).    Weight as of this encounter: 61.7 kg (136 lb).        Scribe Attestation      I,:  Bari Rock PA-C am acting as a scribe while in the presence of the attending physician.:       I,:  Riccardo Fairbanks DO personally performed the services described in this documentation    as scribed in my presence.:                   [1]   Past Medical History:  Diagnosis Date    Arthritis     Cancer (HCC) 03/30/2016    Rt Kidney Removed    Chronic kidney disease     Diabetes mellitus (HCC)     Diabetes type 2, controlled (HCC)     Fallopian tube disorder     fills with fluid    Hypertension     Kidney cysts     Multiple thyroid nodules     Osteoarthritis     Stage 3 chronic kidney disease (HCC)    [2]   Past Surgical History:  Procedure Laterality Date    KIDNEY SURGERY Right     removal    TUBAL LIGATION Right     WRIST SURGERY Bilateral     basal joint soft tissue interpositional arthroplast L thumb 04/22 , 01/17/2024   [3]   Family History  Problem Relation Name Age of Onset    Heart disease Mother Ritu Long     Hypertension Mother Ritu Long     Hypertension Father Ovidio SILVEIRA Rojas Bain     Diabetes Maternal Grandfather Tomi Hawley     Lung cancer Brother \"Chip\" Xie     Hypertension Brother \"Chip\" Xie     BRCA2 Positive Cousin      BRCA1 Positive Cousin      Breast cancer Cousin          maternal   [4]   Social History  Tobacco Use    Smoking status: Never     Passive exposure: Never    Smokeless tobacco: Never   Vaping Use    Vaping status: Never Used   Substance Use Topics    Alcohol use: Never    Drug use: Yes     Types: Marijuana   [5]   Current Outpatient Medications:     allopurinol (ZYLOPRIM) 300 mg tablet, take 1 tablet by mouth once daily, Disp: 100 tablet, Rfl: 1    aspirin 81 mg chewable tablet, Chew 81 mg in the morning, Disp: , Rfl:     atenolol (TENORMIN) 50 mg tablet, take 1 tablet by mouth every morning, Disp: 90 tablet, Rfl: 1    atorvastatin (LIPITOR) 10 mg " tablet, Take 1 tablet (10 mg total) by mouth every morning, Disp: 100 tablet, Rfl: 1    Cetirizine-Pseudoephedrine (ZYRTEC-D ALLERGY & CONGESTION PO), as needed, Disp: , Rfl:     cholecalciferol (VITAMIN D3) 25 mcg (1,000 units) tablet, 2,000 Units in the morning., Disp: , Rfl:     DULoxetine (CYMBALTA) 60 mg delayed release capsule, take 1 capsule by mouth once daily, Disp: 30 capsule, Rfl: 5    fluticasone (FLONASE) 50 mcg/act nasal spray, 2 sprays into each nostril daily, Disp: 51 mL, Rfl: 3    Insulin Pen Needle (Droplet Pen Needles) 32G X 4 MM MISC, USE 3 TIMES A DAY AS DIRECTED, Disp: 100 each, Rfl: 5    Lancets (OneTouch Delica Plus [id number]) MISC, use 1 LANCET to TEST BLOOD SUGAR once daily, Disp: 100 each, Rfl: 1    liraglutide (VICTOZA) injection, inject 0.6 milligram subcutaneously daily, Disp: 6 mL, Rfl: 5    Omega-3 Fatty Acids (fish oil) 1,000 mg, , Disp: , Rfl:     OneTouch Ultra test strip, TEST 3 TIMES A DAY AS DIRECTED, Disp: 100 strip, Rfl: 1    tiZANidine (ZANAFLEX) 4 mg tablet, Take 1 tablet (4 mg total) by mouth daily at bedtime as needed for muscle spasms, Disp: 90 tablet, Rfl: 0  [6]   Allergies  Allergen Reactions    Nsaids GI Intolerance     Chronic Kidney disease-right total nephrectomy    Pollen Extract Sneezing

## 2025-07-10 ENCOUNTER — OFFICE VISIT (OUTPATIENT)
Dept: PHYSICAL THERAPY | Facility: CLINIC | Age: 70
End: 2025-07-10
Payer: MEDICARE

## 2025-07-10 DIAGNOSIS — G89.29 CHRONIC NECK PAIN: Primary | ICD-10-CM

## 2025-07-10 DIAGNOSIS — M54.2 CHRONIC NECK PAIN: Primary | ICD-10-CM

## 2025-07-10 DIAGNOSIS — M50.30 DDD (DEGENERATIVE DISC DISEASE), CERVICAL: ICD-10-CM

## 2025-07-10 PROCEDURE — 97110 THERAPEUTIC EXERCISES: CPT

## 2025-07-10 PROCEDURE — 97112 NEUROMUSCULAR REEDUCATION: CPT

## 2025-07-10 PROCEDURE — 97140 MANUAL THERAPY 1/> REGIONS: CPT

## 2025-07-10 NOTE — PROGRESS NOTES
"Daily Note     Today's date: 7/10/2025  Patient name: Sam Peacock  : 1955  MRN: [id number]  Referring provider: Ceci Osborn CRNP  Dx:   Encounter Diagnosis     ICD-10-CM    1. Chronic neck pain  M54.2     G89.29       2. DDD (degenerative disc disease), cervical  M50.30                      Subjective: Pt reports that she got a facial yesterday and the position that she was in really hurt her neck, when she got up it was so cramped. By the time she got into her car it was feeling better and today it was good. She has been feeling good in terms of her neck other than this.       Objective: See treatment diary below      Assessment: Tolerated treatment well. Pt tolerated all exercises well today. Added progressions in band color to continue building strength. Pt denied any changes to symptoms. Continue to progress as able. Patient would benefit from continued PT      Plan: Progress treatment as tolerated.         Manuals 7-3-25 7-10-25 6-20-25 6-26-25 7-1-25   STM to upper trap and lower c-spine and upper t-spine psp 10' STM with cream to UT's and MT's 8' c/s psp, upper and middle traps  12' w/cream  8' with cream 6' STM with cream to upper and middle traps and minimal to upper c/s psp SMT                           Neuro Re-Ed         Levator stretch  10\"x12 yrn levator with overpressure 10\"x12 yrn levator with overpressure 10\"x12 yrn levator with overpressure  10\"x12 yrn levator with overpressure  10\"x12 yrn levator with overpressure    T-spine extension over roll  5\"x10 at 2 locations 5\"x10 at 2 locations 5\"x10 at 2 locations  5\"x10 at 2 locations  5\"x10 at 2 locations    Foam roll up wall  X15 lean in at top X15 lean in at top X15 lean in at top X15 lean in at top X15 lean in at top   Scap squeeze  5\" x20 5\"x20 5\"x20 5\"x20 5\"x20   Doorway arms low  10\"x12 arms low 10\"x12 arms low 10\"x12 arms low and elbows extended  10\"x12 10\"x12 arms low    Shoulder rolls  Retro 20x Rolls x20  Bwd x20  Bwd x20  " Bwd x20            Ther Ex        UBE 10' alt Lv1 10' alt Lv1 8' alt  8' alt  10' alt Lv1   MTP/LTP Blue 3x10 ea Blk 3x10 MTP    Blue 3x10 LTP Blue 3x10 each  Blue 3x10 each  Blue 3x10 each    Banded pull apart Green 3x10 Green 3x10 Green 2x10  Green 2x10  Green 3x10    Lat pull down  25# 3x10 25# 3x10   25# 3x10    Rows on machine         Push ups off handrail  Push up off plinth 2x10 Push up off plinth 2x10 Push up off plinth 2x8 Push up off plinth 2x8 Push up off plinth 2x10   Webslide T  Green 3x10       Pt edu        Ther Activity                        Gait Training                        Modalities        MHP   Deferred

## 2025-07-14 ENCOUNTER — APPOINTMENT (OUTPATIENT)
Dept: PHYSICAL THERAPY | Facility: CLINIC | Age: 70
End: 2025-07-14
Payer: MEDICARE

## 2025-07-14 DIAGNOSIS — F32.A DEPRESSION, UNSPECIFIED DEPRESSION TYPE: ICD-10-CM

## 2025-07-14 RX ORDER — DULOXETIN HYDROCHLORIDE 60 MG/1
60 CAPSULE, DELAYED RELEASE ORAL DAILY
Qty: 90 CAPSULE | Refills: 1 | Status: SHIPPED | OUTPATIENT
Start: 2025-07-14

## 2025-07-16 ENCOUNTER — APPOINTMENT (OUTPATIENT)
Dept: PHYSICAL THERAPY | Facility: CLINIC | Age: 70
End: 2025-07-16
Payer: MEDICARE

## 2025-07-16 DIAGNOSIS — M16.12 OSTEOARTHRITIS OF LEFT HIP, UNSPECIFIED OSTEOARTHRITIS TYPE: Primary | ICD-10-CM

## 2025-07-22 ENCOUNTER — APPOINTMENT (OUTPATIENT)
Dept: PHYSICAL THERAPY | Facility: CLINIC | Age: 70
End: 2025-07-22
Payer: MEDICARE

## 2025-07-24 ENCOUNTER — APPOINTMENT (OUTPATIENT)
Dept: PHYSICAL THERAPY | Facility: CLINIC | Age: 70
End: 2025-07-24
Payer: MEDICARE

## 2025-08-04 ENCOUNTER — OFFICE VISIT (OUTPATIENT)
Dept: FAMILY MEDICINE CLINIC | Facility: CLINIC | Age: 70
End: 2025-08-04
Payer: MEDICARE

## 2025-08-04 VITALS
DIASTOLIC BLOOD PRESSURE: 88 MMHG | WEIGHT: 136 LBS | HEART RATE: 65 BPM | BODY MASS INDEX: 25.03 KG/M2 | HEIGHT: 62 IN | OXYGEN SATURATION: 97 % | SYSTOLIC BLOOD PRESSURE: 142 MMHG

## 2025-08-04 DIAGNOSIS — R07.89 LEFT-SIDED CHEST WALL PAIN: Primary | ICD-10-CM

## 2025-08-04 PROCEDURE — 99213 OFFICE O/P EST LOW 20 MIN: CPT | Performed by: FAMILY MEDICINE

## 2025-08-04 PROCEDURE — G2211 COMPLEX E/M VISIT ADD ON: HCPCS | Performed by: FAMILY MEDICINE

## 2025-08-06 ENCOUNTER — TELEPHONE (OUTPATIENT)
Age: 70
End: 2025-08-06

## 2025-08-08 ENCOUNTER — PATIENT MESSAGE (OUTPATIENT)
Dept: RADIOLOGY | Facility: CLINIC | Age: 70
End: 2025-08-08

## 2025-08-08 ENCOUNTER — HOSPITAL ENCOUNTER (OUTPATIENT)
Dept: RADIOLOGY | Facility: HOSPITAL | Age: 70
End: 2025-08-08
Payer: MEDICARE

## 2025-08-19 PROBLEM — M65.4 DE QUERVAIN'S TENOSYNOVITIS, RIGHT: Status: ACTIVE | Noted: 2025-08-19

## 2025-08-19 PROCEDURE — NC001 PR NO CHARGE: Performed by: ORTHOPAEDIC SURGERY

## 2025-08-20 ENCOUNTER — ANESTHESIA EVENT (OUTPATIENT)
Dept: PERIOP | Facility: HOSPITAL | Age: 70
End: 2025-08-20
Payer: MEDICARE

## 2025-08-20 ENCOUNTER — ANESTHESIA (OUTPATIENT)
Dept: PERIOP | Facility: HOSPITAL | Age: 70
End: 2025-08-20
Payer: MEDICARE

## 2025-08-20 ENCOUNTER — HOSPITAL ENCOUNTER (OUTPATIENT)
Facility: HOSPITAL | Age: 70
Setting detail: OUTPATIENT SURGERY
Discharge: HOME | End: 2025-08-20
Attending: ORTHOPAEDIC SURGERY | Admitting: ORTHOPAEDIC SURGERY
Payer: MEDICARE

## 2025-08-20 VITALS
BODY MASS INDEX: 25.03 KG/M2 | TEMPERATURE: 97.9 F | WEIGHT: 136 LBS | DIASTOLIC BLOOD PRESSURE: 65 MMHG | RESPIRATION RATE: 18 BRPM | SYSTOLIC BLOOD PRESSURE: 143 MMHG | OXYGEN SATURATION: 98 % | HEIGHT: 62 IN | HEART RATE: 61 BPM

## 2025-08-20 DIAGNOSIS — M65.4 DE QUERVAIN'S DISEASE (TENOSYNOVITIS): ICD-10-CM

## 2025-08-20 LAB — GLUCOSE SERPL-MCNC: 120 MG/DL (ref 65–140)

## 2025-08-20 PROCEDURE — 88305 TISSUE EXAM BY PATHOLOGIST: CPT | Performed by: PATHOLOGY

## 2025-08-20 PROCEDURE — 82948 REAGENT STRIP/BLOOD GLUCOSE: CPT

## 2025-08-20 PROCEDURE — 25000 INCISION OF TENDON SHEATH: CPT | Performed by: PHYSICIAN ASSISTANT

## 2025-08-20 PROCEDURE — 25000 INCISION OF TENDON SHEATH: CPT | Performed by: ORTHOPAEDIC SURGERY

## 2025-08-20 RX ORDER — CEFAZOLIN SODIUM 2 G/50ML
2000 SOLUTION INTRAVENOUS ONCE
Status: COMPLETED | OUTPATIENT
Start: 2025-08-20 | End: 2025-08-20

## 2025-08-20 RX ORDER — ONDANSETRON 2 MG/ML
INJECTION INTRAMUSCULAR; INTRAVENOUS AS NEEDED
Status: DISCONTINUED | OUTPATIENT
Start: 2025-08-20 | End: 2025-08-20

## 2025-08-20 RX ORDER — MIDAZOLAM HYDROCHLORIDE 2 MG/2ML
INJECTION, SOLUTION INTRAMUSCULAR; INTRAVENOUS AS NEEDED
Status: DISCONTINUED | OUTPATIENT
Start: 2025-08-20 | End: 2025-08-20

## 2025-08-20 RX ORDER — SODIUM CHLORIDE, SODIUM LACTATE, POTASSIUM CHLORIDE, CALCIUM CHLORIDE 600; 310; 30; 20 MG/100ML; MG/100ML; MG/100ML; MG/100ML
20 INJECTION, SOLUTION INTRAVENOUS CONTINUOUS
Status: DISCONTINUED | OUTPATIENT
Start: 2025-08-20 | End: 2025-08-20

## 2025-08-20 RX ORDER — ONDANSETRON 2 MG/ML
4 INJECTION INTRAMUSCULAR; INTRAVENOUS EVERY 6 HOURS PRN
Status: DISCONTINUED | OUTPATIENT
Start: 2025-08-20 | End: 2025-08-20 | Stop reason: HOSPADM

## 2025-08-20 RX ORDER — TRAMADOL HYDROCHLORIDE 50 MG/1
50 TABLET ORAL EVERY 6 HOURS PRN
Status: DISCONTINUED | OUTPATIENT
Start: 2025-08-20 | End: 2025-08-20 | Stop reason: HOSPADM

## 2025-08-20 RX ORDER — FENTANYL CITRATE 50 UG/ML
INJECTION, SOLUTION INTRAMUSCULAR; INTRAVENOUS AS NEEDED
Status: DISCONTINUED | OUTPATIENT
Start: 2025-08-20 | End: 2025-08-20

## 2025-08-20 RX ORDER — SODIUM CHLORIDE, SODIUM LACTATE, POTASSIUM CHLORIDE, CALCIUM CHLORIDE 600; 310; 30; 20 MG/100ML; MG/100ML; MG/100ML; MG/100ML
100 INJECTION, SOLUTION INTRAVENOUS CONTINUOUS
Status: ACTIVE | OUTPATIENT
Start: 2025-08-20 | End: 2025-08-20

## 2025-08-20 RX ORDER — TRAMADOL HYDROCHLORIDE 50 MG/1
50 TABLET ORAL EVERY 6 HOURS SCHEDULED
Status: CANCELLED | OUTPATIENT
Start: 2025-08-20

## 2025-08-20 RX ORDER — TRAMADOL HYDROCHLORIDE 50 MG/1
50 TABLET ORAL EVERY 6 HOURS PRN
Qty: 21 TABLET | Refills: 0 | Status: SHIPPED | OUTPATIENT
Start: 2025-08-20 | End: 2025-08-30

## 2025-08-20 RX ORDER — LIDOCAINE HYDROCHLORIDE 10 MG/ML
INJECTION, SOLUTION EPIDURAL; INFILTRATION; INTRACAUDAL; PERINEURAL AS NEEDED
Status: DISCONTINUED | OUTPATIENT
Start: 2025-08-20 | End: 2025-08-20 | Stop reason: HOSPADM

## 2025-08-20 RX ORDER — CHLORHEXIDINE GLUCONATE 40 MG/ML
SOLUTION TOPICAL DAILY PRN
Status: DISCONTINUED | OUTPATIENT
Start: 2025-08-20 | End: 2025-08-20 | Stop reason: HOSPADM

## 2025-08-20 RX ORDER — CHLORHEXIDINE GLUCONATE ORAL RINSE 1.2 MG/ML
15 SOLUTION DENTAL ONCE
Status: COMPLETED | OUTPATIENT
Start: 2025-08-20 | End: 2025-08-20

## 2025-08-20 RX ORDER — FENTANYL CITRATE/PF 50 MCG/ML
25 SYRINGE (ML) INJECTION
Status: DISCONTINUED | OUTPATIENT
Start: 2025-08-20 | End: 2025-08-20 | Stop reason: HOSPADM

## 2025-08-20 RX ORDER — ONDANSETRON 2 MG/ML
4 INJECTION INTRAMUSCULAR; INTRAVENOUS ONCE AS NEEDED
Status: DISCONTINUED | OUTPATIENT
Start: 2025-08-20 | End: 2025-08-20 | Stop reason: HOSPADM

## 2025-08-20 RX ORDER — PROPOFOL 10 MG/ML
INJECTION, EMULSION INTRAVENOUS CONTINUOUS PRN
Status: DISCONTINUED | OUTPATIENT
Start: 2025-08-20 | End: 2025-08-20

## 2025-08-20 RX ADMIN — FENTANYL CITRATE 25 MCG: 50 INJECTION INTRAMUSCULAR; INTRAVENOUS at 07:27

## 2025-08-20 RX ADMIN — CHLORHEXIDINE GLUCONATE 15 ML: 1.2 SOLUTION ORAL at 07:11

## 2025-08-20 RX ADMIN — SODIUM CHLORIDE, SODIUM LACTATE, POTASSIUM CHLORIDE, AND CALCIUM CHLORIDE 20 ML/HR: .6; .31; .03; .02 INJECTION, SOLUTION INTRAVENOUS at 07:11

## 2025-08-20 RX ADMIN — MIDAZOLAM 2 MG: 1 INJECTION INTRAMUSCULAR; INTRAVENOUS at 07:26

## 2025-08-20 RX ADMIN — ONDANSETRON 4 MG: 2 INJECTION INTRAMUSCULAR; INTRAVENOUS at 07:48

## 2025-08-20 RX ADMIN — FENTANYL CITRATE 25 MCG: 50 INJECTION INTRAMUSCULAR; INTRAVENOUS at 07:47

## 2025-08-20 RX ADMIN — TRAMADOL HYDROCHLORIDE 50 MG: 50 TABLET, COATED ORAL at 09:01

## 2025-08-20 RX ADMIN — PROPOFOL 100 MCG/KG/MIN: 10 INJECTION, EMULSION INTRAVENOUS at 07:26

## 2025-08-20 RX ADMIN — CEFAZOLIN SODIUM 2000 MG: 2 SOLUTION INTRAVENOUS at 07:26

## 2025-08-20 RX ADMIN — FENTANYL CITRATE 25 MCG: 50 INJECTION INTRAMUSCULAR; INTRAVENOUS at 07:41

## 2025-08-20 RX ADMIN — FENTANYL CITRATE 25 MCG: 50 INJECTION INTRAMUSCULAR; INTRAVENOUS at 07:34

## 2025-08-21 ENCOUNTER — TELEPHONE (OUTPATIENT)
Age: 70
End: 2025-08-21

## (undated) DEVICE — Device

## (undated) DEVICE — SPONGE GAUZE 4 X 4 16 PLY STRL PLASTIC TRAY LF

## (undated) DEVICE — PACK PBDS PLASTIC HAND RF